# Patient Record
Sex: FEMALE | Race: BLACK OR AFRICAN AMERICAN | NOT HISPANIC OR LATINO | Employment: FULL TIME | RURAL
[De-identification: names, ages, dates, MRNs, and addresses within clinical notes are randomized per-mention and may not be internally consistent; named-entity substitution may affect disease eponyms.]

---

## 2019-09-30 ENCOUNTER — HISTORICAL (OUTPATIENT)
Dept: ADMINISTRATIVE | Facility: HOSPITAL | Age: 54
End: 2019-09-30

## 2019-10-02 LAB
LAB AP CLINICAL INFORMATION: NORMAL
LAB AP COMMENTS: NORMAL
LAB AP GENERAL CAT - HISTORICAL: NORMAL
LAB AP INTERPRETATION/RESULT - HISTORICAL: NEGATIVE
LAB AP SPECIMEN ADEQUACY - HISTORICAL: NORMAL
LAB AP SPECIMEN SUBMITTED - HISTORICAL: NORMAL

## 2021-09-13 ENCOUNTER — OFFICE VISIT (OUTPATIENT)
Dept: OBSTETRICS AND GYNECOLOGY | Facility: CLINIC | Age: 56
End: 2021-09-13
Payer: COMMERCIAL

## 2021-09-13 VITALS
RESPIRATION RATE: 18 BRPM | HEART RATE: 73 BPM | TEMPERATURE: 98 F | DIASTOLIC BLOOD PRESSURE: 82 MMHG | BODY MASS INDEX: 34.72 KG/M2 | OXYGEN SATURATION: 97 % | HEIGHT: 66 IN | SYSTOLIC BLOOD PRESSURE: 131 MMHG | WEIGHT: 216 LBS

## 2021-09-13 DIAGNOSIS — Z12.72 ENCOUNTER FOR SCREENING FOR MALIGNANT NEOPLASM OF VAGINA: ICD-10-CM

## 2021-09-13 DIAGNOSIS — R19.8 CHANGE IN BOWEL MOVEMENT: ICD-10-CM

## 2021-09-13 DIAGNOSIS — Z12.4 ENCOUNTER FOR SCREENING FOR MALIGNANT NEOPLASM OF CERVIX: ICD-10-CM

## 2021-09-13 DIAGNOSIS — N76.0 ACUTE VAGINITIS: Primary | ICD-10-CM

## 2021-09-13 DIAGNOSIS — Z01.419 ENCOUNTER FOR GYNECOLOGICAL EXAMINATION WITHOUT ABNORMAL FINDING: ICD-10-CM

## 2021-09-13 LAB
CANDIDA SPECIES: NEGATIVE
GARDNERELLA: POSITIVE
TRICHOMONAS: NEGATIVE

## 2021-09-13 PROCEDURE — 3075F SYST BP GE 130 - 139MM HG: CPT | Mod: ,,, | Performed by: ADVANCED PRACTICE MIDWIFE

## 2021-09-13 PROCEDURE — 1159F PR MEDICATION LIST DOCUMENTED IN MEDICAL RECORD: ICD-10-PCS | Mod: ,,, | Performed by: ADVANCED PRACTICE MIDWIFE

## 2021-09-13 PROCEDURE — 87624 HUMAN PAPILLOMAVIRUS (HPV): ICD-10-PCS | Mod: ,,, | Performed by: CLINICAL MEDICAL LABORATORY

## 2021-09-13 PROCEDURE — 87510 BACTERIAL VAGINOSIS: ICD-10-PCS | Mod: ,,, | Performed by: CLINICAL MEDICAL LABORATORY

## 2021-09-13 PROCEDURE — 88142 CYTOPATH C/V THIN LAYER: CPT | Mod: GCY | Performed by: ADVANCED PRACTICE MIDWIFE

## 2021-09-13 PROCEDURE — 3008F PR BODY MASS INDEX (BMI) DOCUMENTED: ICD-10-PCS | Mod: ,,, | Performed by: ADVANCED PRACTICE MIDWIFE

## 2021-09-13 PROCEDURE — 87624 HPV HI-RISK TYP POOLED RSLT: CPT | Mod: ,,, | Performed by: CLINICAL MEDICAL LABORATORY

## 2021-09-13 PROCEDURE — 3008F BODY MASS INDEX DOCD: CPT | Mod: ,,, | Performed by: ADVANCED PRACTICE MIDWIFE

## 2021-09-13 PROCEDURE — 3079F PR MOST RECENT DIASTOLIC BLOOD PRESSURE 80-89 MM HG: ICD-10-PCS | Mod: ,,, | Performed by: ADVANCED PRACTICE MIDWIFE

## 2021-09-13 PROCEDURE — 87660 TRICHOMONAS VAGIN DIR PROBE: CPT | Mod: ,,, | Performed by: CLINICAL MEDICAL LABORATORY

## 2021-09-13 PROCEDURE — 87660 BACTERIAL VAGINOSIS: ICD-10-PCS | Mod: ,,, | Performed by: CLINICAL MEDICAL LABORATORY

## 2021-09-13 PROCEDURE — 3079F DIAST BP 80-89 MM HG: CPT | Mod: ,,, | Performed by: ADVANCED PRACTICE MIDWIFE

## 2021-09-13 PROCEDURE — 87510 GARDNER VAG DNA DIR PROBE: CPT | Mod: ,,, | Performed by: CLINICAL MEDICAL LABORATORY

## 2021-09-13 PROCEDURE — 1159F MED LIST DOCD IN RCRD: CPT | Mod: ,,, | Performed by: ADVANCED PRACTICE MIDWIFE

## 2021-09-13 PROCEDURE — 99396 PR PREVENTIVE VISIT,EST,40-64: ICD-10-PCS | Mod: ,,, | Performed by: ADVANCED PRACTICE MIDWIFE

## 2021-09-13 PROCEDURE — 3075F PR MOST RECENT SYSTOLIC BLOOD PRESS GE 130-139MM HG: ICD-10-PCS | Mod: ,,, | Performed by: ADVANCED PRACTICE MIDWIFE

## 2021-09-13 PROCEDURE — 87480 BACTERIAL VAGINOSIS: ICD-10-PCS | Mod: ,,, | Performed by: CLINICAL MEDICAL LABORATORY

## 2021-09-13 PROCEDURE — 99396 PREV VISIT EST AGE 40-64: CPT | Mod: ,,, | Performed by: ADVANCED PRACTICE MIDWIFE

## 2021-09-13 PROCEDURE — 87480 CANDIDA DNA DIR PROBE: CPT | Mod: ,,, | Performed by: CLINICAL MEDICAL LABORATORY

## 2021-09-13 RX ORDER — CHOLECALCIFEROL (VITAMIN D3) 125 MCG
5000 CAPSULE ORAL DAILY
COMMUNITY

## 2021-09-15 LAB
GH SERPL-MCNC: NORMAL NG/ML
INSULIN SERPL-ACNC: NORMAL U[IU]/ML
LAB AP CLINICAL INFORMATION: NORMAL
LAB AP GYN INTERPRETATION: NEGATIVE
LAB AP PAP DISCLAIMER COMMENTS: NORMAL
RENIN PLAS-CCNC: NORMAL NG/ML/H

## 2021-09-19 RX ORDER — METRONIDAZOLE 500 MG/1
500 TABLET ORAL 2 TIMES DAILY
Qty: 14 TABLET | Refills: 0 | Status: SHIPPED | OUTPATIENT
Start: 2021-09-19 | End: 2021-09-26

## 2021-09-25 LAB
HPV 16: NEGATIVE
HPV 18: NEGATIVE
HPV OTHER: NEGATIVE

## 2021-10-21 ENCOUNTER — OFFICE VISIT (OUTPATIENT)
Dept: PRIMARY CARE CLINIC | Facility: CLINIC | Age: 56
End: 2021-10-21
Payer: COMMERCIAL

## 2021-10-21 VITALS
OXYGEN SATURATION: 100 % | HEIGHT: 66 IN | RESPIRATION RATE: 16 BRPM | SYSTOLIC BLOOD PRESSURE: 160 MMHG | DIASTOLIC BLOOD PRESSURE: 108 MMHG | TEMPERATURE: 99 F | BODY MASS INDEX: 34.39 KG/M2 | WEIGHT: 214 LBS | HEART RATE: 72 BPM

## 2021-10-21 DIAGNOSIS — R03.0 ELEVATED BLOOD PRESSURE READING WITHOUT DIAGNOSIS OF HYPERTENSION: ICD-10-CM

## 2021-10-21 DIAGNOSIS — R23.2 HOT FLASHES: ICD-10-CM

## 2021-10-21 DIAGNOSIS — K21.9 GASTROESOPHAGEAL REFLUX DISEASE WITHOUT ESOPHAGITIS: Primary | ICD-10-CM

## 2021-10-21 PROCEDURE — 1159F MED LIST DOCD IN RCRD: CPT | Mod: ,,, | Performed by: NURSE PRACTITIONER

## 2021-10-21 PROCEDURE — 3008F BODY MASS INDEX DOCD: CPT | Mod: ,,, | Performed by: NURSE PRACTITIONER

## 2021-10-21 PROCEDURE — 3008F PR BODY MASS INDEX (BMI) DOCUMENTED: ICD-10-PCS | Mod: ,,, | Performed by: NURSE PRACTITIONER

## 2021-10-21 PROCEDURE — 99213 PR OFFICE/OUTPT VISIT, EST, LEVL III, 20-29 MIN: ICD-10-PCS | Mod: ,,, | Performed by: NURSE PRACTITIONER

## 2021-10-21 PROCEDURE — 3080F DIAST BP >= 90 MM HG: CPT | Mod: ,,, | Performed by: NURSE PRACTITIONER

## 2021-10-21 PROCEDURE — 1159F PR MEDICATION LIST DOCUMENTED IN MEDICAL RECORD: ICD-10-PCS | Mod: ,,, | Performed by: NURSE PRACTITIONER

## 2021-10-21 PROCEDURE — 99213 OFFICE O/P EST LOW 20 MIN: CPT | Mod: ,,, | Performed by: NURSE PRACTITIONER

## 2021-10-21 PROCEDURE — 3077F PR MOST RECENT SYSTOLIC BLOOD PRESSURE >= 140 MM HG: ICD-10-PCS | Mod: ,,, | Performed by: NURSE PRACTITIONER

## 2021-10-21 PROCEDURE — 3077F SYST BP >= 140 MM HG: CPT | Mod: ,,, | Performed by: NURSE PRACTITIONER

## 2021-10-21 PROCEDURE — 3080F PR MOST RECENT DIASTOLIC BLOOD PRESSURE >= 90 MM HG: ICD-10-PCS | Mod: ,,, | Performed by: NURSE PRACTITIONER

## 2021-10-21 RX ORDER — OMEPRAZOLE 20 MG/1
20 CAPSULE, DELAYED RELEASE ORAL DAILY
Qty: 30 CAPSULE | Refills: 11 | Status: SHIPPED | OUTPATIENT
Start: 2021-10-21 | End: 2023-02-27

## 2021-10-21 RX ORDER — CALC/MAG/B COMPLEX/D3/HERB 61
15 TABLET ORAL DAILY
COMMUNITY
End: 2021-10-21

## 2021-11-01 PROBLEM — R03.0 ELEVATED BLOOD PRESSURE READING WITHOUT DIAGNOSIS OF HYPERTENSION: Status: ACTIVE | Noted: 2021-11-01

## 2021-11-01 PROBLEM — R23.2 HOT FLASHES: Status: ACTIVE | Noted: 2021-11-01

## 2021-11-01 PROBLEM — K21.9 GASTROESOPHAGEAL REFLUX DISEASE WITHOUT ESOPHAGITIS: Status: ACTIVE | Noted: 2021-11-01

## 2021-11-24 ENCOUNTER — CLINICAL SUPPORT (OUTPATIENT)
Dept: PRIMARY CARE CLINIC | Facility: CLINIC | Age: 56
End: 2021-11-24
Payer: COMMERCIAL

## 2021-11-24 VITALS — DIASTOLIC BLOOD PRESSURE: 110 MMHG | SYSTOLIC BLOOD PRESSURE: 146 MMHG

## 2021-11-24 RX ORDER — HYDROCHLOROTHIAZIDE 12.5 MG/1
12.5 TABLET ORAL DAILY
Qty: 30 TABLET | Refills: 11 | Status: SHIPPED | OUTPATIENT
Start: 2021-11-24 | End: 2021-12-23 | Stop reason: SDUPTHER

## 2021-12-23 ENCOUNTER — CLINICAL SUPPORT (OUTPATIENT)
Dept: PRIMARY CARE CLINIC | Facility: CLINIC | Age: 56
End: 2021-12-23
Payer: COMMERCIAL

## 2021-12-23 VITALS — DIASTOLIC BLOOD PRESSURE: 84 MMHG | SYSTOLIC BLOOD PRESSURE: 138 MMHG

## 2021-12-23 DIAGNOSIS — I10 ESSENTIAL HYPERTENSION: Primary | ICD-10-CM

## 2021-12-23 RX ORDER — HYDROCHLOROTHIAZIDE 12.5 MG/1
12.5 TABLET ORAL DAILY
Qty: 90 TABLET | Refills: 3 | Status: SHIPPED | OUTPATIENT
Start: 2021-12-23 | End: 2022-01-16 | Stop reason: SDUPTHER

## 2022-01-18 RX ORDER — HYDROCHLOROTHIAZIDE 12.5 MG/1
12.5 TABLET ORAL DAILY
Qty: 90 TABLET | Refills: 0 | Status: SHIPPED | OUTPATIENT
Start: 2022-01-18 | End: 2022-02-01 | Stop reason: SDUPTHER

## 2022-02-01 ENCOUNTER — PATIENT MESSAGE (OUTPATIENT)
Dept: PRIMARY CARE CLINIC | Facility: CLINIC | Age: 57
End: 2022-02-01
Payer: COMMERCIAL

## 2022-02-01 ENCOUNTER — OFFICE VISIT (OUTPATIENT)
Dept: PRIMARY CARE CLINIC | Facility: CLINIC | Age: 57
End: 2022-02-01
Payer: COMMERCIAL

## 2022-02-01 VITALS
OXYGEN SATURATION: 96 % | HEART RATE: 75 BPM | RESPIRATION RATE: 16 BRPM | WEIGHT: 214 LBS | HEIGHT: 66 IN | DIASTOLIC BLOOD PRESSURE: 70 MMHG | TEMPERATURE: 98 F | BODY MASS INDEX: 34.39 KG/M2 | SYSTOLIC BLOOD PRESSURE: 124 MMHG

## 2022-02-01 DIAGNOSIS — Z13.220 SCREENING FOR LIPOID DISORDERS: ICD-10-CM

## 2022-02-01 DIAGNOSIS — Z12.11 SCREENING FOR MALIGNANT NEOPLASM OF COLON: ICD-10-CM

## 2022-02-01 DIAGNOSIS — I10 ESSENTIAL HYPERTENSION: ICD-10-CM

## 2022-02-01 DIAGNOSIS — Z00.00 ENCOUNTER FOR GENERAL ADULT MEDICAL EXAMINATION WITHOUT ABNORMAL FINDINGS: Primary | ICD-10-CM

## 2022-02-01 DIAGNOSIS — Z13.1 SCREENING FOR DIABETES MELLITUS: ICD-10-CM

## 2022-02-01 PROCEDURE — 3074F SYST BP LT 130 MM HG: CPT | Mod: ,,, | Performed by: NURSE PRACTITIONER

## 2022-02-01 PROCEDURE — 1159F MED LIST DOCD IN RCRD: CPT | Mod: ,,, | Performed by: NURSE PRACTITIONER

## 2022-02-01 PROCEDURE — 3008F BODY MASS INDEX DOCD: CPT | Mod: ,,, | Performed by: NURSE PRACTITIONER

## 2022-02-01 PROCEDURE — 3078F PR MOST RECENT DIASTOLIC BLOOD PRESSURE < 80 MM HG: ICD-10-PCS | Mod: ,,, | Performed by: NURSE PRACTITIONER

## 2022-02-01 PROCEDURE — 1159F PR MEDICATION LIST DOCUMENTED IN MEDICAL RECORD: ICD-10-PCS | Mod: ,,, | Performed by: NURSE PRACTITIONER

## 2022-02-01 PROCEDURE — 3078F DIAST BP <80 MM HG: CPT | Mod: ,,, | Performed by: NURSE PRACTITIONER

## 2022-02-01 PROCEDURE — 3008F PR BODY MASS INDEX (BMI) DOCUMENTED: ICD-10-PCS | Mod: ,,, | Performed by: NURSE PRACTITIONER

## 2022-02-01 PROCEDURE — 99396 PREV VISIT EST AGE 40-64: CPT | Mod: ,,, | Performed by: NURSE PRACTITIONER

## 2022-02-01 PROCEDURE — 3074F PR MOST RECENT SYSTOLIC BLOOD PRESSURE < 130 MM HG: ICD-10-PCS | Mod: ,,, | Performed by: NURSE PRACTITIONER

## 2022-02-01 PROCEDURE — 99396 PR PREVENTIVE VISIT,EST,40-64: ICD-10-PCS | Mod: ,,, | Performed by: NURSE PRACTITIONER

## 2022-02-01 RX ORDER — HYDROCHLOROTHIAZIDE 12.5 MG/1
12.5 TABLET ORAL DAILY
Qty: 90 TABLET | Refills: 3 | Status: CANCELLED | OUTPATIENT
Start: 2022-02-01

## 2022-02-01 RX ORDER — HYDROCHLOROTHIAZIDE 12.5 MG/1
12.5 TABLET ORAL DAILY
Qty: 90 TABLET | Refills: 3 | Status: SHIPPED | OUTPATIENT
Start: 2022-02-01 | End: 2023-02-06 | Stop reason: SDUPTHER

## 2022-02-03 PROBLEM — Z00.00 ENCOUNTER FOR GENERAL ADULT MEDICAL EXAMINATION WITHOUT ABNORMAL FINDINGS: Status: ACTIVE | Noted: 2022-02-03

## 2022-02-03 NOTE — PROGRESS NOTES
Subjective:       Patient ID: Cary Carbone is a 56 y.o. female.    Chief Complaint: Follow-up (Healthy you)    HPI Cary Carbone presents today for Healthy You. No complaints or problems at present.  Mammogram, pap UTD  Colonoscopy not UTD. Patient agreeable to Cologuard    Review of Systems   Constitutional: Negative for fatigue, fever and unexpected weight change.   HENT: Negative for nasal congestion, postnasal drip and sore throat.    Eyes: Negative for pain and redness.   Respiratory: Negative for cough, shortness of breath and wheezing.    Cardiovascular: Negative for chest pain and leg swelling.   Gastrointestinal: Negative for abdominal pain, constipation, diarrhea and nausea.   Genitourinary: Negative for dysuria and hematuria.   Musculoskeletal: Negative for gait problem.   Integumentary:  Negative for color change and rash.   Neurological: Negative for vertigo, syncope and headaches.         Objective:      Physical Exam  Constitutional:       Appearance: Normal appearance.   HENT:      Head: Normocephalic.   Eyes:      Pupils: Pupils are equal, round, and reactive to light.   Cardiovascular:      Rate and Rhythm: Normal rate and regular rhythm.      Pulses: Normal pulses.      Heart sounds: Normal heart sounds.   Pulmonary:      Effort: Pulmonary effort is normal. No respiratory distress.      Breath sounds: Normal breath sounds. No wheezing, rhonchi or rales.   Abdominal:      General: Bowel sounds are normal.      Palpations: Abdomen is soft.      Tenderness: There is no abdominal tenderness.   Musculoskeletal:         General: Normal range of motion.      Cervical back: Normal range of motion and neck supple. No tenderness.   Skin:     General: Skin is warm and dry.   Neurological:      General: No focal deficit present.      Mental Status: She is alert.      Cranial Nerves: No cranial nerve deficit.      Gait: Gait normal.         Assessment:       Problem List Items Addressed This Visit         Cardiac/Vascular    Essential hypertension    Relevant Medications    hydroCHLOROthiazide (HYDRODIURIL) 12.5 MG Tab       Other    Encounter for general adult medical examination without abnormal findings - Primary     Healthy You labs  Cologuard  Continue all medications  Decrease fried/fatty foods in diet, increase protein and dietary fiber intake  Moderate exercise 3-5 x weekly           Other Visit Diagnoses     Screening for diabetes mellitus        Relevant Orders    Glucose, Fasting (Completed)    Screening for lipoid disorders        Relevant Orders    Lipid Panel (Completed)    Screening for malignant neoplasm of colon        Relevant Orders    Cologuard Screening (Multitarget Stool DNA)          Plan:       Encounter for general adult medical examination without abnormal findings  Healthy You labs  Cologuard  Continue all medications  Decrease fried/fatty foods in diet, increase protein and dietary fiber intake  Moderate exercise 3-5 x weekly

## 2022-02-03 NOTE — ASSESSMENT & PLAN NOTE
Healthy You labs  Cologuard  Continue all medications  Decrease fried/fatty foods in diet, increase protein and dietary fiber intake  Moderate exercise 3-5 x weekly

## 2022-02-16 LAB — NONINV COLON CA DNA+OCC BLD SCRN STL QL: NEGATIVE

## 2022-02-22 ENCOUNTER — PATIENT MESSAGE (OUTPATIENT)
Dept: PRIMARY CARE CLINIC | Facility: CLINIC | Age: 57
End: 2022-02-22
Payer: COMMERCIAL

## 2022-05-09 PROBLEM — Z00.00 ENCOUNTER FOR GENERAL ADULT MEDICAL EXAMINATION WITHOUT ABNORMAL FINDINGS: Status: RESOLVED | Noted: 2022-02-03 | Resolved: 2022-05-09

## 2022-06-23 ENCOUNTER — PATIENT MESSAGE (OUTPATIENT)
Dept: PRIMARY CARE CLINIC | Facility: CLINIC | Age: 57
End: 2022-06-23
Payer: COMMERCIAL

## 2022-06-23 ENCOUNTER — OFFICE VISIT (OUTPATIENT)
Dept: PRIMARY CARE CLINIC | Facility: CLINIC | Age: 57
End: 2022-06-23
Payer: COMMERCIAL

## 2022-06-23 ENCOUNTER — HOSPITAL ENCOUNTER (OUTPATIENT)
Dept: RADIOLOGY | Facility: HOSPITAL | Age: 57
Discharge: HOME | End: 2022-06-23
Attending: NURSE PRACTITIONER
Payer: COMMERCIAL

## 2022-06-23 VITALS
WEIGHT: 210 LBS | HEIGHT: 66 IN | OXYGEN SATURATION: 95 % | BODY MASS INDEX: 33.75 KG/M2 | SYSTOLIC BLOOD PRESSURE: 126 MMHG | DIASTOLIC BLOOD PRESSURE: 80 MMHG | HEART RATE: 78 BPM | RESPIRATION RATE: 18 BRPM | TEMPERATURE: 98 F

## 2022-06-23 DIAGNOSIS — I10 ESSENTIAL HYPERTENSION: Primary | ICD-10-CM

## 2022-06-23 DIAGNOSIS — M25.561 PAIN AND SWELLING OF RIGHT KNEE: ICD-10-CM

## 2022-06-23 DIAGNOSIS — M25.461 PAIN AND SWELLING OF RIGHT KNEE: ICD-10-CM

## 2022-06-23 DIAGNOSIS — E55.9 VITAMIN D DEFICIENCY: ICD-10-CM

## 2022-06-23 PROCEDURE — 73560 XR KNEE 1 OR 2 VIEW RIGHT: ICD-10-PCS | Mod: 26,RT,, | Performed by: RADIOLOGY

## 2022-06-23 PROCEDURE — 99213 PR OFFICE/OUTPT VISIT, EST, LEVL III, 20-29 MIN: ICD-10-PCS | Mod: ,,, | Performed by: NURSE PRACTITIONER

## 2022-06-23 PROCEDURE — 3008F BODY MASS INDEX DOCD: CPT | Mod: ,,, | Performed by: NURSE PRACTITIONER

## 2022-06-23 PROCEDURE — 3079F DIAST BP 80-89 MM HG: CPT | Mod: ,,, | Performed by: NURSE PRACTITIONER

## 2022-06-23 PROCEDURE — 73560 X-RAY EXAM OF KNEE 1 OR 2: CPT | Mod: TC,RT

## 2022-06-23 PROCEDURE — 1159F MED LIST DOCD IN RCRD: CPT | Mod: ,,, | Performed by: NURSE PRACTITIONER

## 2022-06-23 PROCEDURE — 3008F PR BODY MASS INDEX (BMI) DOCUMENTED: ICD-10-PCS | Mod: ,,, | Performed by: NURSE PRACTITIONER

## 2022-06-23 PROCEDURE — 3074F SYST BP LT 130 MM HG: CPT | Mod: ,,, | Performed by: NURSE PRACTITIONER

## 2022-06-23 PROCEDURE — 3079F PR MOST RECENT DIASTOLIC BLOOD PRESSURE 80-89 MM HG: ICD-10-PCS | Mod: ,,, | Performed by: NURSE PRACTITIONER

## 2022-06-23 PROCEDURE — 3074F PR MOST RECENT SYSTOLIC BLOOD PRESSURE < 130 MM HG: ICD-10-PCS | Mod: ,,, | Performed by: NURSE PRACTITIONER

## 2022-06-23 PROCEDURE — 1159F PR MEDICATION LIST DOCUMENTED IN MEDICAL RECORD: ICD-10-PCS | Mod: ,,, | Performed by: NURSE PRACTITIONER

## 2022-06-23 PROCEDURE — 73560 X-RAY EXAM OF KNEE 1 OR 2: CPT | Mod: 26,RT,, | Performed by: RADIOLOGY

## 2022-06-23 PROCEDURE — 99213 OFFICE O/P EST LOW 20 MIN: CPT | Mod: ,,, | Performed by: NURSE PRACTITIONER

## 2022-06-23 RX ORDER — LATANOPROST 50 UG/ML
SOLUTION/ DROPS OPHTHALMIC
COMMUNITY
Start: 2022-06-15

## 2022-06-23 NOTE — PROGRESS NOTES
Subjective:       Patient ID: Cary Carbone is a 56 y.o. female.    Chief Complaint: 6 month check up and Intermittent right knee pain (X2 weeks. Taking Tylenol and using ice pack/)    HPI Cary Carbone presents today for routine follow up.  Patient reports right knee pain and swelling x 2 weeks. Denies injury. Reports traveling to [place] for vacation where she did a lot of walking and had long car rides to and from destination. Denies instability of knee. Has applied ice and taken Tylenol with some relief of pain.    Review of Systems   Constitutional: Negative for activity change, fatigue, fever and unexpected weight change.   HENT: Negative for nasal congestion, hearing loss, postnasal drip, rhinorrhea, sore throat and trouble swallowing.    Eyes: Negative for pain, discharge, redness and visual disturbance.   Respiratory: Negative for cough, chest tightness, shortness of breath and wheezing.    Cardiovascular: Negative for chest pain, palpitations and leg swelling.   Gastrointestinal: Negative for abdominal pain, blood in stool, constipation, diarrhea, nausea and vomiting.   Endocrine: Negative for polydipsia and polyuria.   Genitourinary: Negative for difficulty urinating, dysuria, hematuria and menstrual problem.   Musculoskeletal: Positive for arthralgias. Negative for gait problem, joint swelling and neck pain.   Integumentary:  Negative for color change and rash.   Neurological: Negative for vertigo, syncope, weakness and headaches.   Psychiatric/Behavioral: Negative for confusion and dysphoric mood.         Objective:      Physical Exam  Constitutional:       Appearance: Normal appearance.   HENT:      Head: Normocephalic.   Eyes:      Pupils: Pupils are equal, round, and reactive to light.   Cardiovascular:      Rate and Rhythm: Normal rate and regular rhythm.      Pulses: Normal pulses.      Heart sounds: Normal heart sounds.   Pulmonary:      Effort: Pulmonary effort is normal. No respiratory distress.       Breath sounds: Normal breath sounds. No wheezing, rhonchi or rales.   Abdominal:      General: Bowel sounds are normal.      Palpations: Abdomen is soft.      Tenderness: There is no abdominal tenderness.   Musculoskeletal:      Cervical back: Normal range of motion and neck supple. No tenderness.      Right knee: Swelling present. Decreased range of motion. Tenderness present.   Skin:     General: Skin is warm and dry.   Neurological:      General: No focal deficit present.      Mental Status: She is alert.      Cranial Nerves: No cranial nerve deficit.      Gait: Gait normal.         Assessment:       Problem List Items Addressed This Visit        Cardiac/Vascular    Essential hypertension - Primary    Relevant Orders    CBC Auto Differential (Completed)    Comprehensive Metabolic Panel (Completed)      Other Visit Diagnoses     Pain and swelling of right knee        Relevant Orders    X-Ray Knee 1 or 2 View Right (Completed)    Vitamin D deficiency        Relevant Orders    Vitamin D          Plan:       Labs as ordered  X-ray right knee

## 2023-02-06 DIAGNOSIS — I10 ESSENTIAL HYPERTENSION: ICD-10-CM

## 2023-02-06 RX ORDER — HYDROCHLOROTHIAZIDE 12.5 MG/1
12.5 TABLET ORAL DAILY
Qty: 90 TABLET | Refills: 0 | Status: SHIPPED | OUTPATIENT
Start: 2023-02-06 | End: 2023-02-27 | Stop reason: SDUPTHER

## 2023-02-27 ENCOUNTER — OFFICE VISIT (OUTPATIENT)
Dept: PRIMARY CARE CLINIC | Facility: CLINIC | Age: 58
End: 2023-02-27
Payer: COMMERCIAL

## 2023-02-27 ENCOUNTER — HOSPITAL ENCOUNTER (OUTPATIENT)
Dept: RADIOLOGY | Facility: HOSPITAL | Age: 58
Discharge: HOME | End: 2023-02-27
Attending: NURSE PRACTITIONER
Payer: COMMERCIAL

## 2023-02-27 VITALS
HEIGHT: 66 IN | TEMPERATURE: 98 F | OXYGEN SATURATION: 98 % | DIASTOLIC BLOOD PRESSURE: 84 MMHG | SYSTOLIC BLOOD PRESSURE: 132 MMHG | WEIGHT: 212 LBS | HEART RATE: 70 BPM | BODY MASS INDEX: 34.07 KG/M2

## 2023-02-27 VITALS — WEIGHT: 212 LBS | BODY MASS INDEX: 34.22 KG/M2

## 2023-02-27 DIAGNOSIS — Z11.4 SCREENING FOR HIV (HUMAN IMMUNODEFICIENCY VIRUS): ICD-10-CM

## 2023-02-27 DIAGNOSIS — Z12.39 ENCOUNTER FOR SCREENING FOR MALIGNANT NEOPLASM OF BREAST, UNSPECIFIED SCREENING MODALITY: ICD-10-CM

## 2023-02-27 DIAGNOSIS — I10 ESSENTIAL HYPERTENSION: ICD-10-CM

## 2023-02-27 DIAGNOSIS — Z00.00 ROUTINE GENERAL MEDICAL EXAMINATION AT A HEALTH CARE FACILITY: Primary | ICD-10-CM

## 2023-02-27 DIAGNOSIS — Z11.59 NEED FOR HEPATITIS C SCREENING TEST: ICD-10-CM

## 2023-02-27 DIAGNOSIS — Z13.220 SCREENING FOR LIPOID DISORDERS: ICD-10-CM

## 2023-02-27 DIAGNOSIS — Z13.1 SCREENING FOR DIABETES MELLITUS: ICD-10-CM

## 2023-02-27 PROBLEM — G47.33 OBSTRUCTIVE SLEEP APNEA SYNDROME: Status: ACTIVE | Noted: 2023-02-27

## 2023-02-27 PROBLEM — G47.30 SLEEP APNEA: Status: ACTIVE | Noted: 2023-02-27

## 2023-02-27 PROCEDURE — 99396 PREV VISIT EST AGE 40-64: CPT | Mod: ,,, | Performed by: NURSE PRACTITIONER

## 2023-02-27 PROCEDURE — 3008F BODY MASS INDEX DOCD: CPT | Mod: ,,, | Performed by: NURSE PRACTITIONER

## 2023-02-27 PROCEDURE — 77067 SCR MAMMO BI INCL CAD: CPT | Mod: TC

## 2023-02-27 PROCEDURE — 3008F PR BODY MASS INDEX (BMI) DOCUMENTED: ICD-10-PCS | Mod: ,,, | Performed by: NURSE PRACTITIONER

## 2023-02-27 PROCEDURE — 1159F PR MEDICATION LIST DOCUMENTED IN MEDICAL RECORD: ICD-10-PCS | Mod: ,,, | Performed by: NURSE PRACTITIONER

## 2023-02-27 PROCEDURE — 3075F PR MOST RECENT SYSTOLIC BLOOD PRESS GE 130-139MM HG: ICD-10-PCS | Mod: ,,, | Performed by: NURSE PRACTITIONER

## 2023-02-27 PROCEDURE — 1160F PR REVIEW ALL MEDS BY PRESCRIBER/CLIN PHARMACIST DOCUMENTED: ICD-10-PCS | Mod: ,,, | Performed by: NURSE PRACTITIONER

## 2023-02-27 PROCEDURE — 3075F SYST BP GE 130 - 139MM HG: CPT | Mod: ,,, | Performed by: NURSE PRACTITIONER

## 2023-02-27 PROCEDURE — 1159F MED LIST DOCD IN RCRD: CPT | Mod: ,,, | Performed by: NURSE PRACTITIONER

## 2023-02-27 PROCEDURE — 99396 PR PREVENTIVE VISIT,EST,40-64: ICD-10-PCS | Mod: ,,, | Performed by: NURSE PRACTITIONER

## 2023-02-27 PROCEDURE — 1160F RVW MEDS BY RX/DR IN RCRD: CPT | Mod: ,,, | Performed by: NURSE PRACTITIONER

## 2023-02-27 PROCEDURE — 3079F DIAST BP 80-89 MM HG: CPT | Mod: ,,, | Performed by: NURSE PRACTITIONER

## 2023-02-27 PROCEDURE — 3079F PR MOST RECENT DIASTOLIC BLOOD PRESSURE 80-89 MM HG: ICD-10-PCS | Mod: ,,, | Performed by: NURSE PRACTITIONER

## 2023-02-27 RX ORDER — HYDROCHLOROTHIAZIDE 12.5 MG/1
12.5 TABLET ORAL DAILY
Qty: 90 TABLET | Refills: 3 | Status: SHIPPED | OUTPATIENT
Start: 2023-02-27 | End: 2024-02-09

## 2023-02-27 NOTE — PATIENT INSTRUCTIONS
"Patient Education       Diet and Health   The Basics   Written by the doctors and editors at [facility]   Why is it important to eat a healthy diet? -- It's important to eat a healthy diet because eating the right foods can keep you healthy now and later on in life.  Which foods are especially healthy? -- Foods that are especially healthy include:  Fruits and vegetables - Eating a diet with lots of fruits and vegetables can help prevent heart disease and strokes. It might also help prevent certain types of cancers. Try to eat fruits and vegetables at each meal and also for snacks. If you don't have fresh fruits and vegetables available, you can eat frozen or canned ones instead. Doctors recommend eating at least 2 1/2 servings of vegetables and 2 servings of fruits each day.  Foods with fiber - Eating foods with a lot of fiber can help prevent heart disease and strokes. If you have type 2 diabetes, it can also help control your blood sugar. Foods that have a lot of fiber include vegetables, fruits, beans, nuts, oatmeal, and whole grain breads and cereals. You can tell how much fiber is in a food by reading the nutrition label (figure 1). Doctors recommend eating 25 to 36 grams of fiber each day.  Foods with folate (also called folic acid) - Folate is a vitamin that is important for pregnant people, since it helps prevent certain birth defects. Anyone who could get pregnant should get at least 400 micrograms of folic acid daily, whether or not they are actively trying to get pregnant. Folate is found in many breakfast cereals, oranges, orange juice, and green leafy vegetables.  Foods with calcium and vitamin D - Babies, children, and adults need calcium and vitamin D to help keep their bones strong. Adults also need calcium and vitamin D to help prevent osteoporosis. Osteoporosis is a condition that causes bones to get "thin" and break more easily than usual. Different foods and drinks have calcium and vitamin D in " "them (figure 2). People who don't get enough calcium and vitamin D in their diet might need to take a supplement.  Foods with protein - Protein helps your muscles stay strong. Healthy foods with a lot of protein include chicken, fish, eggs, beans, nuts, and soy products. Red meat also has a lot of protein, but it also contains fats, which can be unhealthy.  Some experts recommend a "Mediterranean diet." This involves eating a lot of fruits, vegetables, nuts, whole grains, and olive oil. It also includes some fish, poultry, and dairy products, but not a lot of red meat. Eating this way can help your overall health, and might even lower your risk of having a stroke.  What foods should I avoid or limit? -- To eat a healthy diet, there are some things you should avoid or limit. They include:   Fats - There are different types of fats. Some types of fats are better for your body than others.  Trans fats are especially unhealthy. They are found in margarines, many fast foods, and some store-bought baked goods. Trans fats can raise your cholesterol level and your increase your chance of getting heart disease. You should avoid eating foods with these types of fats.  The type of "polyunsaturated" fats found in fish seems to be healthy and can reduce your chance of getting heart disease. Other polyunsaturated fats might also be good for your health. When you cook, it's best to use oils with healthier fats, such as olive oil and canola oil.  Sugar - To have a healthy diet, it's important to limit or avoid sugar, sweets, and refined grains. Refined grains are found in white bread, white rice, most forms of pasta, and most packaged "snack" foods. Whole grains, such as whole-wheat bread and brown rice, have more fiber and are better for your health.  Avoiding sugar-sweetened beverages, like soda and sports drinks, can also help improve your health.  Red meat - Studies have shown that eating a lot of red meat can increase your " "risk of certain health problems, including heart disease and cancer. You should limit the amount of red meat that you eat.  Can I drink alcohol as part of a healthy diet? -- People who drink a small amount of alcohol each day might have a lower chance of getting heart disease. But drinking alcohol can lead to problems. For example, it can raise a person's chances of getting liver disease and certain types of cancers. In women, even 1 drink a day can increase the risk of getting breast cancer.  Most doctors recommend that adult women not have more than 1 drink a day and that adult men not have more than 2 drinks a day. The limits are different because most women's bodies take longer to break down alcohol.  How many calories do I need each day? -- The number of calories you need each day depends on your weight, height, age, sex, and how active you are.  Your doctor or nurse can tell you how many calories you should eat each day. If you are trying to lose weight, you should eat fewer calories each day.  What if I have questions? -- If you have questions about which foods you should or should not eat, ask your doctor or nurse. The right diet for you will depend, in part, on your health and any medical conditions you have.  All topics are updated as new evidence becomes available and our peer review process is complete.  This topic retrieved from [facility] [employer] [facility] on: Sep 21, 2021.  Topic 84273 Version 20.0  Release: 29.4.2 - C29.263  © 2021 UpToDate, Inc. and/or its affiliates. All rights reserved.  figure 1: Nutrition label - fiber     This is an example of a nutrition label. To figure out how much fiber is in a food, look for the line that says "Dietary Fiber." It's also important to look at the serving size. This food has 7 grams of fiber in each serving, and each serving is 1 cup.  Graphic 53182 Version 7.0    figure 2: Foods and drinks with calcium and vitamin D     Foods rich in calcium include ice cream, soy milk, breads, " "kale, broccoli, milk, cheese, cottage cheese, almonds, yogurt, ready-to-eat cereals, beans, and tofu. Foods rich in vitamin D include milk, fortified plant-based "milks" (soy, almond), canned tuna fish, cod liver oil, yogurt, ready-to-eat-cereals, cooked salmon, canned sardines, mackerel, and eggs. Some of these foods are rich in both.  Graphic 96872 Version 4.0    Consumer Information Use and Disclaimer   This information is not specific medical advice and does not replace information you receive from your health care provider. This is only a brief summary of general information. It does NOT include all information about conditions, illnesses, injuries, tests, procedures, treatments, therapies, discharge instructions or life-style choices that may apply to you. You must talk with your health care provider for complete information about your health and treatment options. This information should not be used to decide whether or not to accept your health care provider's advice, instructions or recommendations. Only your health care provider has the knowledge and training to provide advice that is right for you. The use of this information is governed by the [employer] End User License Agreement, available at https://www.MODLOFT.Wis.dm/en/solutions/Wingz/about/amaury.The use of [employer] content is governed by the [employer] Terms of Use. ©2021 UpToDate, Inc. All rights reserved.  Copyright   © 2021 UpToDate, Inc. and/or its affiliates. All rights reserved.    "

## 2023-02-27 NOTE — PROGRESS NOTES
Subjective:       Patient ID: Cary Carbone is a 57 y.o. female.    Chief Complaint: Healthy You    Pt presents for a healthy you visit and to establish care.     Review of Systems   Constitutional:  Negative for activity change and unexpected weight change.   HENT:  Negative for hearing loss, rhinorrhea and trouble swallowing.    Eyes:  Negative for discharge and visual disturbance.   Respiratory:  Negative for chest tightness and wheezing.    Cardiovascular:  Negative for chest pain and palpitations.   Gastrointestinal:  Negative for blood in stool, constipation, diarrhea and vomiting.   Endocrine: Negative for polydipsia and polyuria.   Genitourinary:  Negative for difficulty urinating, dysuria, hematuria and menstrual problem.   Musculoskeletal:  Negative for arthralgias, joint swelling and neck pain.   Neurological:  Negative for weakness and headaches.   Psychiatric/Behavioral:  Negative for confusion and dysphoric mood.        Objective:      Physical Exam  Vitals and nursing note reviewed.   Constitutional:       Appearance: Normal appearance.   Cardiovascular:      Rate and Rhythm: Normal rate and regular rhythm.      Heart sounds: Normal heart sounds.   Pulmonary:      Effort: Pulmonary effort is normal.      Breath sounds: Normal breath sounds.   Musculoskeletal:         General: Normal range of motion.   Neurological:      Mental Status: She is alert and oriented to person, place, and time.   Psychiatric:         Mood and Affect: Mood normal.         Behavior: Behavior normal.       Assessment:       Problem List Items Addressed This Visit          Cardiac/Vascular    Essential hypertension    Relevant Medications    hydroCHLOROthiazide (HYDRODIURIL) 12.5 MG Tab    Other Relevant Orders    CBC Auto Differential    Comprehensive Metabolic Panel    TSH     Other Visit Diagnoses       Routine general medical examination at a health care facility    -  Primary    Screening for diabetes mellitus         Relevant Orders    Hemoglobin A1C    Screening for lipoid disorders        Relevant Orders    Lipid Panel    BMI 34.0-34.9,adult        Need for hepatitis C screening test        Relevant Orders    Hepatitis C Antibody    Screening for HIV (human immunodeficiency virus)        Relevant Orders    HIV 1/2 Ag/Ab (4th Gen)    Encounter for screening for malignant neoplasm of breast, unspecified screening modality        Relevant Orders    Mammo Digital Screening Bilat              Plan:       Will call pt with lab results. She will go for a walk in mammogram today.

## 2023-02-27 NOTE — LETTER
February 27, 2023      [facility]  [address] [facility] [address]  Phone: [phone]  Fax: [phone]       Patient: Cary Carbone   YOB: 1965  Date of Visit: 02/27/2023    To Whom It May Concern:    Dona Carbone  was at [facility] on 02/27/2023. The patient may return to work/school on 02/28/2023 with no restrictions. If you have any questions or concerns, or if I can be of further assistance, please do not hesitate to contact me.    Sincerely,    AMANDA Kim

## 2023-03-06 ENCOUNTER — OFFICE VISIT (OUTPATIENT)
Dept: OBSTETRICS AND GYNECOLOGY | Facility: CLINIC | Age: 58
End: 2023-03-06
Payer: COMMERCIAL

## 2023-03-06 VITALS
HEIGHT: 66 IN | WEIGHT: 208.19 LBS | HEART RATE: 87 BPM | OXYGEN SATURATION: 98 % | TEMPERATURE: 98 F | SYSTOLIC BLOOD PRESSURE: 148 MMHG | DIASTOLIC BLOOD PRESSURE: 82 MMHG | RESPIRATION RATE: 18 BRPM | BODY MASS INDEX: 33.46 KG/M2

## 2023-03-06 DIAGNOSIS — R23.2 HOT FLASHES: ICD-10-CM

## 2023-03-06 DIAGNOSIS — N76.0 BV (BACTERIAL VAGINOSIS): ICD-10-CM

## 2023-03-06 DIAGNOSIS — Z01.419 WELL WOMAN EXAM WITH ROUTINE GYNECOLOGICAL EXAM: Primary | ICD-10-CM

## 2023-03-06 DIAGNOSIS — L70.0 OPEN COMEDONE: ICD-10-CM

## 2023-03-06 DIAGNOSIS — Z12.4 ENCOUNTER FOR SCREENING FOR MALIGNANT NEOPLASM OF CERVIX: ICD-10-CM

## 2023-03-06 DIAGNOSIS — N95.1 MENOPAUSAL SYMPTOM: ICD-10-CM

## 2023-03-06 DIAGNOSIS — B96.89 BV (BACTERIAL VAGINOSIS): ICD-10-CM

## 2023-03-06 LAB
CANDIDA SPECIES: NEGATIVE
GARDNERELLA: POSITIVE
TRICHOMONAS: NEGATIVE

## 2023-03-06 PROCEDURE — 87660 TRICHOMONAS VAGIN DIR PROBE: CPT | Mod: ,,, | Performed by: CLINICAL MEDICAL LABORATORY

## 2023-03-06 PROCEDURE — 87660 BACTERIAL VAGINOSIS: ICD-10-PCS | Mod: ,,, | Performed by: CLINICAL MEDICAL LABORATORY

## 2023-03-06 PROCEDURE — 99396 PR PREVENTIVE VISIT,EST,40-64: ICD-10-PCS | Mod: ,,, | Performed by: ADVANCED PRACTICE MIDWIFE

## 2023-03-06 PROCEDURE — 87480 BACTERIAL VAGINOSIS: ICD-10-PCS | Mod: ,,, | Performed by: CLINICAL MEDICAL LABORATORY

## 2023-03-06 PROCEDURE — 87510 GARDNER VAG DNA DIR PROBE: CPT | Mod: ,,, | Performed by: CLINICAL MEDICAL LABORATORY

## 2023-03-06 PROCEDURE — 87510 BACTERIAL VAGINOSIS: ICD-10-PCS | Mod: ,,, | Performed by: CLINICAL MEDICAL LABORATORY

## 2023-03-06 PROCEDURE — 87624 HUMAN PAPILLOMAVIRUS (HPV): ICD-10-PCS | Mod: ,,, | Performed by: CLINICAL MEDICAL LABORATORY

## 2023-03-06 PROCEDURE — 88142 CYTOPATH C/V THIN LAYER: CPT | Mod: TC,GCY | Performed by: ADVANCED PRACTICE MIDWIFE

## 2023-03-06 PROCEDURE — 99396 PREV VISIT EST AGE 40-64: CPT | Mod: ,,, | Performed by: ADVANCED PRACTICE MIDWIFE

## 2023-03-06 PROCEDURE — 87624 HPV HI-RISK TYP POOLED RSLT: CPT | Mod: ,,, | Performed by: CLINICAL MEDICAL LABORATORY

## 2023-03-06 PROCEDURE — 87480 CANDIDA DNA DIR PROBE: CPT | Mod: ,,, | Performed by: CLINICAL MEDICAL LABORATORY

## 2023-03-06 RX ORDER — METRONIDAZOLE 500 MG/1
500 TABLET ORAL 2 TIMES DAILY
Qty: 14 TABLET | Refills: 3 | Status: SHIPPED | OUTPATIENT
Start: 2023-03-06 | End: 2023-08-28

## 2023-03-06 RX ORDER — PAROXETINE 10 MG/1
10 TABLET, FILM COATED ORAL EVERY MORNING
Qty: 30 TABLET | Refills: 11 | Status: SHIPPED | OUTPATIENT
Start: 2023-03-06 | End: 2024-03-05

## 2023-03-06 NOTE — PROGRESS NOTES
CC: Here for pap smear, annual exam    Cary Carbone is a 57 y.o. female  presents for well woman exam.  LMP: No LMP recorded (lmp unknown). Patient is postmenopausal.. Menses are: absent and pt is menopausal. States is exercising with  daily. She states she has been having some issues with her knee and continues to exercise- states is being managed by AMANDA Gaines.      C/O hot flashes daily. States takes is taking menopause pills/gummies in a purple bottle off TV- informed to not take medication and could possibly increase blood pressure. Denies any further issues, problems, or complaints.    Last mammogram: 2023  Colonoscopy: 2022 was a [facility] performed and recommended to be repeated in 3 years per CHUY Trammell    Past Medical History:   Diagnosis Date    Essential hypertension 2021     Past Surgical History:   Procedure Laterality Date     SECTION      COLONOSCOPY      over 10 years ago per patient @ [facility] 2021    [facility]  2022    Results were negative per AMANDA Duran- recommended repeat in 3 yrs    TUBAL LIGATION       Social History     Socioeconomic History    Marital status:    Tobacco Use    Smoking status: Never    Smokeless tobacco: Never   Substance and Sexual Activity    Alcohol use: Never    Drug use: Never    Sexual activity: Yes     Partners: Male     Social Determinants of Health     Physical Activity: Insufficiently Active    Days of Exercise per Week: 1 day    Minutes of Exercise per Session: 20 min   Stress: No Stress Concern Present    Feeling of Stress : Not at all     Family History   Problem Relation Age of Onset    Diabetes Mother     Breast cancer Maternal Aunt      OB History          2    Para   2    Term   2            AB        Living   1         SAB        IAB        Ectopic        Multiple        Live Births   1                 BP (!) 148/82   Pulse 87   Temp 98.4 °F (36.9 °C) (Oral)   Resp 18   " Ht 5' 6" (1.676 m)   Wt 94.4 kg (208 lb 3.2 oz)   LMP  (LMP Unknown)   SpO2 98%   BMI 33.60 kg/m²       ROS:  GENERAL: Denies weight gain or weight loss. Feeling well overall.   SKIN: Denies rash or lesions.   HEAD: Denies head injury or headache.   NODES: Denies enlarged lymph nodes.   CHEST: Denies chest pain or shortness of breath.   CARDIOVASCULAR: Denies palpitations or left sided chest pain.   ABDOMEN: No abdominal pain, constipation, diarrhea, nausea, vomiting or rectal bleeding.   URINARY: No frequency, dysuria, hematuria, or burning on urination.  REPRODUCTIVE: See HPI.   BREASTS: The patient performs breast self-examination and denies pain, lumps, or nipple discharge.   HEMATOLOGIC: No easy bruisability or excessive bleeding.   MUSCULOSKELETAL: Denies joint pain or swelling.   NEUROLOGIC: Denies syncope or weakness.   PSYCHIATRIC: Denies depression, anxiety or mood swings.    PHYSICAL EXAM:  APPEARANCE: Well nourished, well developed, in no acute distress.  AFFECT: WNL, alert and oriented x 3  SKIN: No acne or hirsutism  NECK: Neck symmetric without masses or thyromegaly  NODES: No inguinal, cervical, axillary, or femoral lymph node enlargement  CHEST: Good respiratory effect  ABDOMEN: Soft.  No tenderness or masses.  No hepatosplenomegaly.  No hernias.  BREASTS: Symmetrical, no skin changes or visible lesions.  No palpable masses, nipple discharge bilaterally. 1x1 cm firm comodone noted to right upper breast area without any tendernessredness, drainage, or edema  PELVIC: Normal external genitalia without lesions.  Normal hair distribution.  Adequate perineal body, normal urethral meatus.  Vagina moist and well rugated without lesions, with thick white discharge.  Cervix pink, without lesions, tenderness, stenotic with noted thick white discharge.  No significant cystocele or rectocele.  Bimanual exam shows uterus to be normal size, regular, mobile and nontender.  Adnexa without masses or tenderness. "    EXTREMITIES: No edema.    Well woman exam with routine gynecological exam  -     ThinPrep Pap Test; Future; Expected date: 03/06/2023    Encounter for screening for malignant neoplasm of cervix  -     ThinPrep Pap Test; Future; Expected date: 03/06/2023    Menopausal symptom  -     paroxetine (PAXIL) 10 MG tablet; Take 1 tablet (10 mg total) by mouth every morning.  Dispense: 30 tablet; Refill: 11    Hot flashes  -     paroxetine (PAXIL) 10 MG tablet; Take 1 tablet (10 mg total) by mouth every morning.  Dispense: 30 tablet; Refill: 11    BMI 33.0-33.9,adult    BV (bacterial vaginosis)  -     Bacterial Vaginosis; Future; Expected date: 03/06/2023    Open comedone  -     Ambulatory referral/consult to Dermatology; Future; Expected date: 03/13/2023        ICD-10-CM ICD-9-CM    1. Well woman exam with routine gynecological exam  Z01.419 V72.31 ThinPrep Pap Test      ThinPrep Pap Test      2. Encounter for screening for malignant neoplasm of cervix  Z12.4 V76.2 ThinPrep Pap Test      ThinPrep Pap Test      3. Menopausal symptom  N95.1 627.2 paroxetine (PAXIL) 10 MG tablet      4. Hot flashes  R23.2 782.62 paroxetine (PAXIL) 10 MG tablet      5. BMI 33.0-33.9,adult  Z68.33 V85.33       6. BV (bacterial vaginosis)  N76.0 616.10 Bacterial Vaginosis    B96.89 041.9 Bacterial Vaginosis      7. Open comedone  L70.0 706.1 Ambulatory referral/consult to Dermatology          Patient was counseled today on A.C.S. Pap guidelines and recommendations for yearly pelvic exams, mammograms and monthly self breast exams; to see her PCP for other health maintenance.   Exercise regimen encouraged  Healthy food choices encouraged  Multivitamins daily  Vitamin D daily  Weight loss encouraged  Low weight bearing exericses  Calcium and vitamin D to promote bone health  Avoid highly caffeinated sodas and energy drinks  Questions answered to desired level of satisfaction  Verbalized understanding to all information and instructions    Follow  up in about 1 year (around 3/6/2024), or if symptoms worsen or fail to improve, for Annual Exam in 1 year and 1 month to f/u hot flashes/menopausal syndrome.

## 2023-03-11 LAB
HPV 16: NEGATIVE
HPV 18: NEGATIVE
HPV OTHER: NEGATIVE

## 2023-03-21 ENCOUNTER — OFFICE VISIT (OUTPATIENT)
Dept: DERMATOLOGY | Facility: CLINIC | Age: 58
End: 2023-03-21
Payer: COMMERCIAL

## 2023-03-21 DIAGNOSIS — L72.0 EPIDERMAL CYST: ICD-10-CM

## 2023-03-21 PROCEDURE — 1159F MED LIST DOCD IN RCRD: CPT | Mod: ,,, | Performed by: STUDENT IN AN ORGANIZED HEALTH CARE EDUCATION/TRAINING PROGRAM

## 2023-03-21 PROCEDURE — 3044F PR MOST RECENT HEMOGLOBIN A1C LEVEL <7.0%: ICD-10-PCS | Mod: ,,, | Performed by: STUDENT IN AN ORGANIZED HEALTH CARE EDUCATION/TRAINING PROGRAM

## 2023-03-21 PROCEDURE — 3044F HG A1C LEVEL LT 7.0%: CPT | Mod: ,,, | Performed by: STUDENT IN AN ORGANIZED HEALTH CARE EDUCATION/TRAINING PROGRAM

## 2023-03-21 PROCEDURE — 99203 OFFICE O/P NEW LOW 30 MIN: CPT | Mod: ,,, | Performed by: STUDENT IN AN ORGANIZED HEALTH CARE EDUCATION/TRAINING PROGRAM

## 2023-03-21 PROCEDURE — 1159F PR MEDICATION LIST DOCUMENTED IN MEDICAL RECORD: ICD-10-PCS | Mod: ,,, | Performed by: STUDENT IN AN ORGANIZED HEALTH CARE EDUCATION/TRAINING PROGRAM

## 2023-03-21 PROCEDURE — 99203 PR OFFICE/OUTPT VISIT, NEW, LEVL III, 30-44 MIN: ICD-10-PCS | Mod: ,,, | Performed by: STUDENT IN AN ORGANIZED HEALTH CARE EDUCATION/TRAINING PROGRAM

## 2023-03-21 NOTE — PATIENT INSTRUCTIONS
Epidermal inclusion cyst on the right breast that is benign. Please call when you want to schedule an excision. Excision slots are scheduled Thursday mornings

## 2023-03-21 NOTE — PROGRESS NOTES
[facility]  Eliud Hilton MD    [address] [place] [address]  [phone]    Fax: [phone]    Patient Name: Cary Carbone  Medical Record Number: [id number]  PCP: AMANDA Kim  Age: 57 y.o. : 1965  Contact: [phone] (home) [phone] (work)    CC: skin lesion  History of Present Illness:     Cary Carbone is a 57 y.o.  female with no history of skin cancer  who presents to clinic today for skin lesion on the right breast.  This has been present for over three months. Symptoms include growth and drainage. Previous treatments include none.      The patient has no other concerns today.    Review of Systems:     Unremarkable other than mentioned above.     Physical Exam:     General: Relaxed, oriented, alert    Skin examination of the scalp, face, neck, chest, back, abdomen, upper extremities and lower extremities were normal except for as listed below        Assessment and Plan:     1. Epidermal Cyst  - subcutaneous cyst with prominent follicular pore located on the right breast     Plan:  - scheduled for excision. Discussed risks and benefits             Return to clinic as needed.     AVS printed with patient instructions     Eliud Hilton MD   Mohs Surgery/Dermatologic Oncology  Dermatology

## 2023-03-23 ENCOUNTER — PATIENT OUTREACH (OUTPATIENT)
Dept: ADMINISTRATIVE | Facility: HOSPITAL | Age: 58
End: 2023-03-23

## 2023-03-23 NOTE — PROGRESS NOTES
Post visit Population Health review of encounter with date of service 02/27/2023 with GIOVANNI Bauer.  HY biometrics present in BC website.  No HY scheduled.  [place] [facility] [place]

## 2023-04-06 ENCOUNTER — PROCEDURE VISIT (OUTPATIENT)
Dept: DERMATOLOGY | Facility: CLINIC | Age: 58
End: 2023-04-06
Payer: COMMERCIAL

## 2023-04-06 VITALS — SYSTOLIC BLOOD PRESSURE: 145 MMHG | DIASTOLIC BLOOD PRESSURE: 89 MMHG | HEART RATE: 68 BPM

## 2023-04-06 DIAGNOSIS — D48.9 NEOPLASM OF UNCERTAIN BEHAVIOR: Primary | ICD-10-CM

## 2023-04-06 PROCEDURE — 13101 PR RECMPL WND TRUNK 2.6-7.5 CM: ICD-10-PCS | Mod: 51,,, | Performed by: STUDENT IN AN ORGANIZED HEALTH CARE EDUCATION/TRAINING PROGRAM

## 2023-04-06 PROCEDURE — 88304 TISSUE EXAM BY PATHOLOGIST: CPT | Mod: 26,,, | Performed by: PATHOLOGY

## 2023-04-06 PROCEDURE — 99499 NO LOS: ICD-10-PCS | Mod: ,,, | Performed by: STUDENT IN AN ORGANIZED HEALTH CARE EDUCATION/TRAINING PROGRAM

## 2023-04-06 PROCEDURE — 11403 EXC TR-EXT B9+MARG 2.1-3CM: CPT | Mod: ,,, | Performed by: STUDENT IN AN ORGANIZED HEALTH CARE EDUCATION/TRAINING PROGRAM

## 2023-04-06 PROCEDURE — 13101 CMPLX RPR TRUNK 2.6-7.5 CM: CPT | Mod: 51,,, | Performed by: STUDENT IN AN ORGANIZED HEALTH CARE EDUCATION/TRAINING PROGRAM

## 2023-04-06 PROCEDURE — 88304 TISSUE EXAM BY PATHOLOGIST: CPT | Mod: TC,SUR | Performed by: STUDENT IN AN ORGANIZED HEALTH CARE EDUCATION/TRAINING PROGRAM

## 2023-04-06 PROCEDURE — 11403 PR EXC SKIN BENIG 2.1-3 CM TRUNK,ARM,LEG: ICD-10-PCS | Mod: ,,, | Performed by: STUDENT IN AN ORGANIZED HEALTH CARE EDUCATION/TRAINING PROGRAM

## 2023-04-06 PROCEDURE — 88304 PATHOLOGY, DERMATOLOGY: ICD-10-PCS | Mod: 26,,, | Performed by: PATHOLOGY

## 2023-04-06 PROCEDURE — 99499 UNLISTED E&M SERVICE: CPT | Mod: ,,, | Performed by: STUDENT IN AN ORGANIZED HEALTH CARE EDUCATION/TRAINING PROGRAM

## 2023-04-06 NOTE — PATIENT INSTRUCTIONS

## 2023-04-06 NOTE — PROGRESS NOTES
Excision Consult Note    Cary Carbone is a 57 y.o. female who is referred by Dr. Hilton for evaluation of a neoplasm of uncertain behavior on the right breast. It has grown and is tender.         Preoperative Risk Factors:  Current Anticoagulants: No  Endocarditis / Rheumatic Fever hx: No  Immunocompromised: No  Prosthetic joint: No  Congenital heart defect: No  Prosthetic heart valve: No  Diabetic: No  Transplant: No  Pacemaker: No  Defibrillator:  No  Prior problem with local anesthesia: No  Tobacco History: No]  Clindamycin Allergy: No  Pregnant: no     Transmissible Diseases:  HIV No  Hepatitis B or C  No      Exam:  Limited skin exam is normal except for a cyst  located on the R breast  .      Assessment and Plan:  Treatment Options : Given the indications and high cure rate, the patient has agreed to proceed with excision  Risks and Benefits : The rationale for excision was explained to the patient. The risks and benefits to therapy were discussed in detail. Specifically, the risks of infection, scarring, bleeding, dehiscence, hematoma, prolonged wound healing, incomplete removal, allergy to anesthesia, nerve injury, inability to clear the lesion and recurrence were addressed. The treatment site was clearly identified and confirmed by the patient.    Plan:  Excision    Eliud Hilton MD   Mohs Surgery/Dermatologic Oncology

## 2023-04-06 NOTE — PROGRESS NOTES
[facility]    Eliud Hilton MD    Elliptical Excision with Linear Closure    Tumor Type: NUB, suspect EIC   Location:  R breast  Derm-Path Accession #:  n/[id number]   Lesion Size:  2.6 x 2.1 cm   Surgical Margins: 0   Post op size: 2.6 x 2.1 cm   Level of Defect:  Fat  Repair Type: Complex Linear   Repair Length:  3.3 cm   Sutures: 4-0 PDS; 5-0 Prolene    Primary Surgeon: EDWARD Hilton MD      INDICATIONS:  The risks of bleeding, infection, discomfort, incomplete removal, and scar formation were explained to the patient.  All questions were answered.  After informed consent, confirmation of site and identity, and appropriate instructions, the patient underwent the procedure as follows:    PROCEDURE:  With the patient in a supine position, the lesion was outlined with the above margins. An ellipse was designed around the lesion to conform to relaxed skin tension lines in an effort to minimize scarring and deformity.  The patient was then placed in a supine position.  The lesion and surrounding skin were prepped with chlorhexidine, draped, and anesthetized with 1% lidocaine with epinephrine 1:100,100 buffered with 1:10 sodium bicarbonate.  Using a #15 blade, the skin was excised along premarked lines.  The resulting defect extended through deep subcutaneous tissue.  Wound margins were undermined to limit functional deformity/impairment of adjacent structures.  Bleeding vessels were controlled with  monopolar  electrodessication .  A deep placating retention suture was placed to offload tension to the deep margin. The dermis and subcutaneous tissue were closed with buried vertical mattress sutures.  Epidermal approximation was meticulously refined with simple running sutures, resulting in a linear closure with little to no wound tension.  Blood loss was estimated to be less than 5cc.  The area was coated with petrolatum and covered with a non-adherent dressing followed by gauze and tape.  Postoperative instructions  were reviewed per protocol.  The patient left alert and fully oriented.        Eliud Hilton MD

## 2023-04-11 ENCOUNTER — OFFICE VISIT (OUTPATIENT)
Dept: OBSTETRICS AND GYNECOLOGY | Facility: CLINIC | Age: 58
End: 2023-04-11
Payer: COMMERCIAL

## 2023-04-11 VITALS
BODY MASS INDEX: 33.78 KG/M2 | WEIGHT: 210.19 LBS | HEIGHT: 66 IN | OXYGEN SATURATION: 98 % | SYSTOLIC BLOOD PRESSURE: 140 MMHG | HEART RATE: 92 BPM | TEMPERATURE: 98 F | RESPIRATION RATE: 18 BRPM | DIASTOLIC BLOOD PRESSURE: 82 MMHG

## 2023-04-11 DIAGNOSIS — Z78.0 MENOPAUSE: Primary | ICD-10-CM

## 2023-04-11 DIAGNOSIS — Z79.899 FOLLOW-UP ENCOUNTER INVOLVING MEDICATION: ICD-10-CM

## 2023-04-11 LAB
ESTROGEN SERPL-MCNC: NORMAL PG/ML
INSULIN SERPL-ACNC: NORMAL U[IU]/ML
LAB AP GROSS DESCRIPTION: NORMAL
LAB AP LABORATORY NOTES: NORMAL
LAB AP SPEC A DDX: NORMAL
LAB AP SPEC A MORPHOLOGY: NORMAL
LAB AP SPEC A PROCEDURE: NORMAL
T3RU NFR SERPL: NORMAL %

## 2023-04-11 PROCEDURE — 99213 OFFICE O/P EST LOW 20 MIN: CPT | Mod: ,,, | Performed by: ADVANCED PRACTICE MIDWIFE

## 2023-04-11 PROCEDURE — 3077F SYST BP >= 140 MM HG: CPT | Mod: ,,, | Performed by: ADVANCED PRACTICE MIDWIFE

## 2023-04-11 PROCEDURE — 99213 PR OFFICE/OUTPT VISIT, EST, LEVL III, 20-29 MIN: ICD-10-PCS | Mod: ,,, | Performed by: ADVANCED PRACTICE MIDWIFE

## 2023-04-11 PROCEDURE — 3077F PR MOST RECENT SYSTOLIC BLOOD PRESSURE >= 140 MM HG: ICD-10-PCS | Mod: ,,, | Performed by: ADVANCED PRACTICE MIDWIFE

## 2023-04-11 PROCEDURE — 3044F HG A1C LEVEL LT 7.0%: CPT | Mod: ,,, | Performed by: ADVANCED PRACTICE MIDWIFE

## 2023-04-11 PROCEDURE — 1159F PR MEDICATION LIST DOCUMENTED IN MEDICAL RECORD: ICD-10-PCS | Mod: ,,, | Performed by: ADVANCED PRACTICE MIDWIFE

## 2023-04-11 PROCEDURE — 1159F MED LIST DOCD IN RCRD: CPT | Mod: ,,, | Performed by: ADVANCED PRACTICE MIDWIFE

## 2023-04-11 PROCEDURE — 3079F PR MOST RECENT DIASTOLIC BLOOD PRESSURE 80-89 MM HG: ICD-10-PCS | Mod: ,,, | Performed by: ADVANCED PRACTICE MIDWIFE

## 2023-04-11 PROCEDURE — 3008F PR BODY MASS INDEX (BMI) DOCUMENTED: ICD-10-PCS | Mod: ,,, | Performed by: ADVANCED PRACTICE MIDWIFE

## 2023-04-11 PROCEDURE — 3044F PR MOST RECENT HEMOGLOBIN A1C LEVEL <7.0%: ICD-10-PCS | Mod: ,,, | Performed by: ADVANCED PRACTICE MIDWIFE

## 2023-04-11 PROCEDURE — 3079F DIAST BP 80-89 MM HG: CPT | Mod: ,,, | Performed by: ADVANCED PRACTICE MIDWIFE

## 2023-04-11 PROCEDURE — 3008F BODY MASS INDEX DOCD: CPT | Mod: ,,, | Performed by: ADVANCED PRACTICE MIDWIFE

## 2023-04-11 NOTE — PROGRESS NOTES
Subjective:      Patient ID: Cary Carbone is a 57 y.o. female.    Chief Complaint:  Follow-up (medication)      History of Present Illness  Here for f/u hot flashes- states improved with Paxil desires to continue with present medication.    GYN & OB History  No LMP recorded (lmp unknown). Patient is postmenopausal.   Date of Last Pap: No result found    OB History    Para Term  AB Living   2 2 2     1   SAB IAB Ectopic Multiple Live Births           1      # Outcome Date GA Lbr Pepe/2nd Weight Sex Delivery Anes PTL Lv   2 Term 03/10/86    M CS-Unspec None  EMELIA   1 Term 83    M CS-Unspec None N        Review of Systems  Review of Systems   All other systems reviewed and are negative.       Objective:     Physical Exam:   Constitutional: She is oriented to person, place, and time. She appears well-developed and well-nourished.                           Neurological: She is alert and oriented to person, place, and time.    Skin: Skin is warm and dry.   Right upper left breast incision healing well with sutures noted- no redness, edema, or drainage noted.    Psychiatric: She has a normal mood and affect. Her behavior is normal. Judgment and thought content normal.       Assessment:     1. Menopause    2. Follow-up encounter involving medication               Plan:     F/u with dermatologist at scheduled   Continue paxil as prescribed  F/u as needed  Verbalized understanding to all instructions and information  Questions answered to desired level of satisfaction

## 2023-04-17 ENCOUNTER — CLINICAL SUPPORT (OUTPATIENT)
Dept: DERMATOLOGY | Facility: CLINIC | Age: 58
End: 2023-04-17
Payer: COMMERCIAL

## 2023-04-17 DIAGNOSIS — L08.9 SKIN INFECTION: ICD-10-CM

## 2023-04-17 DIAGNOSIS — Z48.02 ENCOUNTER FOR REMOVAL OF SUTURES: Primary | ICD-10-CM

## 2023-04-17 PROCEDURE — 87070 CULTURE, WOUND: ICD-10-PCS | Mod: ,,, | Performed by: CLINICAL MEDICAL LABORATORY

## 2023-04-17 PROCEDURE — 99024 PR POST-OP FOLLOW-UP VISIT: ICD-10-PCS | Mod: ,,, | Performed by: STUDENT IN AN ORGANIZED HEALTH CARE EDUCATION/TRAINING PROGRAM

## 2023-04-17 PROCEDURE — 87070 CULTURE OTHR SPECIMN AEROBIC: CPT | Mod: ,,, | Performed by: CLINICAL MEDICAL LABORATORY

## 2023-04-17 PROCEDURE — 99024 POSTOP FOLLOW-UP VISIT: CPT | Mod: ,,, | Performed by: STUDENT IN AN ORGANIZED HEALTH CARE EDUCATION/TRAINING PROGRAM

## 2023-04-17 NOTE — PROGRESS NOTES
Elliptical Excision with Linear Closure     Tumor Type: NUB, suspect EIC   Location:  R breast  Derm-Path Accession #:  n/[id number]   Lesion Size:  2.6 x 2.1 cm   Surgical Margins: 0   Post op size: 2.6 x 2.1 cm   Level of Defect:  Fat  Repair Type: Complex Linear   Repair Length:  3.3 cm   Sutures: 4-0 PDS; 5-0 Prolene    Sutures removed. Patient tolerated well. Patient c/o tenderness at incision site. Culture obtained. Patient to return to clinic in 6 weeks.     Danyelle Peña RN

## 2023-04-19 LAB — MICROORGANISM SPEC CULT: NORMAL

## 2023-06-01 ENCOUNTER — CLINICAL SUPPORT (OUTPATIENT)
Dept: DERMATOLOGY | Facility: CLINIC | Age: 58
End: 2023-06-01
Payer: COMMERCIAL

## 2023-06-01 DIAGNOSIS — L91.0 HYPERTROPHIC SCAR: ICD-10-CM

## 2023-06-01 DIAGNOSIS — Z48.89 ENCOUNTER FOR POST SURGICAL WOUND CHECK: Primary | ICD-10-CM

## 2023-06-01 PROCEDURE — 11900 INJECT SKIN LESIONS </W 7: CPT | Mod: ,,, | Performed by: STUDENT IN AN ORGANIZED HEALTH CARE EDUCATION/TRAINING PROGRAM

## 2023-06-01 PROCEDURE — 11900 PR INJECTION INTO SKIN LESIONS, UP TO 7: ICD-10-PCS | Mod: ,,, | Performed by: STUDENT IN AN ORGANIZED HEALTH CARE EDUCATION/TRAINING PROGRAM

## 2023-06-01 NOTE — PROGRESS NOTES
Tumor Type: NUB, suspect EIC   Location:  R breast  Derm-Path Accession #:  n/[id number]   Lesion Size:  2.6 x 2.1 cm   Surgical Margins: 0   Post op size: 2.6 x 2.1 cm   Level of Defect:  Fat  Repair Type: Complex Linear   Repair Length:  3.3 cm   Sutures: 4-0 PDS; 5-0 Prolene    Plan: Intralesional Kenalog    Treatment Number: 1  Lesions Injected: 1  Medication Injected: 10 mg/cc of Kenalog  Total Volume Injected: 0.5 cc  Lot Number: [id number]  Expiration Date: 08/2024  NDC #: 6293-4775-69  Administered by: Eliud Hilton MD     The risks of atrophy were reviewed with the patient.  RTC PRN  Estelle Beck CMA

## 2023-08-28 ENCOUNTER — OFFICE VISIT (OUTPATIENT)
Dept: PRIMARY CARE CLINIC | Facility: CLINIC | Age: 58
End: 2023-08-28
Payer: COMMERCIAL

## 2023-08-28 ENCOUNTER — HOSPITAL ENCOUNTER (OUTPATIENT)
Dept: RADIOLOGY | Facility: HOSPITAL | Age: 58
Discharge: HOME | End: 2023-08-28
Attending: NURSE PRACTITIONER
Payer: COMMERCIAL

## 2023-08-28 VITALS
HEART RATE: 67 BPM | RESPIRATION RATE: 17 BRPM | DIASTOLIC BLOOD PRESSURE: 86 MMHG | SYSTOLIC BLOOD PRESSURE: 134 MMHG | HEIGHT: 66 IN | TEMPERATURE: 98 F | BODY MASS INDEX: 33.91 KG/M2 | WEIGHT: 211 LBS | OXYGEN SATURATION: 98 %

## 2023-08-28 DIAGNOSIS — M25.561 CHRONIC PAIN OF RIGHT KNEE: ICD-10-CM

## 2023-08-28 DIAGNOSIS — D36.7 CYST, DERMOID, ARM, LEFT: ICD-10-CM

## 2023-08-28 DIAGNOSIS — I10 ESSENTIAL HYPERTENSION: Primary | ICD-10-CM

## 2023-08-28 DIAGNOSIS — G89.29 CHRONIC PAIN OF RIGHT KNEE: ICD-10-CM

## 2023-08-28 PROBLEM — R03.0 ELEVATED BLOOD PRESSURE READING WITHOUT DIAGNOSIS OF HYPERTENSION: Status: RESOLVED | Noted: 2021-11-01 | Resolved: 2023-08-28

## 2023-08-28 PROCEDURE — 99213 PR OFFICE/OUTPT VISIT, EST, LEVL III, 20-29 MIN: ICD-10-PCS | Mod: ,,, | Performed by: NURSE PRACTITIONER

## 2023-08-28 PROCEDURE — 3008F PR BODY MASS INDEX (BMI) DOCUMENTED: ICD-10-PCS | Mod: ,,, | Performed by: NURSE PRACTITIONER

## 2023-08-28 PROCEDURE — 73560 X-RAY EXAM OF KNEE 1 OR 2: CPT | Mod: 26,RT,, | Performed by: RADIOLOGY

## 2023-08-28 PROCEDURE — 1160F RVW MEDS BY RX/DR IN RCRD: CPT | Mod: ,,, | Performed by: NURSE PRACTITIONER

## 2023-08-28 PROCEDURE — 3079F PR MOST RECENT DIASTOLIC BLOOD PRESSURE 80-89 MM HG: ICD-10-PCS | Mod: ,,, | Performed by: NURSE PRACTITIONER

## 2023-08-28 PROCEDURE — 73560 X-RAY EXAM OF KNEE 1 OR 2: CPT | Mod: TC,RT

## 2023-08-28 PROCEDURE — 3008F BODY MASS INDEX DOCD: CPT | Mod: ,,, | Performed by: NURSE PRACTITIONER

## 2023-08-28 PROCEDURE — 73560 XR KNEE 1 OR 2 VIEW RIGHT: ICD-10-PCS | Mod: 26,RT,, | Performed by: RADIOLOGY

## 2023-08-28 PROCEDURE — 3075F SYST BP GE 130 - 139MM HG: CPT | Mod: ,,, | Performed by: NURSE PRACTITIONER

## 2023-08-28 PROCEDURE — 3044F PR MOST RECENT HEMOGLOBIN A1C LEVEL <7.0%: ICD-10-PCS | Mod: ,,, | Performed by: NURSE PRACTITIONER

## 2023-08-28 PROCEDURE — 3075F PR MOST RECENT SYSTOLIC BLOOD PRESS GE 130-139MM HG: ICD-10-PCS | Mod: ,,, | Performed by: NURSE PRACTITIONER

## 2023-08-28 PROCEDURE — 1159F MED LIST DOCD IN RCRD: CPT | Mod: ,,, | Performed by: NURSE PRACTITIONER

## 2023-08-28 PROCEDURE — 1160F PR REVIEW ALL MEDS BY PRESCRIBER/CLIN PHARMACIST DOCUMENTED: ICD-10-PCS | Mod: ,,, | Performed by: NURSE PRACTITIONER

## 2023-08-28 PROCEDURE — 3044F HG A1C LEVEL LT 7.0%: CPT | Mod: ,,, | Performed by: NURSE PRACTITIONER

## 2023-08-28 PROCEDURE — 99213 OFFICE O/P EST LOW 20 MIN: CPT | Mod: ,,, | Performed by: NURSE PRACTITIONER

## 2023-08-28 PROCEDURE — 1159F PR MEDICATION LIST DOCUMENTED IN MEDICAL RECORD: ICD-10-PCS | Mod: ,,, | Performed by: NURSE PRACTITIONER

## 2023-08-28 PROCEDURE — 3079F DIAST BP 80-89 MM HG: CPT | Mod: ,,, | Performed by: NURSE PRACTITIONER

## 2023-08-28 NOTE — PROGRESS NOTES
Subjective     Patient ID: Cary Carbone is a 57 y.o. female.    Chief Complaint: 6 Month fu for Essential Hypertension (Right knee pain and swelling and has a rash on left elbow.)    Pt presents for a 6 month follow-up for hypertension.       Review of Systems   Constitutional:  Negative for activity change, appetite change, chills, fatigue, fever and unexpected weight change.   HENT:  Negative for nasal congestion, ear discharge, hearing loss, nosebleeds, postnasal drip, rhinorrhea, sinus pressure/congestion, sneezing, sore throat, tinnitus and trouble swallowing.    Eyes:  Negative for pain, discharge, redness, itching and visual disturbance.   Respiratory:  Negative for cough, choking, chest tightness, shortness of breath and wheezing.    Cardiovascular:  Negative for chest pain and palpitations.   Gastrointestinal:  Negative for abdominal distention, abdominal pain, blood in stool, change in bowel habit, constipation, diarrhea, nausea, vomiting and change in bowel habit.   Endocrine: Negative for polydipsia and polyuria.   Genitourinary:  Negative for decreased urine volume, difficulty urinating, dysuria, flank pain, frequency and hematuria.   Musculoskeletal:  Positive for arthralgias and joint swelling. Negative for back pain, gait problem and neck pain.   Integumentary:  Negative for wound, breast mass and breast discharge.   Allergic/Immunologic: Negative for immunocompromised state.   Neurological:  Negative for dizziness, weakness, light-headedness and headaches.   Psychiatric/Behavioral:  Positive for dysphoric mood. Negative for agitation, behavioral problems, confusion and hallucinations.    Breast: Negative for mass         Objective     Physical Exam  Vitals and nursing note reviewed.   Constitutional:       Appearance: Normal appearance.   Cardiovascular:      Rate and Rhythm: Normal rate and regular rhythm.      Heart sounds: Normal heart sounds.   Pulmonary:      Effort: Pulmonary effort is  normal.      Breath sounds: Normal breath sounds.   Musculoskeletal:      Right knee: Swelling and erythema present.   Neurological:      Mental Status: She is alert and oriented to person, place, and time.   Psychiatric:         Mood and Affect: Mood normal.         Behavior: Behavior normal.            Assessment and Plan     1. Essential hypertension  -     CBC Auto Differential; Future; Expected date: 08/28/2023  -     Comprehensive Metabolic Panel; Future; Expected date: 08/28/2023  -     Lipid Panel; Future; Expected date: 08/28/2023  -     TSH; Future; Expected date: 08/28/2023    2. Chronic pain of right knee  -     X-Ray Knee 1 or 2 View Right; Future; Expected date: 08/28/2023  -     Ambulatory referral/consult to Orthopedics; Future; Expected date: 09/04/2023    3. Cyst, dermoid, arm, left    4. BMI 34.0-34.9,adult        Will call pt with lab results. Entered a referral to Dr. Yobani moreland for right knee pain. Xray today. She will make a follow-up with derm Dr. Hilton for cyst to left elbow and cyst to right breast.          Follow up in about 6 months (around 2/28/2024).

## 2023-09-05 ENCOUNTER — OFFICE VISIT (OUTPATIENT)
Dept: ORTHOPEDICS | Facility: CLINIC | Age: 58
End: 2023-09-05
Payer: COMMERCIAL

## 2023-09-05 DIAGNOSIS — M25.561 CHRONIC PAIN OF RIGHT KNEE: Primary | ICD-10-CM

## 2023-09-05 DIAGNOSIS — G89.29 CHRONIC PAIN OF RIGHT KNEE: Primary | ICD-10-CM

## 2023-09-05 PROCEDURE — 99999PBSHW PR PBB SHADOW TECHNICAL ONLY FILED TO HB: Mod: PBBFAC,,,

## 2023-09-05 PROCEDURE — 3044F PR MOST RECENT HEMOGLOBIN A1C LEVEL <7.0%: ICD-10-PCS | Mod: ,,, | Performed by: ORTHOPAEDIC SURGERY

## 2023-09-05 PROCEDURE — 99203 OFFICE O/P NEW LOW 30 MIN: CPT | Mod: S$PBB,25,, | Performed by: ORTHOPAEDIC SURGERY

## 2023-09-05 PROCEDURE — 20610 DRAIN/INJ JOINT/BURSA W/O US: CPT | Mod: PBBFAC | Performed by: ORTHOPAEDIC SURGERY

## 2023-09-05 PROCEDURE — 99203 PR OFFICE/OUTPT VISIT, NEW, LEVL III, 30-44 MIN: ICD-10-PCS | Mod: S$PBB,25,, | Performed by: ORTHOPAEDIC SURGERY

## 2023-09-05 PROCEDURE — 20610 LARGE JOINT ASPIRATION/INJECTION: R KNEE: ICD-10-PCS | Mod: S$PBB,RT,, | Performed by: ORTHOPAEDIC SURGERY

## 2023-09-05 PROCEDURE — 99999PBSHW PR PBB SHADOW TECHNICAL ONLY FILED TO HB: ICD-10-PCS | Mod: PBBFAC,,,

## 2023-09-05 PROCEDURE — 1159F MED LIST DOCD IN RCRD: CPT | Mod: ,,, | Performed by: ORTHOPAEDIC SURGERY

## 2023-09-05 PROCEDURE — 99213 OFFICE O/P EST LOW 20 MIN: CPT | Mod: PBBFAC | Performed by: ORTHOPAEDIC SURGERY

## 2023-09-05 PROCEDURE — 3044F HG A1C LEVEL LT 7.0%: CPT | Mod: ,,, | Performed by: ORTHOPAEDIC SURGERY

## 2023-09-05 PROCEDURE — 1159F PR MEDICATION LIST DOCUMENTED IN MEDICAL RECORD: ICD-10-PCS | Mod: ,,, | Performed by: ORTHOPAEDIC SURGERY

## 2023-09-05 RX ADMIN — BUPIVACAINE HYDROCHLORIDE 3 ML: 5 INJECTION, SOLUTION PERINEURAL at 01:09

## 2023-09-05 RX ADMIN — TRIAMCINOLONE ACETONIDE 40 MG: 400 INJECTION, SUSPENSION INTRA-ARTICULAR; INTRAMUSCULAR at 01:09

## 2023-09-05 NOTE — PROGRESS NOTES
ASSESSMENT:      ICD-10-CM ICD-9-CM   1. Chronic pain of right knee  M25.561 719.46    G89.29 338.29       PLAN:     Findings and treatment options were discussed with the patient regarding the diagnosis.   All questions were answered regarding Cary Carbone 's painful knee.      Natural history and expected course discussed. Questions answered. Educational materials distributed.  Injection given today.  Has some medial based knee OA. Should respond to injection and compression sleeve. If no better in 6 weeks, consider MRI  There are no Patient Instructions on file for this visit.    IMAGING:   X-Ray Knee 1 or 2 View Right    Result Date: 8/28/2023  EXAMINATION: XR KNEE 1 OR 2 VIEW RIGHT CLINICAL HISTORY: Pain in right knee COMPARISON: Right knee x-ray June 23, 2022 TECHNIQUE: Frontal and lateral views of the right knee. FINDINGS: Mild tricompartmental degenerative change of the knee.  Spurring of the superior and inferior poles of patella as well as the tibial tuberosity.  No convincing acute fracture or dislocation demonstrated. No concerning radiopaque foreign body visualized.     As above. Point of Service: [facility] Electronically signed by: Barry Valdez Date:    08/28/2023 Time:    10:07         CC: Knee pain    57 y.o. Female who presents as a new patient to me for evaluation of  right knee pain.    Occupation:   Pain has been present for a couple of months.  Injury description No specific injury. She states that she started having pain and swelling especially at night.    Mechanical symptoms, such as clicking, locking, and popping are present.    Swelling and effusions  are present.   The patient does  describe symptoms of knee instability, such as the knee buckling and the knee giving way.   Symptoms are worsened with activity.  Better with rest. Treatment thus far has included rest, activity modifications, and oral medications.    she  has not had formal physical  therapy.  she has not had prior injections injections into the knee.   she has not had previous advanced imaging such as MRI.     Here today to discuss diagnosis and treatment options.          REVIEW OF SYSTEMS:   Constitution: Negative. Negative for chills, fever and night sweats.    Hematologic/Lymphatic: Negative for bleeding problem. Does not bruise/bleed easily.   Skin: Negative for dry skin, itching and rash.   Musculoskeletal: Negative for falls. Positive for knee pain and muscle weakness.     All other review of symptoms were reviewed and found to be noncontributory.     PAST MEDICAL HISTORY:   Past Medical History:   Diagnosis Date    Elevated blood pressure reading without diagnosis of hypertension 2021    Essential hypertension 2021    Hypertension 2023       PAST SURGICAL HISTORY:   Past Surgical History:   Procedure Laterality Date     SECTION      COLONOSCOPY      over 10 years ago per patient @ [facility] 2021    [place]  2022    Results were negative per AMANDA Duran- recommended repeat in 3 yrs    TUBAL LIGATION         FAMILY HISTORY:   Family History   Problem Relation Age of Onset    Diabetes Mother     Breast cancer Maternal Aunt        SOCIAL HISTORY:   Social History     Socioeconomic History    Marital status:    Tobacco Use    Smoking status: Never    Smokeless tobacco: Never   Substance and Sexual Activity    Alcohol use: Never    Drug use: Never    Sexual activity: Yes     Partners: Male     Social Determinants of Health     Physical Activity: Insufficiently Active (2022)    Exercise Vital Sign     Days of Exercise per Week: 1 day     Minutes of Exercise per Session: 20 min   Stress: No Stress Concern Present (2022)    [employer] [employer] - Occupational Stress Questionnaire     Feeling of Stress : Not at all       MEDICATIONS:     Current Outpatient Medications:     alpha-D-galactosidase (BEANO ORAL), Take 1 tablet  by mouth once daily., Disp: , Rfl:     cholecalciferol, vitamin D3, 125 mcg (5,000 unit) capsule, Take 5,000 Units by mouth once daily., Disp: , Rfl:     hydroCHLOROthiazide (HYDRODIURIL) 12.5 MG Tab, Take 1 tablet (12.5 mg total) by mouth once daily., Disp: 90 tablet, Rfl: 3    latanoprost 0.005 % ophthalmic solution, Place into both eyes., Disp: , Rfl:     paroxetine (PAXIL) 10 MG tablet, Take 1 tablet (10 mg total) by mouth every morning., Disp: 30 tablet, Rfl: 11    ALLERGIES:   Review of patient's allergies indicates:  No Known Allergies     PHYSICAL EXAMINATION:    General    Constitutional: She is oriented to person, place, and time. She appears well-developed and well-nourished.   HENT:   Head: Normocephalic and atraumatic.   Nose: Nose normal.   Eyes: EOM are normal. Pupils are equal, round, and reactive to light.   Cardiovascular:  Normal rate and intact distal pulses.            Pulmonary/Chest: Effort normal. No respiratory distress. She exhibits no tenderness.   Abdominal: Soft. She exhibits no distension. There is no abdominal tenderness.   Neurological: She is alert and oriented to person, place, and time. She has normal reflexes.   Psychiatric: She has a normal mood and affect. Her behavior is normal. Judgment and thought content normal.           Right Knee Exam     Inspection   Swelling: present  Deformity: absent    Tenderness   The patient is tender to palpation of the medial retinaculum and medial joint line.    Crepitus   The patient has crepitus of the medial joint line and medial plica.    Range of Motion   Flexion:  abnormal     Tests   Meniscus   Reilly:  Medial - positive   Ligament Examination   Lachman: normal (-1 to 2mm)   MCL - Valgus: normal (0 to 2mm)  LCL - Varus: normal  Dial Test at 30 degrees: normal (< 5 degrees)  Posterolateral Corner: unstable (>15 degrees difference)  Patella   Patellar apprehension: negative  Patellar Grind: positive    Other   Sensation:  normal    Comments:  Pain with Thessaly Maneuver    Left Knee Exam   Left knee exam is normal.    Muscle Strength   Right Lower Extremity   Quadriceps:  5/5   Hamstrin/5     Reflexes     Right Side   Achilles:  2+  Quadriceps:  2+    Vascular Exam     Right Pulses  Dorsalis Pedis:      2+  Posterior Tibial:      2+            Orders Placed This Encounter   Procedures    Large Joint Aspiration/Injection: R knee     This order was created via procedure documentation       Large Joint Aspiration/Injection: R knee    Date/Time: 2023 1:00 PM    Performed by: Leonid Hilton MD  Authorized by: Leonid Hilton MD    Consent Done?:  Yes (Verbal)  Indications:  Pain  Local anesthesia used?: No      Details:  Needle Size:  22 G  Approach:  Superior  Location:  Knee  Site:  R knee  Medications:  3 mL BUPivacaine 0.5 % (5 mg/mL); 40 mg triamcinolone acetonide 40 mg/mL  Patient tolerance:  Patient tolerated the procedure well with no immediate complications

## 2023-09-06 RX ORDER — TRIAMCINOLONE ACETONIDE 40 MG/ML
40 INJECTION, SUSPENSION INTRA-ARTICULAR; INTRAMUSCULAR
Status: DISCONTINUED | OUTPATIENT
Start: 2023-09-05 | End: 2023-09-06 | Stop reason: HOSPADM

## 2023-09-06 RX ORDER — BUPIVACAINE HYDROCHLORIDE 5 MG/ML
3 INJECTION, SOLUTION PERINEURAL
Status: DISCONTINUED | OUTPATIENT
Start: 2023-09-05 | End: 2023-09-06 | Stop reason: HOSPADM

## 2023-09-26 ENCOUNTER — OFFICE VISIT (OUTPATIENT)
Dept: DERMATOLOGY | Facility: CLINIC | Age: 58
End: 2023-09-26
Payer: COMMERCIAL

## 2023-09-26 DIAGNOSIS — L30.9 DERMATITIS, UNSPECIFIED: ICD-10-CM

## 2023-09-26 DIAGNOSIS — L91.0 KELOID: Primary | ICD-10-CM

## 2023-09-26 DIAGNOSIS — B37.2 CANDIDAL INTERTRIGO: ICD-10-CM

## 2023-09-26 PROCEDURE — 3044F PR MOST RECENT HEMOGLOBIN A1C LEVEL <7.0%: ICD-10-PCS | Mod: ,,, | Performed by: STUDENT IN AN ORGANIZED HEALTH CARE EDUCATION/TRAINING PROGRAM

## 2023-09-26 PROCEDURE — 1159F PR MEDICATION LIST DOCUMENTED IN MEDICAL RECORD: ICD-10-PCS | Mod: ,,, | Performed by: STUDENT IN AN ORGANIZED HEALTH CARE EDUCATION/TRAINING PROGRAM

## 2023-09-26 PROCEDURE — 11900 PR INJECTION INTO SKIN LESIONS, UP TO 7: ICD-10-PCS | Mod: ,,, | Performed by: STUDENT IN AN ORGANIZED HEALTH CARE EDUCATION/TRAINING PROGRAM

## 2023-09-26 PROCEDURE — 99214 PR OFFICE/OUTPT VISIT, EST, LEVL IV, 30-39 MIN: ICD-10-PCS | Mod: 25,,, | Performed by: STUDENT IN AN ORGANIZED HEALTH CARE EDUCATION/TRAINING PROGRAM

## 2023-09-26 PROCEDURE — 3044F HG A1C LEVEL LT 7.0%: CPT | Mod: ,,, | Performed by: STUDENT IN AN ORGANIZED HEALTH CARE EDUCATION/TRAINING PROGRAM

## 2023-09-26 PROCEDURE — 1159F MED LIST DOCD IN RCRD: CPT | Mod: ,,, | Performed by: STUDENT IN AN ORGANIZED HEALTH CARE EDUCATION/TRAINING PROGRAM

## 2023-09-26 PROCEDURE — 99214 OFFICE O/P EST MOD 30 MIN: CPT | Mod: 25,,, | Performed by: STUDENT IN AN ORGANIZED HEALTH CARE EDUCATION/TRAINING PROGRAM

## 2023-09-26 PROCEDURE — 11900 INJECT SKIN LESIONS </W 7: CPT | Mod: ,,, | Performed by: STUDENT IN AN ORGANIZED HEALTH CARE EDUCATION/TRAINING PROGRAM

## 2023-09-26 RX ORDER — TRIAMCINOLONE ACETONIDE 1 MG/G
OINTMENT TOPICAL 2 TIMES DAILY
Qty: 80 G | Refills: 5 | Status: SHIPPED | OUTPATIENT
Start: 2023-09-26

## 2023-09-26 RX ORDER — NYSTATIN 100000 [USP'U]/G
POWDER TOPICAL 2 TIMES DAILY
Qty: 60 G | Refills: 5 | Status: SHIPPED | OUTPATIENT
Start: 2023-09-26

## 2023-09-26 NOTE — PROGRESS NOTES
[facility]  Eliud Hilton MD    [facility] [address]  [phone]    Fax: [phone]    Patient Name: Cary Carbone  Medical Record Number: [id number]  PCP: Delores Bauer FNP  Age: 58 y.o. : 1965  Contact: [phone] (home) [phone] (work)    CC: skin lesion  History of Present Illness:     Cary Carbone is a 58 y.o.  female with no history of skin cancer  who presents to clinic today for a tender skin lesion s/p excision of a cyst from her right breast. She is also concerned about a thicken skin lesion on the left elbow and a rash under her breast.     The patient has no other concerns today.    Review of Systems:     Unremarkable other than mentioned above.     Physical Exam:     General: Relaxed, oriented, alert    Skin examination of the scalp, face, neck, chest, back, abdomen, upper extremities and lower extremities were normal except for as listed below      Assessment and Plan:     1. Keloid (91.0)  - firm telangiectatic tumor located on the right breast    Plan: Intralesional Kenalog    Lesions Injected: 1  Medication Injected: 10 mg/cc of Kenalog  Total Volume Injected: 0.5 cc  NDC #: 6294-2524-46  Lot: [id number]  Expiration: 2024  Administered by: Eliud Hilton MD     The risks of atrophy were reviewed with the patient.      2.  Candidal intertrigo   - erythema and maceration with satellite papules   - nystatin powder bid PRN   - recommend keeping the area cool and dry   - fluconazole orally can be used. If the yeast is resistant to fluconazole, may need to try itraconazole      3. Dermatitis Unspecified  - eczematous patch on the left elbow  DDx: AD vs other    Plan:  - TAC 0.1 % Ointment    Counseling  I counseled the patient regarding the following:  Skin care: Patient instructed to use gentle skin care including dove unscented soap, CeraVe moisturizing cream, and fragrance free laundry detergent.  Expectations: The  patient understands that there is not a definitive diagnosis at this time. Further testing or empiric therapy may be necessary to diagnose and improve the condition.  Contact office if: The patient develops a fever, or rash dramatically worsens despite treatment.     Return to clinic in 8 weeks     AVS printed with patient instructions     Eliud Hilton MD   Mohs Surgery/Dermatologic Oncology  Dermatology

## 2023-10-17 ENCOUNTER — OFFICE VISIT (OUTPATIENT)
Dept: ORTHOPEDICS | Facility: CLINIC | Age: 58
End: 2023-10-17
Payer: COMMERCIAL

## 2023-10-17 DIAGNOSIS — G89.29 CHRONIC PAIN OF RIGHT KNEE: Primary | ICD-10-CM

## 2023-10-17 DIAGNOSIS — M25.561 CHRONIC PAIN OF RIGHT KNEE: Primary | ICD-10-CM

## 2023-10-17 DIAGNOSIS — M22.40 CHONDROMALACIA OF PATELLA, UNSPECIFIED LATERALITY: ICD-10-CM

## 2023-10-17 PROCEDURE — 3044F PR MOST RECENT HEMOGLOBIN A1C LEVEL <7.0%: ICD-10-PCS | Mod: ,,, | Performed by: ORTHOPAEDIC SURGERY

## 2023-10-17 PROCEDURE — 99212 OFFICE O/P EST SF 10 MIN: CPT | Mod: PBBFAC | Performed by: ORTHOPAEDIC SURGERY

## 2023-10-17 PROCEDURE — 99213 PR OFFICE/OUTPT VISIT, EST, LEVL III, 20-29 MIN: ICD-10-PCS | Mod: S$PBB,,, | Performed by: ORTHOPAEDIC SURGERY

## 2023-10-17 PROCEDURE — 3044F HG A1C LEVEL LT 7.0%: CPT | Mod: ,,, | Performed by: ORTHOPAEDIC SURGERY

## 2023-10-17 PROCEDURE — 99213 OFFICE O/P EST LOW 20 MIN: CPT | Mod: S$PBB,,, | Performed by: ORTHOPAEDIC SURGERY

## 2023-10-17 NOTE — LETTER
October 17, 2023      [facility]  [address] [place] [address]  Phone: [phone]  Fax: [phone]       Patient: Cary Carbone   YOB: 1965  Date of Visit: 10/17/2023    To Whom It May Concern:    Dona Carbone  was at [facility] on 10/17/2023. The patient may return to work/school on 10/18/2023 with no restrictions. If you have any questions or concerns, or if I can be of further assistance, please do not hesitate to contact me.    Sincerely,    Dr Leonid Hilton

## 2023-10-17 NOTE — PROGRESS NOTES
CC:  Knee pain    58 y.o. Female returns to clinic for a follow up visit regarding knee pain.       States she had good relief from last injection. Pain still wakes her up.       Past Medical History:   Diagnosis Date    Elevated blood pressure reading without diagnosis of hypertension 2021    Essential hypertension 2021    Hypertension 2023     Past Surgical History:   Procedure Laterality Date     SECTION      COLONOSCOPY      over 10 years ago per patient @ [facility] 2021    [place]  2022    Results were negative per AMANDA Duran- recommended repeat in 3 yrs    TUBAL LIGATION           PHYSICAL EXAMINATION:  LMP  (LMP Unknown)   General    Constitutional: She is oriented to person, place, and time. She appears well-developed and well-nourished.   HENT:   Head: Normocephalic and atraumatic.   Eyes: EOM are normal. Pupils are equal, round, and reactive to light.   Neck: Neck supple.   Cardiovascular:  Normal rate and intact distal pulses.            Pulmonary/Chest: Effort normal and breath sounds normal.   Neurological: She is alert and oriented to person, place, and time. She has normal reflexes. No cranial nerve deficit. She exhibits normal muscle tone. Coordination normal.   Psychiatric: She has a normal mood and affect. Her behavior is normal. Judgment normal.           Right Knee Exam     Tenderness   The patient is tender to palpation of the lateral retinaculum and condyle.    Range of Motion   Extension:  normal   Flexion:  normal     Tests   Meniscus   Reilly:  Medial - negative   Ligament Examination   Lachman: normal (-1 to 2mm)   Pivot Shift: normal (Equal)    Left Knee Exam     Inspection   Scars: absent    Muscle Strength   Right Lower Extremity   Hip Abduction: 5/5   Quadriceps:  5/5   Hamstrin/5         IMAGING:  No results found.     ASSESSMENT:      ICD-10-CM ICD-9-CM   1. Chronic pain of right knee  M25.561 719.46    G89.29 338.29   2. Chondromalacia  of patella, unspecified laterality  M22.40 717.7       PLAN:     -Findings and treatment options were discussed with the patient  -All questions answered  Natural history and expected course discussed. Questions answered.  Educational materials distributed.  MRI.  Given the above exam findings, I suspect the patient has a meniscus tear or possibly a chondral injury. I have ordered and reviewed her radiographs today and would like to obtain advanced imaging as this time as I am highly concerned for meniscal, chondral, and/or ligamentous injury in this painful knee.  Typical operative and nonoperative treatment measures were reviewed in great detail today. Risks, benefits, and alternatives as it relates to this potential injury were discussed today.  Plan is to proceed with an MRI to assess for internal derangement. Will follow-up after study to discuss results. Will reconsider treatment options at that time.         There are no Patient Instructions on file for this visit.      Orders Placed This Encounter   Procedures    MRI Knee Without Contrast Right         Procedures

## 2023-11-07 ENCOUNTER — CLINICAL SUPPORT (OUTPATIENT)
Dept: DERMATOLOGY | Facility: CLINIC | Age: 58
End: 2023-11-07
Payer: COMMERCIAL

## 2023-11-07 DIAGNOSIS — L91.0 KELOID: Primary | ICD-10-CM

## 2023-11-07 DIAGNOSIS — Z48.89 ENCOUNTER FOR POST SURGICAL WOUND CHECK: ICD-10-CM

## 2023-11-07 PROCEDURE — 99499 NO LOS: ICD-10-PCS | Mod: ,,, | Performed by: STUDENT IN AN ORGANIZED HEALTH CARE EDUCATION/TRAINING PROGRAM

## 2023-11-07 PROCEDURE — 99499 UNLISTED E&M SERVICE: CPT | Mod: ,,, | Performed by: STUDENT IN AN ORGANIZED HEALTH CARE EDUCATION/TRAINING PROGRAM

## 2023-11-07 NOTE — PROGRESS NOTES
Pt came in for ILK injection to keloid of R breast.  Scar has healed well, so we will hold off ILK for now   RTC PRN  Estelle Beck,CMA

## 2023-11-27 ENCOUNTER — OFFICE VISIT (OUTPATIENT)
Dept: ORTHOPEDICS | Facility: CLINIC | Age: 58
End: 2023-11-27
Payer: COMMERCIAL

## 2023-11-27 ENCOUNTER — TELEPHONE (OUTPATIENT)
Dept: ORTHOPEDICS | Facility: CLINIC | Age: 58
End: 2023-11-27
Payer: COMMERCIAL

## 2023-11-27 DIAGNOSIS — M17.11 PRIMARY OSTEOARTHRITIS OF RIGHT KNEE: Primary | ICD-10-CM

## 2023-11-27 PROCEDURE — 99999PBSHW PR PBB SHADOW TECHNICAL ONLY FILED TO HB: Mod: PBBFAC,,,

## 2023-11-27 PROCEDURE — 99999PBSHW PR PBB SHADOW TECHNICAL ONLY FILED TO HB: ICD-10-PCS | Mod: PBBFAC,,,

## 2023-11-27 PROCEDURE — 20610 DRAIN/INJ JOINT/BURSA W/O US: CPT | Mod: PBBFAC | Performed by: ORTHOPAEDIC SURGERY

## 2023-11-27 PROCEDURE — 99214 OFFICE O/P EST MOD 30 MIN: CPT | Mod: S$PBB,25,, | Performed by: ORTHOPAEDIC SURGERY

## 2023-11-27 PROCEDURE — 20610 LARGE JOINT ASPIRATION/INJECTION: R KNEE: ICD-10-PCS | Mod: S$PBB,RT,, | Performed by: ORTHOPAEDIC SURGERY

## 2023-11-27 PROCEDURE — 3044F HG A1C LEVEL LT 7.0%: CPT | Mod: ,,, | Performed by: ORTHOPAEDIC SURGERY

## 2023-11-27 PROCEDURE — 3044F PR MOST RECENT HEMOGLOBIN A1C LEVEL <7.0%: ICD-10-PCS | Mod: ,,, | Performed by: ORTHOPAEDIC SURGERY

## 2023-11-27 PROCEDURE — 99214 PR OFFICE/OUTPT VISIT, EST, LEVL IV, 30-39 MIN: ICD-10-PCS | Mod: S$PBB,25,, | Performed by: ORTHOPAEDIC SURGERY

## 2023-11-27 PROCEDURE — 99212 OFFICE O/P EST SF 10 MIN: CPT | Mod: PBBFAC | Performed by: ORTHOPAEDIC SURGERY

## 2023-11-27 RX ORDER — BUPIVACAINE HYDROCHLORIDE 5 MG/ML
3 INJECTION, SOLUTION PERINEURAL
Status: DISCONTINUED | OUTPATIENT
Start: 2023-11-27 | End: 2023-11-27 | Stop reason: HOSPADM

## 2023-11-27 RX ORDER — TRIAMCINOLONE ACETONIDE 40 MG/ML
40 INJECTION, SUSPENSION INTRA-ARTICULAR; INTRAMUSCULAR
Status: DISCONTINUED | OUTPATIENT
Start: 2023-11-27 | End: 2023-11-27 | Stop reason: HOSPADM

## 2023-11-27 RX ADMIN — BUPIVACAINE HYDROCHLORIDE 3 ML: 5 INJECTION, SOLUTION PERINEURAL at 04:11

## 2023-11-27 RX ADMIN — TRIAMCINOLONE ACETONIDE 40 MG: 400 INJECTION, SUSPENSION INTRA-ARTICULAR; INTRAMUSCULAR at 04:11

## 2023-11-27 NOTE — PROGRESS NOTES
CC:  Knee pain    58 y.o. Female returns to clinic for a follow up visit regarding knee pain.       Pt is here for MRI f/u and treatment moving forward.       Past Medical History:   Diagnosis Date    Elevated blood pressure reading without diagnosis of hypertension 2021    Essential hypertension 2021    Hypertension 2023     Past Surgical History:   Procedure Laterality Date     SECTION      COLONOSCOPY      over 10 years ago per patient @ [facility] 2021    [place]  2022    Results were negative per AMANDA Duran- recommended repeat in 3 yrs    TUBAL LIGATION           PHYSICAL EXAMINATION:  LMP  (LMP Unknown)   General    Nursing note and vitals reviewed.  Constitutional: She is oriented to person, place, and time. She appears well-developed and well-nourished.   HENT:   Head: Normocephalic and atraumatic.   Nose: Nose normal.   Eyes: Pupils are equal, round, and reactive to light.   Neck: Neck supple.   Cardiovascular:  Normal rate, regular rhythm and intact distal pulses.            Pulmonary/Chest: Effort normal. No respiratory distress. She exhibits no tenderness.   Abdominal: Soft. She exhibits no distension. There is no abdominal tenderness.   Neurological: She is alert and oriented to person, place, and time. She has normal reflexes.   Psychiatric: She has a normal mood and affect. Her behavior is normal. Judgment and thought content normal.     General Musculoskeletal Exam   Gait: antalgic       Right Knee Exam     Inspection   Swelling: present  Effusion: present  Deformity: present    Tenderness   The patient is tender to palpation of the medial joint line.    Crepitus   The patient has crepitus of the patella and medial joint line.    Range of Motion   Extension:  abnormal   Flexion:  abnormal     Tests   Meniscus   Reilly:  Medial - positive   Ligament Examination   Lachman: normal (-1 to 2mm)   PCL-Posterior Drawer: normal (0 to 2mm)     MCL - Valgus:  normal (0 to 2mm)  LCL - Varus: normal  Pivot Shift: normal (Equal)  Reverse Pivot Shift: normal (Equal)  Dial Test at 30 degrees: normal (< 5 degrees)  Dial Test at 90 degrees: normal (< 5 degrees)  Posterior Sag Test: negative  Posterolateral Corner: unstable (>15 degrees difference)  Patella   Patellar Tracking: normal  Q-Angle at 90 degrees: normal  Patellar Grind: positive    Other   Sensation: normal    Muscle Strength   Right Lower Extremity   Quadriceps:  5/5   Hamstrin/5     Reflexes     Right Side   Quadriceps:  2+    Vascular Exam     Right Pulses  Dorsalis Pedis:      2+  Posterior Tibial:      2+          IMAGING:  MRI Knee Without Contrast Right    Result Date: 2023  EXAMINATION: MRI KNEE WITHOUT CONTRAST RIGHT CLINICAL HISTORY: RIGHT KNNE PAIN; Pain in right knee TECHNIQUE: Axial sagittal and coronal imaging of the knee is performed using T1, proton density, proton density fat-sat, STIR and gradient sequences. COMPARISON: 2023 FINDINGS: The anterior and posterior cruciate ligaments are intact without evidence of tear. There is increased signal involving the anterior horn body and posterior horn of the medial meniscus.  The meniscus is partially extruded from the joint.  The signal does not definitely extend articular surface.  Small amount of artifact in the joint.  There is loss of articular cartilage with small osteophytes and subchondral changes more prominent on the tibial side of the knee. Lateral meniscus appears within normal limits. The medial and lateral collateral ligament complexes are intact without evidence of abnormality. The extensor mechanism is intact and appears within normal limits. No abnormal osseous of marrow signal or edema is seen. No focal cartilage defect is present. There is Baker's cyst present posteriorly estimated 5 cm in length with a width up to 1.1 cm.  There is edema  tracking along the medial gastrocnemius muscle which approximates the cyst.      Increased signal majority of the medial meniscus that does not appear to extend to meniscal surface could indicate degeneration versus intrasubstance tear.  Mild-to-moderate medial compartment osteoarthrosis.  Baker's cyst with cyst leak present. Electronically signed by: Derik Holland Date:    11/21/2023 Time:    12:21       ASSESSMENT:      ICD-10-CM ICD-9-CM   1. Primary osteoarthritis of right knee  M17.11 715.16       PLAN:     -Findings and treatment options were discussed with the patient  -All questions answered  Natural history and expected course discussed. Questions answered.  Educational materials distributed.  Rest, ice, compression, and elevation (RICE) therapy.  Reduction in offending activity.  Arthrocentesis. See procedure note.    The conservative options including NSAIDs, activity modification, physical therapy, corticosteroid injection, and viscosupplimentation were discussed. She is interested in surgical intervention at this time.    The surgical process of knee replacement was discussed in detail with the patient including a detailed discussion of the procedure itself (including visual model, x-ray review, and literature review). The typical perioperative and post-operative course was discussed and perioperative risks were discussed to the patient's satisfaction.  Risks and complications discussed included but were not limited to the risks of anesthetic complications, infection, bleeding, wound healing complications, aseptic loosening, instability, limb length inequality, neurologic dysfunction including numbness,  DVT, pulmonary embolism, perioperative medical risks (cardiac, pulmonary, renal, neurologic), and death and the patient elected to try a repeat injection at this point.      Understand knee arthroscopy may be an option too and we went over this in great detail.     There are no Patient Instructions on file for this visit.      No orders of the defined types were placed in this  encounter.        Large Joint Aspiration/Injection: R knee    Date/Time: 11/27/2023 4:00 PM    Performed by: Leonid Hilton MD  Authorized by: Leonid Hilton MD    Consent Done?:  Yes (Verbal)  Indications:  Pain  Local anesthesia used?: No      Details:  Needle Size:  22 G  Approach:  Superior  Location:  Knee  Site:  R knee  Medications:  3 mL BUPivacaine 0.5 % (5 mg/mL); 40 mg triamcinolone acetonide 40 mg/mL  Patient tolerance:  Patient tolerated the procedure well with no immediate complications

## 2023-11-27 NOTE — TELEPHONE ENCOUNTER
----- Message from Leonid Hilton MD sent at 11/27/2023  7:21 AM CST -----  Come in today around 4 to go over results

## 2024-01-23 ENCOUNTER — TELEPHONE (OUTPATIENT)
Dept: ORTHOPEDICS | Facility: CLINIC | Age: 59
End: 2024-01-23
Payer: COMMERCIAL

## 2024-01-23 NOTE — TELEPHONE ENCOUNTER
----- Message from Opal Sevilla sent at 1/23/2024  9:01 AM CST -----  r #patient is wanting to know is there anything else that she need to do before her surgery on the 25 Jan her call back number [phone]

## 2024-01-25 ENCOUNTER — OFFICE VISIT (OUTPATIENT)
Dept: ORTHOPEDICS | Facility: CLINIC | Age: 59
End: 2024-01-25
Payer: COMMERCIAL

## 2024-01-25 DIAGNOSIS — M17.11 PRIMARY OSTEOARTHRITIS OF RIGHT KNEE: Primary | ICD-10-CM

## 2024-01-25 PROCEDURE — 99214 OFFICE O/P EST MOD 30 MIN: CPT | Mod: S$PBB,,, | Performed by: ORTHOPAEDIC SURGERY

## 2024-01-25 PROCEDURE — 99212 OFFICE O/P EST SF 10 MIN: CPT | Mod: PBBFAC | Performed by: ORTHOPAEDIC SURGERY

## 2024-01-25 NOTE — H&P (VIEW-ONLY)
CC:  Knee pain    58 y.o. Female returns to clinic for a follow up visit regarding knee pain.       Patient is here for recheck right knee. States her last injection did not give her relief, only lasted a few days.       Past Medical History:   Diagnosis Date    Elevated blood pressure reading without diagnosis of hypertension 2021    Essential hypertension 2021    Hypertension 2023     Past Surgical History:   Procedure Laterality Date     SECTION      COLONOSCOPY      over 10 years ago per patient @ [facility] 2021    [place]  2022    Results were negative per AMANDA Duran- recommended repeat in 3 yrs    TUBAL LIGATION           PHYSICAL EXAMINATION:  LMP  (LMP Unknown)   General    Nursing note and vitals reviewed.  Constitutional: She is oriented to person, place, and time. She appears well-developed and well-nourished.   HENT:   Head: Normocephalic and atraumatic.   Nose: Nose normal.   Eyes: Pupils are equal, round, and reactive to light.   Neck: Neck supple.   Cardiovascular:  Normal rate, regular rhythm and intact distal pulses.            Pulmonary/Chest: Effort normal. No respiratory distress. She exhibits no tenderness.   Abdominal: Soft. She exhibits no distension. There is no abdominal tenderness.   Neurological: She is alert and oriented to person, place, and time. She has normal reflexes.   Psychiatric: She has a normal mood and affect. Her behavior is normal. Judgment and thought content normal.     General Musculoskeletal Exam   Gait: antalgic       Right Knee Exam     Inspection   Swelling: present  Effusion: present  Deformity: present    Tenderness   The patient is tender to palpation of the medial joint line.    Crepitus   The patient has crepitus of the patella and medial joint line.    Range of Motion   Extension:  abnormal   Flexion:  abnormal     Tests   Meniscus   Reilly:  Medial - positive   Ligament Examination   Lachman: normal (-1 to 2mm)    PCL-Posterior Drawer: normal (0 to 2mm)     MCL - Valgus: normal (0 to 2mm)  LCL - Varus: normal  Pivot Shift: normal (Equal)  Reverse Pivot Shift: normal (Equal)  Dial Test at 30 degrees: normal (< 5 degrees)  Dial Test at 90 degrees: normal (< 5 degrees)  Posterior Sag Test: negative  Posterolateral Corner: unstable (>15 degrees difference)  Patella   Patellar Tracking: normal  Q-Angle at 90 degrees: normal  Patellar Grind: positive    Other   Sensation: normal    Muscle Strength   Right Lower Extremity   Quadriceps:  5/5   Hamstrin/5     Reflexes     Right Side   Quadriceps:  2+    Vascular Exam     Right Pulses  Dorsalis Pedis:      2+  Posterior Tibial:      2+            IMAGING:      MRI KNEE WITHOUT CONTRAST RIGHT     CLINICAL HISTORY:  RIGHT KNNE PAIN; Pain in right knee     TECHNIQUE:  Axial sagittal and coronal imaging of the knee is performed using T1, proton density, proton density fat-sat, STIR and gradient sequences.     COMPARISON:  2023     FINDINGS:  The anterior and posterior cruciate ligaments are intact without evidence of tear.     There is increased signal involving the anterior horn body and posterior horn of the medial meniscus.  The meniscus is partially extruded from the joint.  The signal does not definitely extend articular surface.  Small amount of artifact in the joint.  There is loss of articular cartilage with small osteophytes and subchondral changes more prominent on the tibial side of the knee.     Lateral meniscus appears within normal limits.     The medial and lateral collateral ligament complexes are intact without evidence of abnormality.     The extensor mechanism is intact and appears within normal limits.     No abnormal osseous of marrow signal or edema is seen. No focal cartilage defect is present.     There is Baker's cyst present posteriorly estimated 5 cm in length with a width up to 1.1 cm.  There is edema  tracking along the medial gastrocnemius  muscle which approximates the cyst.     Impression:     Increased signal majority of the medial meniscus that does not appear to extend to meniscal surface could indicate degeneration versus intrasubstance tear.  Mild-to-moderate medial compartment osteoarthrosis.  Baker's cyst with cyst leak present.         ASSESSMENT:      ICD-10-CM ICD-9-CM   1. Primary osteoarthritis of right knee  M17.11 715.16       PLAN:     -Findings and treatment options were discussed with the patient  -All questions answered  Natural history and expected course discussed. Questions answered.  Educational materials distributed.  She has fairly pronounced degenerative changes in the medial compartment with bony edema demonstrated.  I do not think that meniscus surgery in cartilage restoration surgery would be helpful in this case.  I think her best surgical treatment option would be right knee arthroplasty.    The conservative options including NSAIDs, activity modification, physical therapy, corticosteroid injection, and viscosupplimentation were discussed. She is interested in surgical intervention at this time.    The surgical process of knee replacement was discussed in detail with the patient including a detailed discussion of the procedure itself (including visual model, x-ray review, and literature review). The typical perioperative and post-operative course was discussed and perioperative risks were discussed to the patient's satisfaction.  Risks and complications discussed included but were not limited to the risks of anesthetic complications, infection, bleeding, wound healing complications, aseptic loosening, instability, limb length inequality, neurologic dysfunction including numbness,  DVT, pulmonary embolism, perioperative medical risks (cardiac, pulmonary, renal, neurologic), and death and the patient elects to proceed. We will initiate pre-operative medical evaluation and clearance and set a provisional date for surgical  intervention according to the patient's schedule.   I have discussed anticoagulation with aspirin and coumadin and in low risk patients I have recommended aspirin twice a day.  25 minutes was spent in direct consultation with the patient counselling her on the items listed above.      There are no Patient Instructions on file for this visit.      No orders of the defined types were placed in this encounter.        Procedures

## 2024-01-25 NOTE — PROGRESS NOTES
CC:  Knee pain    58 y.o. Female returns to clinic for a follow up visit regarding knee pain.       Patient is here for recheck right knee. States her last injection did not give her relief, only lasted a few days.       Past Medical History:   Diagnosis Date    Elevated blood pressure reading without diagnosis of hypertension 2021    Essential hypertension 2021    Hypertension 2023     Past Surgical History:   Procedure Laterality Date     SECTION      COLONOSCOPY      over 10 years ago per patient @ [facility] 2021    [place]  2022    Results were negative per AMANDA Duran- recommended repeat in 3 yrs    TUBAL LIGATION           PHYSICAL EXAMINATION:  LMP  (LMP Unknown)   General    Nursing note and vitals reviewed.  Constitutional: She is oriented to person, place, and time. She appears well-developed and well-nourished.   HENT:   Head: Normocephalic and atraumatic.   Nose: Nose normal.   Eyes: Pupils are equal, round, and reactive to light.   Neck: Neck supple.   Cardiovascular:  Normal rate, regular rhythm and intact distal pulses.            Pulmonary/Chest: Effort normal. No respiratory distress. She exhibits no tenderness.   Abdominal: Soft. She exhibits no distension. There is no abdominal tenderness.   Neurological: She is alert and oriented to person, place, and time. She has normal reflexes.   Psychiatric: She has a normal mood and affect. Her behavior is normal. Judgment and thought content normal.     General Musculoskeletal Exam   Gait: antalgic       Right Knee Exam     Inspection   Swelling: present  Effusion: present  Deformity: present    Tenderness   The patient is tender to palpation of the medial joint line.    Crepitus   The patient has crepitus of the patella and medial joint line.    Range of Motion   Extension:  abnormal   Flexion:  abnormal     Tests   Meniscus   Reilly:  Medial - positive   Ligament Examination   Lachman: normal (-1 to 2mm)    PCL-Posterior Drawer: normal (0 to 2mm)     MCL - Valgus: normal (0 to 2mm)  LCL - Varus: normal  Pivot Shift: normal (Equal)  Reverse Pivot Shift: normal (Equal)  Dial Test at 30 degrees: normal (< 5 degrees)  Dial Test at 90 degrees: normal (< 5 degrees)  Posterior Sag Test: negative  Posterolateral Corner: unstable (>15 degrees difference)  Patella   Patellar Tracking: normal  Q-Angle at 90 degrees: normal  Patellar Grind: positive    Other   Sensation: normal    Muscle Strength   Right Lower Extremity   Quadriceps:  5/5   Hamstrin/5     Reflexes     Right Side   Quadriceps:  2+    Vascular Exam     Right Pulses  Dorsalis Pedis:      2+  Posterior Tibial:      2+            IMAGING:      MRI KNEE WITHOUT CONTRAST RIGHT     CLINICAL HISTORY:  RIGHT KNNE PAIN; Pain in right knee     TECHNIQUE:  Axial sagittal and coronal imaging of the knee is performed using T1, proton density, proton density fat-sat, STIR and gradient sequences.     COMPARISON:  2023     FINDINGS:  The anterior and posterior cruciate ligaments are intact without evidence of tear.     There is increased signal involving the anterior horn body and posterior horn of the medial meniscus.  The meniscus is partially extruded from the joint.  The signal does not definitely extend articular surface.  Small amount of artifact in the joint.  There is loss of articular cartilage with small osteophytes and subchondral changes more prominent on the tibial side of the knee.     Lateral meniscus appears within normal limits.     The medial and lateral collateral ligament complexes are intact without evidence of abnormality.     The extensor mechanism is intact and appears within normal limits.     No abnormal osseous of marrow signal or edema is seen. No focal cartilage defect is present.     There is Baker's cyst present posteriorly estimated 5 cm in length with a width up to 1.1 cm.  There is edema  tracking along the medial gastrocnemius  muscle which approximates the cyst.     Impression:     Increased signal majority of the medial meniscus that does not appear to extend to meniscal surface could indicate degeneration versus intrasubstance tear.  Mild-to-moderate medial compartment osteoarthrosis.  Baker's cyst with cyst leak present.         ASSESSMENT:      ICD-10-CM ICD-9-CM   1. Primary osteoarthritis of right knee  M17.11 715.16       PLAN:     -Findings and treatment options were discussed with the patient  -All questions answered  Natural history and expected course discussed. Questions answered.  Educational materials distributed.  She has fairly pronounced degenerative changes in the medial compartment with bony edema demonstrated.  I do not think that meniscus surgery in cartilage restoration surgery would be helpful in this case.  I think her best surgical treatment option would be right knee arthroplasty.    The conservative options including NSAIDs, activity modification, physical therapy, corticosteroid injection, and viscosupplimentation were discussed. She is interested in surgical intervention at this time.    The surgical process of knee replacement was discussed in detail with the patient including a detailed discussion of the procedure itself (including visual model, x-ray review, and literature review). The typical perioperative and post-operative course was discussed and perioperative risks were discussed to the patient's satisfaction.  Risks and complications discussed included but were not limited to the risks of anesthetic complications, infection, bleeding, wound healing complications, aseptic loosening, instability, limb length inequality, neurologic dysfunction including numbness,  DVT, pulmonary embolism, perioperative medical risks (cardiac, pulmonary, renal, neurologic), and death and the patient elects to proceed. We will initiate pre-operative medical evaluation and clearance and set a provisional date for surgical  intervention according to the patient's schedule.   I have discussed anticoagulation with aspirin and coumadin and in low risk patients I have recommended aspirin twice a day.  25 minutes was spent in direct consultation with the patient counselling her on the items listed above.      There are no Patient Instructions on file for this visit.      No orders of the defined types were placed in this encounter.        Procedures

## 2024-01-26 DIAGNOSIS — M17.11 PRIMARY OSTEOARTHRITIS OF RIGHT KNEE: Primary | ICD-10-CM

## 2024-01-26 DIAGNOSIS — Z01.818 PREPROCEDURAL EXAMINATION: Primary | ICD-10-CM

## 2024-01-26 RX ORDER — SODIUM CHLORIDE 9 MG/ML
INJECTION, SOLUTION INTRAVENOUS CONTINUOUS
Status: CANCELLED | OUTPATIENT
Start: 2024-01-26

## 2024-01-26 RX ORDER — TRANEXAMIC ACID 10 MG/ML
1000 INJECTION, SOLUTION INTRAVENOUS
Status: CANCELLED | OUTPATIENT
Start: 2024-01-26

## 2024-01-26 RX ORDER — MUPIROCIN 20 MG/G
OINTMENT TOPICAL
Status: CANCELLED | OUTPATIENT
Start: 2024-01-26

## 2024-01-27 ENCOUNTER — OFFICE VISIT (OUTPATIENT)
Dept: FAMILY MEDICINE | Facility: CLINIC | Age: 59
End: 2024-01-27
Payer: COMMERCIAL

## 2024-01-27 VITALS
RESPIRATION RATE: 20 BRPM | BODY MASS INDEX: 33.91 KG/M2 | HEART RATE: 90 BPM | TEMPERATURE: 99 F | WEIGHT: 211 LBS | OXYGEN SATURATION: 97 % | DIASTOLIC BLOOD PRESSURE: 90 MMHG | SYSTOLIC BLOOD PRESSURE: 148 MMHG | HEIGHT: 66 IN

## 2024-01-27 DIAGNOSIS — R05.9 COUGH, UNSPECIFIED TYPE: Primary | ICD-10-CM

## 2024-01-27 DIAGNOSIS — J01.90 ACUTE NON-RECURRENT SINUSITIS, UNSPECIFIED LOCATION: ICD-10-CM

## 2024-01-27 DIAGNOSIS — U07.1 COVID-19 VIRUS INFECTION: ICD-10-CM

## 2024-01-27 DIAGNOSIS — U07.1 COVID-19 VIRUS DETECTED: ICD-10-CM

## 2024-01-27 LAB
CTP QC/QA: YES
FLUAV AG NPH QL: NEGATIVE
FLUBV AG NPH QL: NEGATIVE
S PYO RRNA THROAT QL PROBE: NEGATIVE
SARS-COV-2 RDRP RESP QL NAA+PROBE: POSITIVE

## 2024-01-27 PROCEDURE — 3077F SYST BP >= 140 MM HG: CPT | Mod: ,,, | Performed by: FAMILY MEDICINE

## 2024-01-27 PROCEDURE — 99214 OFFICE O/P EST MOD 30 MIN: CPT | Mod: ,,, | Performed by: FAMILY MEDICINE

## 2024-01-27 PROCEDURE — 99051 MED SERV EVE/WKEND/HOLIDAY: CPT | Mod: ,,, | Performed by: FAMILY MEDICINE

## 2024-01-27 PROCEDURE — 3080F DIAST BP >= 90 MM HG: CPT | Mod: ,,, | Performed by: FAMILY MEDICINE

## 2024-01-27 PROCEDURE — 87804 INFLUENZA ASSAY W/OPTIC: CPT | Mod: 59,QW,, | Performed by: FAMILY MEDICINE

## 2024-01-27 PROCEDURE — 87635 SARS-COV-2 COVID-19 AMP PRB: CPT | Mod: QW,,, | Performed by: FAMILY MEDICINE

## 2024-01-27 PROCEDURE — 87880 STREP A ASSAY W/OPTIC: CPT | Mod: QW,,, | Performed by: FAMILY MEDICINE

## 2024-01-27 PROCEDURE — 3008F BODY MASS INDEX DOCD: CPT | Mod: ,,, | Performed by: FAMILY MEDICINE

## 2024-01-27 PROCEDURE — 1159F MED LIST DOCD IN RCRD: CPT | Mod: ,,, | Performed by: FAMILY MEDICINE

## 2024-01-27 RX ORDER — NIRMATRELVIR AND RITONAVIR 150-100 MG
KIT ORAL
Qty: 20 TABLET | Refills: 0 | Status: SHIPPED | OUTPATIENT
Start: 2024-01-27 | End: 2024-02-14

## 2024-01-27 RX ORDER — PREDNISONE 20 MG/1
TABLET ORAL
Qty: 10 TABLET | Refills: 0 | Status: SHIPPED | OUTPATIENT
Start: 2024-01-27 | End: 2024-02-14

## 2024-01-27 RX ORDER — AMOXICILLIN 875 MG/1
875 TABLET, FILM COATED ORAL EVERY 12 HOURS
Qty: 20 TABLET | Refills: 0 | Status: SHIPPED | OUTPATIENT
Start: 2024-01-27 | End: 2024-02-14

## 2024-01-27 NOTE — LETTER
January 27, 2024      [facility]  [address] [address]  Phone: [phone]  Fax: [phone]       Patient: Cary Carbone   YOB: 1965  Date of Visit: 01/27/2024    To Whom It May Concern:    Dona Carbone  was at [facility] on 01/27/2024. The patient may return to work/school on 02/01/2024 with no restrictions. If you have any questions or concerns, or if I can be of further assistance, please do not hesitate to contact me.    Sincerely,    Dr. Jasper Moreira

## 2024-01-27 NOTE — PROGRESS NOTES
Subjective     Patient ID: Cary Carbone is a 58 y.o. female.    Chief Complaint: Sinus Problem (Cough, runny nose, congestion)    Symptoms began almost 2 weeks ago.  No fever or body aches.  She has had some worsening sinus pressure over the last few days    Sinus Problem      Review of Systems       Objective     Physical Exam  Constitutional:       Appearance: She is ill-appearing (mildly.). She is not toxic-appearing.   HENT:      Right Ear: Tympanic membrane normal.      Left Ear: Tympanic membrane normal.      Nose: Congestion present.      Mouth/Throat:      Pharynx: No posterior oropharyngeal erythema.   Cardiovascular:      Rate and Rhythm: Normal rate and regular rhythm.   Pulmonary:      Effort: Pulmonary effort is normal.      Breath sounds: Normal breath sounds. No wheezing or rhonchi (Harsh barking cough.  Lungs are clear to auscultation though.).   Neurological:      Mental Status: She is alert.            Assessment and Plan     1. Cough, unspecified type  -     POCT Influenza A/B  -     POCT COVID-19 Rapid Screening  -     POCT rapid strep A    2. Acute non-recurrent sinusitis, unspecified location    3. COVID-19 virus infection    Other orders  -     predniSONE (DELTASONE) 20 MG tablet; Two every morning  Dispense: 10 tablet; Refill: 0  -     amoxicillin (AMOXIL) 875 MG tablet; Take 1 tablet (875 mg total) by mouth every 12 (twelve) hours.  Dispense: 20 tablet; Refill: 0  -     nirmatrelvir-ritonavir (PAXLOVID) 150-100 mg DsPk; Two tablets twice a day  Dispense: 20 tablet; Refill: 0        Go to emergency room for any severe shortness of breath         No follow-ups on file.

## 2024-02-09 DIAGNOSIS — I10 ESSENTIAL HYPERTENSION: ICD-10-CM

## 2024-02-09 RX ORDER — HYDROCHLOROTHIAZIDE 12.5 MG/1
12.5 TABLET ORAL
Qty: 90 TABLET | Refills: 3 | Status: SHIPPED | OUTPATIENT
Start: 2024-02-09

## 2024-02-14 ENCOUNTER — OFFICE VISIT (OUTPATIENT)
Dept: PRIMARY CARE CLINIC | Facility: CLINIC | Age: 59
End: 2024-02-14
Payer: COMMERCIAL

## 2024-02-14 ENCOUNTER — CLINICAL SUPPORT (OUTPATIENT)
Dept: CARDIOLOGY | Facility: CLINIC | Age: 59
End: 2024-02-14
Payer: COMMERCIAL

## 2024-02-14 ENCOUNTER — HOSPITAL ENCOUNTER (OUTPATIENT)
Dept: RADIOLOGY | Facility: HOSPITAL | Age: 59
Discharge: HOME | End: 2024-02-14
Attending: ORTHOPAEDIC SURGERY
Payer: COMMERCIAL

## 2024-02-14 VITALS
SYSTOLIC BLOOD PRESSURE: 134 MMHG | RESPIRATION RATE: 18 BRPM | DIASTOLIC BLOOD PRESSURE: 84 MMHG | OXYGEN SATURATION: 98 % | BODY MASS INDEX: 33.43 KG/M2 | HEART RATE: 80 BPM | WEIGHT: 208 LBS | TEMPERATURE: 98 F | HEIGHT: 66 IN

## 2024-02-14 DIAGNOSIS — I10 ESSENTIAL HYPERTENSION: ICD-10-CM

## 2024-02-14 DIAGNOSIS — Z13.220 SCREENING FOR LIPOID DISORDERS: ICD-10-CM

## 2024-02-14 DIAGNOSIS — Z01.818 PREPROCEDURAL EXAMINATION: ICD-10-CM

## 2024-02-14 DIAGNOSIS — Z01.818 PRE-PROCEDURAL EXAMINATION: ICD-10-CM

## 2024-02-14 DIAGNOSIS — Z00.00 ROUTINE GENERAL MEDICAL EXAMINATION AT A HEALTH CARE FACILITY: Primary | ICD-10-CM

## 2024-02-14 DIAGNOSIS — Z13.1 SCREENING FOR DIABETES MELLITUS: ICD-10-CM

## 2024-02-14 PROCEDURE — 99396 PREV VISIT EST AGE 40-64: CPT | Mod: ,,, | Performed by: NURSE PRACTITIONER

## 2024-02-14 PROCEDURE — 99212 OFFICE O/P EST SF 10 MIN: CPT | Mod: PBBFAC,25

## 2024-02-14 PROCEDURE — 1159F MED LIST DOCD IN RCRD: CPT | Mod: ,,, | Performed by: NURSE PRACTITIONER

## 2024-02-14 PROCEDURE — 1160F RVW MEDS BY RX/DR IN RCRD: CPT | Mod: ,,, | Performed by: NURSE PRACTITIONER

## 2024-02-14 PROCEDURE — 71046 X-RAY EXAM CHEST 2 VIEWS: CPT | Mod: TC

## 2024-02-14 PROCEDURE — 71046 X-RAY EXAM CHEST 2 VIEWS: CPT | Mod: 26,,, | Performed by: RADIOLOGY

## 2024-02-14 PROCEDURE — 93010 ELECTROCARDIOGRAM REPORT: CPT | Mod: S$PBB,,, | Performed by: STUDENT IN AN ORGANIZED HEALTH CARE EDUCATION/TRAINING PROGRAM

## 2024-02-14 PROCEDURE — 85730 THROMBOPLASTIN TIME PARTIAL: CPT | Performed by: ORTHOPAEDIC SURGERY

## 2024-02-14 PROCEDURE — 3075F SYST BP GE 130 - 139MM HG: CPT | Mod: ,,, | Performed by: NURSE PRACTITIONER

## 2024-02-14 PROCEDURE — 85610 PROTHROMBIN TIME: CPT | Performed by: ORTHOPAEDIC SURGERY

## 2024-02-14 PROCEDURE — 3079F DIAST BP 80-89 MM HG: CPT | Mod: ,,, | Performed by: NURSE PRACTITIONER

## 2024-02-14 PROCEDURE — 93005 ELECTROCARDIOGRAM TRACING: CPT | Mod: PBBFAC | Performed by: STUDENT IN AN ORGANIZED HEALTH CARE EDUCATION/TRAINING PROGRAM

## 2024-02-14 PROCEDURE — 3008F BODY MASS INDEX DOCD: CPT | Mod: ,,, | Performed by: NURSE PRACTITIONER

## 2024-02-14 NOTE — PROGRESS NOTES
Subjective     Patient ID: Cary Carbone is a 58 y.o. female.    Chief Complaint: Healthy You    Pt presents for a healthy you visit and surgery clearance for right knee replacement with Dr. Hilton on 2/21/2024. Pt denies problems with anesthesia in the past and labs are pending.       Review of Systems   Constitutional:  Negative for activity change, appetite change, chills, fatigue and fever.   HENT:  Negative for nasal congestion, ear discharge, nosebleeds, postnasal drip, rhinorrhea, sinus pressure/congestion, sneezing, sore throat and tinnitus.    Eyes:  Negative for pain, discharge, redness and itching.   Respiratory:  Negative for cough, choking, chest tightness, shortness of breath and wheezing.    Cardiovascular:  Negative for chest pain.   Gastrointestinal:  Negative for abdominal distention, abdominal pain, blood in stool, change in bowel habit, constipation, diarrhea, nausea and vomiting.   Genitourinary:  Negative for decreased urine volume, dysuria, flank pain and frequency.   Musculoskeletal:  Negative for back pain and gait problem.   Integumentary:  Negative for wound, breast mass and breast discharge.   Allergic/Immunologic: Negative for immunocompromised state.   Neurological:  Negative for dizziness, light-headedness and headaches.   Psychiatric/Behavioral:  Negative for agitation, behavioral problems and hallucinations.    Breast: Negative for mass         Objective     Physical Exam  Vitals and nursing note reviewed.   Constitutional:       Appearance: Normal appearance.   Cardiovascular:      Rate and Rhythm: Normal rate and regular rhythm.      Heart sounds: Normal heart sounds.   Pulmonary:      Effort: Pulmonary effort is normal.      Breath sounds: Normal breath sounds.   Musculoskeletal:         General: Normal range of motion.   Neurological:      Mental Status: She is alert and oriented to person, place, and time.   Psychiatric:         Behavior: Behavior normal.            Assessment  and Plan     1. Routine general medical examination at a health care facility    2. Screening for diabetes mellitus  -     Glucose, Random; Future; Expected date: 02/14/2024    3. Screening for lipoid disorders  -     Lipid Panel; Future; Expected date: 02/14/2024    4. BMI 33.0-33.9,adult    5. Essential hypertension  -     TSH; Future; Expected date: 02/14/2024    6. Pre-procedural examination        Will call pt with lab results. No obvious reasons pt can not have surgery.          Follow up in about 6 months (around 8/14/2024).

## 2024-02-14 NOTE — PATIENT INSTRUCTIONS
"Patient Education       Diet and Health   The Basics   Written by the doctors and editors at [facility]   Why is it important to eat a healthy diet? -- It's important to eat a healthy diet because eating the right foods can keep you healthy now and later on in life.  Which foods are especially healthy? -- Foods that are especially healthy include:  Fruits and vegetables - Eating a diet with lots of fruits and vegetables can help prevent heart disease and strokes. It might also help prevent certain types of cancers. Try to eat fruits and vegetables at each meal and also for snacks. If you don't have fresh fruits and vegetables available, you can eat frozen or canned ones instead. Doctors recommend eating at least 2 1/2 servings of vegetables and 2 servings of fruits each day.  Foods with fiber - Eating foods with a lot of fiber can help prevent heart disease and strokes. If you have type 2 diabetes, it can also help control your blood sugar. Foods that have a lot of fiber include vegetables, fruits, beans, nuts, oatmeal, and whole grain breads and cereals. You can tell how much fiber is in a food by reading the nutrition label (figure 1). Doctors recommend eating 25 to 36 grams of fiber each day.  Foods with folate (also called folic acid) - Folate is a vitamin that is important for pregnant people, since it helps prevent certain birth defects. Anyone who could get pregnant should get at least 400 micrograms of folic acid daily, whether or not they are actively trying to get pregnant. Folate is found in many breakfast cereals, oranges, orange juice, and green leafy vegetables.  Foods with calcium and vitamin D - Babies, children, and adults need calcium and vitamin D to help keep their bones strong. Adults also need calcium and vitamin D to help prevent osteoporosis. Osteoporosis is a condition that causes bones to get "thin" and break more easily than usual. Different foods and drinks have calcium and vitamin D in " "them (figure 2). People who don't get enough calcium and vitamin D in their diet might need to take a supplement.  Foods with protein - Protein helps your muscles stay strong. Healthy foods with a lot of protein include chicken, fish, eggs, beans, nuts, and soy products. Red meat also has a lot of protein, but it also contains fats, which can be unhealthy.  Some experts recommend a "Mediterranean diet." This involves eating a lot of fruits, vegetables, nuts, whole grains, and olive oil. It also includes some fish, poultry, and dairy products, but not a lot of red meat. Eating this way can help your overall health, and might even lower your risk of having a stroke.  What foods should I avoid or limit? -- To eat a healthy diet, there are some things you should avoid or limit. They include:   Fats - There are different types of fats. Some types of fats are better for your body than others.  Trans fats are especially unhealthy. They are found in margarines, many fast foods, and some store-bought baked goods. Trans fats can raise your cholesterol level and your increase your chance of getting heart disease. You should avoid eating foods with these types of fats.  The type of "polyunsaturated" fats found in fish seems to be healthy and can reduce your chance of getting heart disease. Other polyunsaturated fats might also be good for your health. When you cook, it's best to use oils with healthier fats, such as olive oil and canola oil.  Sugar - To have a healthy diet, it's important to limit or avoid sugar, sweets, and refined grains. Refined grains are found in white bread, white rice, most forms of pasta, and most packaged "snack" foods. Whole grains, such as whole-wheat bread and brown rice, have more fiber and are better for your health.  Avoiding sugar-sweetened beverages, like soda and sports drinks, can also help improve your health.  Red meat - Studies have shown that eating a lot of red meat can increase your " "risk of certain health problems, including heart disease and cancer. You should limit the amount of red meat that you eat.  Can I drink alcohol as part of a healthy diet? -- People who drink a small amount of alcohol each day might have a lower chance of getting heart disease. But drinking alcohol can lead to problems. For example, it can raise a person's chances of getting liver disease and certain types of cancers. In women, even 1 drink a day can increase the risk of getting breast cancer.  Most doctors recommend that adult women not have more than 1 drink a day and that adult men not have more than 2 drinks a day. The limits are different because most women's bodies take longer to break down alcohol.  How many calories do I need each day? -- The number of calories you need each day depends on your weight, height, age, sex, and how active you are.  Your doctor or nurse can tell you how many calories you should eat each day. If you are trying to lose weight, you should eat fewer calories each day.  What if I have questions? -- If you have questions about which foods you should or should not eat, ask your doctor or nurse. The right diet for you will depend, in part, on your health and any medical conditions you have.  All topics are updated as new evidence becomes available and our peer review process is complete.  This topic retrieved from [employer] on: Sep 21, 2021.  Topic 70368 Version 20.0  Release: 29.4.2 - C29.263  © 2021 UpToDate, Inc. and/or its affiliates. All rights reserved.  figure 1: Nutrition label - fiber     This is an example of a nutrition label. To figure out how much fiber is in a food, look for the line that says "Dietary Fiber." It's also important to look at the serving size. This food has 7 grams of fiber in each serving, and each serving is 1 cup.  Graphic 05355 Version 7.0    figure 2: Foods and drinks with calcium and vitamin D     Foods rich in calcium include ice cream, soy milk, breads, " "kale, broccoli, milk, cheese, cottage cheese, almonds, yogurt, ready-to-eat cereals, beans, and tofu. Foods rich in vitamin D include milk, fortified plant-based "milks" (soy, almond), canned tuna fish, cod liver oil, yogurt, ready-to-eat-cereals, cooked salmon, canned sardines, mackerel, and eggs. Some of these foods are rich in both.  Graphic 06501 Version 4.0    Consumer Information Use and Disclaimer   This information is not specific medical advice and does not replace information you receive from your health care provider. This is only a brief summary of general information. It does NOT include all information about conditions, illnesses, injuries, tests, procedures, treatments, therapies, discharge instructions or life-style choices that may apply to you. You must talk with your health care provider for complete information about your health and treatment options. This information should not be used to decide whether or not to accept your health care provider's advice, instructions or recommendations. Only your health care provider has the knowledge and training to provide advice that is right for you. The use of this information is governed by the [employer] End User License Agreement, available at https://www.[employer]/en/solutions/Rdio/about/amaury.The use of [employer] content is governed by the [employer] Terms of Use. ©2021 UpToDate, Inc. All rights reserved.  Copyright   © 2021 UpToDate, Inc. and/or its affiliates. All rights reserved.    "

## 2024-02-21 ENCOUNTER — ANESTHESIA EVENT (OUTPATIENT)
Dept: SURGERY | Facility: HOSPITAL | Age: 59
End: 2024-02-21
Payer: COMMERCIAL

## 2024-02-21 ENCOUNTER — ANESTHESIA (OUTPATIENT)
Dept: SURGERY | Facility: HOSPITAL | Age: 59
End: 2024-02-21
Payer: COMMERCIAL

## 2024-02-21 ENCOUNTER — HOSPITAL ENCOUNTER (OUTPATIENT)
Facility: HOSPITAL | Age: 59
Discharge: HOME | End: 2024-02-21
Attending: ORTHOPAEDIC SURGERY | Admitting: ORTHOPAEDIC SURGERY
Payer: COMMERCIAL

## 2024-02-21 VITALS
HEART RATE: 86 BPM | OXYGEN SATURATION: 96 % | RESPIRATION RATE: 20 BRPM | SYSTOLIC BLOOD PRESSURE: 99 MMHG | DIASTOLIC BLOOD PRESSURE: 43 MMHG

## 2024-02-21 VITALS
HEART RATE: 72 BPM | RESPIRATION RATE: 16 BRPM | TEMPERATURE: 98 F | DIASTOLIC BLOOD PRESSURE: 69 MMHG | SYSTOLIC BLOOD PRESSURE: 129 MMHG | OXYGEN SATURATION: 91 %

## 2024-02-21 DIAGNOSIS — M17.11 PRIMARY OSTEOARTHRITIS OF RIGHT KNEE: Primary | ICD-10-CM

## 2024-02-21 LAB
INDIRECT COOMBS: NORMAL
RH BLD: NORMAL
SPECIMEN OUTDATE: NORMAL

## 2024-02-21 PROCEDURE — 71000016 HC POSTOP RECOV ADDL HR: Performed by: ORTHOPAEDIC SURGERY

## 2024-02-21 PROCEDURE — 25000003 PHARM REV CODE 250: Performed by: ORTHOPAEDIC SURGERY

## 2024-02-21 PROCEDURE — 27000510 HC BLANKET BAIR HUGGER ANY SIZE: Performed by: NURSE ANESTHETIST, CERTIFIED REGISTERED

## 2024-02-21 PROCEDURE — C1769 GUIDE WIRE: HCPCS | Performed by: ORTHOPAEDIC SURGERY

## 2024-02-21 PROCEDURE — 27000177 HC AIRWAY, LARYNGEAL MASK: Performed by: NURSE ANESTHETIST, CERTIFIED REGISTERED

## 2024-02-21 PROCEDURE — 36000712 HC OR TIME LEV V 1ST 15 MIN: Performed by: ORTHOPAEDIC SURGERY

## 2024-02-21 PROCEDURE — C1713 ANCHOR/SCREW BN/BN,TIS/BN: HCPCS | Performed by: ORTHOPAEDIC SURGERY

## 2024-02-21 PROCEDURE — 63600175 PHARM REV CODE 636 W HCPCS: Performed by: ORTHOPAEDIC SURGERY

## 2024-02-21 PROCEDURE — 37000009 HC ANESTHESIA EA ADD 15 MINS: Performed by: ORTHOPAEDIC SURGERY

## 2024-02-21 PROCEDURE — 86901 BLOOD TYPING SEROLOGIC RH(D): CPT | Performed by: ORTHOPAEDIC SURGERY

## 2024-02-21 PROCEDURE — D9220A PRA ANESTHESIA: Mod: CRNA,,, | Performed by: NURSE ANESTHETIST, CERTIFIED REGISTERED

## 2024-02-21 PROCEDURE — 27201423 OPTIME MED/SURG SUP & DEVICES STERILE SUPPLY: Performed by: ORTHOPAEDIC SURGERY

## 2024-02-21 PROCEDURE — 63600175 PHARM REV CODE 636 W HCPCS: Performed by: NURSE ANESTHETIST, CERTIFIED REGISTERED

## 2024-02-21 PROCEDURE — C1776 JOINT DEVICE (IMPLANTABLE): HCPCS | Performed by: ORTHOPAEDIC SURGERY

## 2024-02-21 PROCEDURE — 27000716 HC OXISENSOR PROBE, ANY SIZE: Performed by: NURSE ANESTHETIST, CERTIFIED REGISTERED

## 2024-02-21 PROCEDURE — 0055T BONE SRGRY CMPTR CT/MRI IMAG: CPT | Mod: ,,, | Performed by: ORTHOPAEDIC SURGERY

## 2024-02-21 PROCEDURE — 97161 PT EVAL LOW COMPLEX 20 MIN: CPT

## 2024-02-21 PROCEDURE — 36000713 HC OR TIME LEV V EA ADD 15 MIN: Performed by: ORTHOPAEDIC SURGERY

## 2024-02-21 PROCEDURE — 63600175 PHARM REV CODE 636 W HCPCS: Mod: JZ,JG | Performed by: ANESTHESIOLOGY

## 2024-02-21 PROCEDURE — D9220A PRA ANESTHESIA: Mod: ANES,,, | Performed by: ANESTHESIOLOGY

## 2024-02-21 PROCEDURE — 71000015 HC POSTOP RECOV 1ST HR: Performed by: ORTHOPAEDIC SURGERY

## 2024-02-21 PROCEDURE — 63600175 PHARM REV CODE 636 W HCPCS: Performed by: ANESTHESIOLOGY

## 2024-02-21 PROCEDURE — 37000008 HC ANESTHESIA 1ST 15 MINUTES: Performed by: ORTHOPAEDIC SURGERY

## 2024-02-21 PROCEDURE — 27447 TOTAL KNEE ARTHROPLASTY: CPT | Mod: RT,,, | Performed by: ORTHOPAEDIC SURGERY

## 2024-02-21 PROCEDURE — 71000033 HC RECOVERY, INTIAL HOUR: Performed by: ORTHOPAEDIC SURGERY

## 2024-02-21 PROCEDURE — 97165 OT EVAL LOW COMPLEX 30 MIN: CPT

## 2024-02-21 PROCEDURE — 25000003 PHARM REV CODE 250: Performed by: NURSE ANESTHETIST, CERTIFIED REGISTERED

## 2024-02-21 PROCEDURE — 64447 NJX AA&/STRD FEMORAL NRV IMG: CPT | Mod: 59,RT,, | Performed by: ANESTHESIOLOGY

## 2024-02-21 DEVICE — CEMENT BONE SPEED SET: Type: IMPLANTABLE DEVICE | Site: KNEE | Status: FUNCTIONAL

## 2024-02-21 DEVICE — ATTUNE PATELLA MEDIALIZED DOME 35MM CEMENTED AOX
Type: IMPLANTABLE DEVICE | Site: KNEE | Status: FUNCTIONAL
Brand: ATTUNE

## 2024-02-21 RX ORDER — PREGABALIN 75 MG/1
75 CAPSULE ORAL 2 TIMES DAILY
Status: DISCONTINUED | OUTPATIENT
Start: 2024-02-21 | End: 2024-02-21 | Stop reason: HOSPADM

## 2024-02-21 RX ORDER — DIPHENHYDRAMINE HYDROCHLORIDE 50 MG/ML
25 INJECTION INTRAMUSCULAR; INTRAVENOUS EVERY 6 HOURS PRN
Status: DISCONTINUED | OUTPATIENT
Start: 2024-02-21 | End: 2024-02-21 | Stop reason: HOSPADM

## 2024-02-21 RX ORDER — MIDAZOLAM HYDROCHLORIDE 1 MG/ML
INJECTION INTRAMUSCULAR; INTRAVENOUS
Status: DISCONTINUED | OUTPATIENT
Start: 2024-02-21 | End: 2024-02-21

## 2024-02-21 RX ORDER — CELECOXIB 100 MG/1
200 CAPSULE ORAL 2 TIMES DAILY
Status: DISCONTINUED | OUTPATIENT
Start: 2024-02-21 | End: 2024-02-21 | Stop reason: HOSPADM

## 2024-02-21 RX ORDER — MUPIROCIN 20 MG/G
OINTMENT TOPICAL
Status: DISCONTINUED | OUTPATIENT
Start: 2024-02-21 | End: 2024-02-21 | Stop reason: HOSPADM

## 2024-02-21 RX ORDER — GLYCOPYRROLATE 0.2 MG/ML
INJECTION INTRAMUSCULAR; INTRAVENOUS
Status: DISCONTINUED | OUTPATIENT
Start: 2024-02-21 | End: 2024-02-21

## 2024-02-21 RX ORDER — ONDANSETRON HYDROCHLORIDE 2 MG/ML
4 INJECTION, SOLUTION INTRAVENOUS DAILY PRN
Status: DISCONTINUED | OUTPATIENT
Start: 2024-02-21 | End: 2024-02-21 | Stop reason: HOSPADM

## 2024-02-21 RX ORDER — MEPERIDINE HYDROCHLORIDE 25 MG/ML
25 INJECTION INTRAMUSCULAR; INTRAVENOUS; SUBCUTANEOUS EVERY 10 MIN PRN
Status: DISCONTINUED | OUTPATIENT
Start: 2024-02-21 | End: 2024-02-21 | Stop reason: HOSPADM

## 2024-02-21 RX ORDER — SODIUM CHLORIDE 0.9 % (FLUSH) 0.9 %
3 SYRINGE (ML) INJECTION EVERY 6 HOURS PRN
Status: DISCONTINUED | OUTPATIENT
Start: 2024-02-21 | End: 2024-02-21 | Stop reason: HOSPADM

## 2024-02-21 RX ORDER — POLYETHYLENE GLYCOL 3350 17 G/17G
17 POWDER, FOR SOLUTION ORAL DAILY
Status: DISCONTINUED | OUTPATIENT
Start: 2024-02-21 | End: 2024-02-21 | Stop reason: HOSPADM

## 2024-02-21 RX ORDER — MUPIROCIN 20 MG/G
1 OINTMENT TOPICAL 2 TIMES DAILY
Status: DISCONTINUED | OUTPATIENT
Start: 2024-02-21 | End: 2024-02-21 | Stop reason: HOSPADM

## 2024-02-21 RX ORDER — CELECOXIB 200 MG/1
200 CAPSULE ORAL 2 TIMES DAILY
Qty: 60 CAPSULE | Refills: 0 | Status: SHIPPED | OUTPATIENT
Start: 2024-02-21 | End: 2024-04-09

## 2024-02-21 RX ORDER — ROPIVACAINE HYDROCHLORIDE 7.5 MG/ML
INJECTION, SOLUTION EPIDURAL; PERINEURAL
Status: COMPLETED | OUTPATIENT
Start: 2024-02-21 | End: 2024-02-21

## 2024-02-21 RX ORDER — CEFAZOLIN SODIUM 1 G/3ML
INJECTION, POWDER, FOR SOLUTION INTRAMUSCULAR; INTRAVENOUS
Status: DISCONTINUED | OUTPATIENT
Start: 2024-02-21 | End: 2024-02-21

## 2024-02-21 RX ORDER — DOCUSATE SODIUM 100 MG/1
100 CAPSULE, LIQUID FILLED ORAL EVERY 12 HOURS
Status: DISCONTINUED | OUTPATIENT
Start: 2024-02-21 | End: 2024-02-21 | Stop reason: HOSPADM

## 2024-02-21 RX ORDER — ONDANSETRON 4 MG/1
8 TABLET, ORALLY DISINTEGRATING ORAL EVERY 8 HOURS PRN
Qty: 30 TABLET | Refills: 0 | Status: SHIPPED | OUTPATIENT
Start: 2024-02-21 | End: 2024-03-06 | Stop reason: SDUPTHER

## 2024-02-21 RX ORDER — ASPIRIN 81 MG/1
81 TABLET ORAL 2 TIMES DAILY
Qty: 60 TABLET | Refills: 0 | Status: SHIPPED | OUTPATIENT
Start: 2024-02-21 | End: 2024-04-09

## 2024-02-21 RX ORDER — ACETAMINOPHEN 500 MG
1000 TABLET ORAL EVERY 8 HOURS
Status: DISCONTINUED | OUTPATIENT
Start: 2024-02-21 | End: 2024-02-21 | Stop reason: HOSPADM

## 2024-02-21 RX ORDER — AMOXICILLIN 250 MG
1 CAPSULE ORAL 2 TIMES DAILY
Qty: 60 TABLET | Refills: 0 | Status: SHIPPED | OUTPATIENT
Start: 2024-02-21 | End: 2024-04-09

## 2024-02-21 RX ORDER — SODIUM CHLORIDE 9 MG/ML
INJECTION, SOLUTION INTRAVENOUS CONTINUOUS
Status: DISCONTINUED | OUTPATIENT
Start: 2024-02-21 | End: 2024-02-21 | Stop reason: HOSPADM

## 2024-02-21 RX ORDER — ONDANSETRON HYDROCHLORIDE 2 MG/ML
INJECTION, SOLUTION INTRAVENOUS
Status: DISCONTINUED | OUTPATIENT
Start: 2024-02-21 | End: 2024-02-21

## 2024-02-21 RX ORDER — ROPIVACAINE HYDROCHLORIDE 7.5 MG/ML
INJECTION, SOLUTION EPIDURAL; PERINEURAL
Status: DISCONTINUED | OUTPATIENT
Start: 2024-02-21 | End: 2024-02-21 | Stop reason: HOSPADM

## 2024-02-21 RX ORDER — OXYCODONE AND ACETAMINOPHEN 10; 325 MG/1; MG/1
1 TABLET ORAL EVERY 6 HOURS PRN
Qty: 35 TABLET | Refills: 0 | Status: SHIPPED | OUTPATIENT
Start: 2024-02-21 | End: 2024-03-06 | Stop reason: SDUPTHER

## 2024-02-21 RX ORDER — ONDANSETRON 4 MG/1
8 TABLET, ORALLY DISINTEGRATING ORAL EVERY 8 HOURS PRN
Status: DISCONTINUED | OUTPATIENT
Start: 2024-02-21 | End: 2024-02-21 | Stop reason: HOSPADM

## 2024-02-21 RX ORDER — OXYCODONE HYDROCHLORIDE 5 MG/1
10 TABLET ORAL EVERY 4 HOURS PRN
Status: DISCONTINUED | OUTPATIENT
Start: 2024-02-21 | End: 2024-02-21 | Stop reason: HOSPADM

## 2024-02-21 RX ORDER — FENTANYL CITRATE 50 UG/ML
INJECTION, SOLUTION INTRAMUSCULAR; INTRAVENOUS
Status: DISCONTINUED | OUTPATIENT
Start: 2024-02-21 | End: 2024-02-21

## 2024-02-21 RX ORDER — MORPHINE SULFATE 10 MG/ML
4 INJECTION INTRAMUSCULAR; INTRAVENOUS; SUBCUTANEOUS EVERY 5 MIN PRN
Status: DISCONTINUED | OUTPATIENT
Start: 2024-02-21 | End: 2024-02-21 | Stop reason: HOSPADM

## 2024-02-21 RX ORDER — BISACODYL 10 MG/1
10 SUPPOSITORY RECTAL DAILY PRN
Status: DISCONTINUED | OUTPATIENT
Start: 2024-02-21 | End: 2024-02-21 | Stop reason: HOSPADM

## 2024-02-21 RX ORDER — OXYCODONE HYDROCHLORIDE 5 MG/1
5 TABLET ORAL EVERY 4 HOURS PRN
Status: DISCONTINUED | OUTPATIENT
Start: 2024-02-21 | End: 2024-02-21 | Stop reason: HOSPADM

## 2024-02-21 RX ORDER — ASPIRIN 325 MG
325 TABLET ORAL DAILY
Status: DISCONTINUED | OUTPATIENT
Start: 2024-02-22 | End: 2024-02-21 | Stop reason: HOSPADM

## 2024-02-21 RX ORDER — LIDOCAINE HYDROCHLORIDE 20 MG/ML
INJECTION, SOLUTION EPIDURAL; INFILTRATION; INTRACAUDAL; PERINEURAL
Status: DISCONTINUED | OUTPATIENT
Start: 2024-02-21 | End: 2024-02-21

## 2024-02-21 RX ORDER — HYDROMORPHONE HYDROCHLORIDE 2 MG/ML
0.5 INJECTION, SOLUTION INTRAMUSCULAR; INTRAVENOUS; SUBCUTANEOUS EVERY 5 MIN PRN
Status: DISCONTINUED | OUTPATIENT
Start: 2024-02-21 | End: 2024-02-21 | Stop reason: HOSPADM

## 2024-02-21 RX ORDER — LOPERAMIDE HYDROCHLORIDE 2 MG/1
4 CAPSULE ORAL ONCE
Status: DISCONTINUED | OUTPATIENT
Start: 2024-02-21 | End: 2024-02-21 | Stop reason: HOSPADM

## 2024-02-21 RX ORDER — ZOLPIDEM TARTRATE 5 MG/1
5 TABLET ORAL NIGHTLY PRN
Status: DISCONTINUED | OUTPATIENT
Start: 2024-02-21 | End: 2024-02-21 | Stop reason: HOSPADM

## 2024-02-21 RX ORDER — FAMOTIDINE 20 MG/1
20 TABLET, FILM COATED ORAL 2 TIMES DAILY
Status: CANCELLED | OUTPATIENT
Start: 2024-02-21

## 2024-02-21 RX ORDER — PROPOFOL 10 MG/ML
VIAL (ML) INTRAVENOUS
Status: DISCONTINUED | OUTPATIENT
Start: 2024-02-21 | End: 2024-02-21

## 2024-02-21 RX ORDER — PREDNISONE 5 MG/1
5 TABLET ORAL DAILY
Qty: 21 TABLET | Refills: 0 | Status: SHIPPED | OUTPATIENT
Start: 2024-02-21 | End: 2024-03-13

## 2024-02-21 RX ORDER — PHENYLEPHRINE HYDROCHLORIDE 10 MG/ML
INJECTION INTRAVENOUS
Status: DISCONTINUED | OUTPATIENT
Start: 2024-02-21 | End: 2024-02-21

## 2024-02-21 RX ORDER — ACETAMINOPHEN 10 MG/ML
1000 INJECTION, SOLUTION INTRAVENOUS ONCE
Status: COMPLETED | OUTPATIENT
Start: 2024-02-21 | End: 2024-02-21

## 2024-02-21 RX ADMIN — PREGABALIN 75 MG: 75 CAPSULE ORAL at 12:02

## 2024-02-21 RX ADMIN — GLYCOPYRROLATE 0.2 MG: 0.2 INJECTION INTRAMUSCULAR; INTRAVENOUS at 08:02

## 2024-02-21 RX ADMIN — ONDANSETRON 8 MG: 4 TABLET, ORALLY DISINTEGRATING ORAL at 01:02

## 2024-02-21 RX ADMIN — VANCOMYCIN HYDROCHLORIDE 1500 MG: 1 INJECTION, POWDER, LYOPHILIZED, FOR SOLUTION INTRAVENOUS at 08:02

## 2024-02-21 RX ADMIN — FENTANYL CITRATE 50 MCG: 50 INJECTION INTRAMUSCULAR; INTRAVENOUS at 08:02

## 2024-02-21 RX ADMIN — ACETAMINOPHEN 1000 MG: 10 INJECTION, SOLUTION INTRAVENOUS at 11:02

## 2024-02-21 RX ADMIN — MORPHINE SULFATE 4 MG: 10 INJECTION, SOLUTION INTRAMUSCULAR; INTRAVENOUS at 11:02

## 2024-02-21 RX ADMIN — SODIUM CHLORIDE: 9 INJECTION, SOLUTION INTRAVENOUS at 10:02

## 2024-02-21 RX ADMIN — SODIUM CHLORIDE: 9 INJECTION, SOLUTION INTRAVENOUS at 07:02

## 2024-02-21 RX ADMIN — MIDAZOLAM 2 MG: 1 INJECTION INTRAMUSCULAR; INTRAVENOUS at 07:02

## 2024-02-21 RX ADMIN — TRANEXAMIC ACID 500 MG: 100 INJECTION INTRAVENOUS at 10:02

## 2024-02-21 RX ADMIN — FENTANYL CITRATE 50 MCG: 50 INJECTION INTRAMUSCULAR; INTRAVENOUS at 09:02

## 2024-02-21 RX ADMIN — ONDANSETRON 4 MG: 2 INJECTION INTRAMUSCULAR; INTRAVENOUS at 08:02

## 2024-02-21 RX ADMIN — SODIUM CHLORIDE: 9 INJECTION, SOLUTION INTRAVENOUS at 06:02

## 2024-02-21 RX ADMIN — CELECOXIB 200 MG: 100 CAPSULE ORAL at 12:02

## 2024-02-21 RX ADMIN — PHENYLEPHRINE HYDROCHLORIDE 100 MCG: 10 INJECTION INTRAVENOUS at 09:02

## 2024-02-21 RX ADMIN — PHENYLEPHRINE HYDROCHLORIDE 100 MCG: 10 INJECTION INTRAVENOUS at 10:02

## 2024-02-21 RX ADMIN — TRANEXAMIC ACID 1000 MG: 100 INJECTION INTRAVENOUS at 08:02

## 2024-02-21 RX ADMIN — LIDOCAINE HYDROCHLORIDE 60 MG: 20 INJECTION, SOLUTION INTRAVENOUS at 08:02

## 2024-02-21 RX ADMIN — CEFAZOLIN 2 G: 1 INJECTION, POWDER, FOR SOLUTION INTRAMUSCULAR; INTRAVENOUS; PARENTERAL at 08:02

## 2024-02-21 RX ADMIN — ROPIVACAINE HYDROCHLORIDE 40 ML: 7.5 INJECTION, SOLUTION EPIDURAL; PERINEURAL at 08:02

## 2024-02-21 RX ADMIN — PROPOFOL 180 MG: 10 INJECTION, EMULSION INTRAVENOUS at 08:02

## 2024-02-21 RX ADMIN — OXYCODONE 10 MG: 5 TABLET ORAL at 12:02

## 2024-02-21 NOTE — PT/OT/SLP EVAL
"Physical Therapy Evaluation and Discharge Note    Patient Name:  Cary Carbone   MRN:  [id number]    Recommendations:     Discharge Recommendations: Low Intensity Therapy  Discharge Equipment Recommendations: walker, rolling   Barriers to discharge: None    Assessment:     Cary Carbone is a 58 y.o. female admitted with a medical diagnosis of Primary osteoarthritis of right knee.      Patient and family demonstrate competence with post-op care for planned d/c home today. Anticipate follow up by home health progressing to OPPT when patient ready.    Recent Surgery: Procedure(s) (LRB):  ROBOTIC ARTHROPLASTY, KNEE, TOTAL (Right) Day of Surgery    Plan:     During this hospitalization, patient does not require further acute PT services.  Please re-consult if situation changes.      Subjective     Chief Complaint: Primary osteoarthritis of right knee   Patient/Family Comments/goals: "I hurt pretty bad." Patient with less than effective adductor canal block  Pain/Comfort:  Pain Rating 1: 7/10  Location - Side 1: Right  Location 1: knee  Pain Addressed 1: Pre-medicate for activity, Reposition, Distraction, Cessation of Activity  Pain Rating Post-Intervention 1: 7/10    Patients cultural, spiritual, Yarsani conflicts given the current situation: no    Living Environment:  Pt lives with spouse in a single level home with one shallow step to enter. Multiple family members will be assisting patient  Prior to admission, patients level of function was independent. Patient is a manager of a convenience store.  Equipment used at home: none.  DME owned (not currently used): none.  Upon discharge, patient will have assistance from family and home health PT.    Objective:     Communicated with Samantha Honeycutt RN prior to session.  Patient found supine with blood pressure cuff, pulse ox (continuous), knee immobilizer, peripheral IV upon PT entry to room.    General Precautions: Standard, fall    Orthopedic Precautions:RLE weight " "bearing as tolerated   Braces: Knee immobilizer  Respiratory Status: Room air    Exams:  Cognitive Exam:  Patient is oriented to Person, Place, Time, and Situation  Sensation:    -       Intact  RLE ROM: Deficits: hip WFL; knee in KI; ankle WFL  RLE Strength: Deficits: hip WFL; knee in KI; ankle WFL  LLE ROM: WFL  LLE Strength: 5/5    Functional Mobility:  Bed Mobility:     Supine to Sit: minimum assistance  Sit to Supine: minimum assistance  Transfers:     Sit to Stand:  contact guard assistance with rolling walker and high surface- surgery stretcher  Gait: 80' using RW/KI; CGA; step to pattern, slow canelo, several standing rest breaks due to c/o nausea but no acute emesis; no LOB. Patient reports confidence with planned d/c home later today    AM-PAC 6 CLICK MOBILITY  Total Score:18       Treatment and Education:  Review of illustrated HEP to be found on AVS  Review of knee immobilizer rationale and schedule  Home health to follow up    AM-PAC 6 CLICK MOBILITY  Total Score:18     Patient left HOB elevated with all lines intact, call button in reach,  Samantha Honeycutt RN notified, and family present.    GOALS:   Multidisciplinary Problems       Physical Therapy Goals       Not on file              Multidisciplinary Problems (Resolved)          Problem: Physical Therapy    Goal Priority Disciplines Outcome Goal Variances Interventions   Physical Therapy Goal   (Resolved)     PT, PT/OT Met     Description: To facilitate d/c home today patient will demonstrate:  1. Min A supine to sit t/f  2. CGA sit to stand t/f using RW  3. CGA gait x 80' using RW and KI  4. Verbalize understanding of illustrated HEP  5. Verbalize understanding of rationale for knee immobilizer every night x 2 weeks per Dr Hilton protocol    Long term goal:  Patient will regain full independent functional mobility without AD to return to prior ADLs and work tasks."                         History:     Past Medical History:   Diagnosis Date    Elevated " blood pressure reading without diagnosis of hypertension 2021    Essential hypertension 2021    Hypertension 2023       Past Surgical History:   Procedure Laterality Date     SECTION      COLONOSCOPY      over 10 years ago per patient @ [facility] 2021    [place]  2022    Results were negative per AMANDA Duran- recommended repeat in 3 yrs    TUBAL LIGATION         Time Tracking:     PT Received On: 24  PT Start Time: 1313     PT Stop Time: 1333  PT Total Time (min): 20 min     Billable Minutes: Evaluation Low complexity      2024

## 2024-02-21 NOTE — PLAN OF CARE
[facility]  Initial Discharge Assessment       Primary Care Provider: Delores Bauer FNP    Admission Diagnosis: Primary osteoarthritis of right knee [M17.11]    Admission Date: 2/21/2024  Expected Discharge Date: 2/21/2024    Transition of Care Barriers: None    Payor: BLUE CROSS BLUE SHIELD / Plan: BCBS ALL OUT OF STATE / Product Type: PPO /     Extended Emergency Contact Information  Primary Emergency Contact: GARFIELD SHIN  Address: [address] [place] [address]  Home Phone: [phone]  Work Phone: [phone]  Mobile Phone: [phone]  Relation: Spouse  Preferred language: English   needed? No    Discharge Plan A: Home with family, Home Health  Discharge Plan B: Home with family, Home Health      [facility] [place] - [address]  [address] [facility] [address]  Phone: [phone] Fax: [phone]    [facility] [place] [place] - [facility] AT Portal to Registered [facility] Sites  [facility]  [place] [address]  Phone: [phone] Fax: [phone]    [facility] [place] - [address] [facility]  [address] [facility] [address]  Phone: [phone] Fax: [phone]    [facility]/pharmacy #[id number] - [place], [place] - [address]  [address] [place] [facility]  [address]   [facility] [address]  Phone: [phone] Fax: [phone]    [facility], [place] - [address]  [address]  [place] [address]  Phone: [phone] Fax: [phone]      Initial Assessment (most recent)       Adult Discharge Assessment - 02/21/24 1400          Discharge Assessment    Assessment Type Discharge Planning Assessment     Source of Information patient;family     People in Home spouse     Do you expect to return to your current living situation? Yes     Do you have help at home or someone to help you manage your  care at home? Yes     Who are your caregiver(s) and their phone number(s)? Shubham Carbone- Spouse [phone]     Prior to hospitilization cognitive status: Risk of Harm to Self/Others     Current cognitive status: Alert/Oriented     Walking or Climbing Stairs Difficulty no     Dressing/Bathing Difficulty no     Home Accessibility stairs to enter home     Number of Stairs, Main Entrance one     Equipment Currently Used at Home none     Readmission within 30 days? No     Patient currently being followed by outpatient case management? No     Do you currently have service(s) that help you manage your care at home? No     Do you take prescription medications? Yes     Do you have prescription coverage? Yes     Coverage BCBS OOS     Do you have any problems affording any of your prescribed medications? No     Is the patient taking medications as prescribed? yes     Who is going to help you get home at discharge? Spouse     How do you get to doctors appointments? car, drives self     Are you on dialysis? No     Do you take coumadin? No     Discharge Plan A Home with family;Home Health     Discharge Plan B Home with family;Home Health     DME Needed Upon Discharge  walker, rolling     Discharge Plan discussed with: Patient;Spouse/sig other     Name(s) and Number(s) Raf Jalloh- Spouse     Transition of Care Barriers None                   [facility]  Discharge Final Note    Primary Care Provider: Delores Bauer FNP    Expected Discharge Date: 2/21/2024    Final Discharge Note (most recent)       Final Note - 02/21/24 1402          Final Note    Assessment Type Final Discharge Note     Anticipated Discharge Disposition Home-Health Care [place]     What phone number can be called within the next 1-3 days to see how you are doing after discharge? [phone]        Post-Acute Status    Post-Acute Authorization HME;Home Health     HME Status Set-up Complete/Auth obtained     Home Health Status  Set-up Complete/Auth obtained     Patient choice form signed by patient/caregiver List with quality metrics by geographic area provided;List from CMS Compare     Discharge Delays None known at this time                       After-discharge care                Durable Medical Equipment       [facility]   Service: Durable Medical Equipment    [address] [place] [address] [place] [address]   Phone: [phone]                 Home Medical Care       [facility]   Service: Home Rehabilitation    [address] [facility] [place] [address]   Phone: [phone]                        MARLENE spoke with pt and spouse, Shubham at bedside. Pt lives at home with spouse, not current with hh and uses no dme. Pt will need a rw from [facility] and plans to dc home today with [facility] hh, choice obtained. MARLENE faxed referral to [facility] for rw and informed Nida of dc today. RW to be delivered to pts room. MARLENE faxed hh referral to [facility] and informed Olivia of dc today. No other needs.

## 2024-02-21 NOTE — PLAN OF CARE
"  Problem: Physical Therapy  Goal: Physical Therapy Goal  Description: To facilitate d/c home today patient will demonstrate:  1. Min A supine to sit t/f  2. CGA sit to stand t/f using RW  3. CGA gait x 80' using RW and KI  4. Verbalize understanding of illustrated HEP  5. Verbalize understanding of rationale for knee immobilizer every night x 2 weeks per Dr Hilton protocol    Long term goal:  Patient will regain full independent functional mobility without AD to return to prior ADLs and work tasks."    Outcome: Met     Patient and family demonstrate competence with post-op care for planned d/c home today. Anticipate follow up by home health progressing to OPPT when patient ready.  "

## 2024-02-21 NOTE — PLAN OF CARE
Problem: Occupational Therapy  Goal: Occupational Therapy Goal  Description: ST.Pt will perform bathing with Juliana with setup at EOB  2.Pt will perform UE dressing with Karla  3.Pt will perform LE dressing with Juliana  4.Pt will transfer bed/chair/bsc with CGA with RW  5.Pt will perform standing task x 2 min with CGA with RW  6.Tolerate 15 min of tx without fatigue.      LTG:   Restore to max I with selfcare and mobility.      Outcome: Ongoing, Progressing

## 2024-02-21 NOTE — ANESTHESIA PREPROCEDURE EVALUATION
02/21/2024  Cary Carbone is a 58 y.o., female.      Pre-op Assessment    I have reviewed the Patient Summary Reports.    I have reviewed the NPO Status.   I have reviewed the Medications.     Review of Systems         Anesthesia Plan  Type of Anesthesia, risks & benefits discussed:    Anesthesia Type: Gen Supraglottic Airway, Regional  Intra-op Monitoring Plan: Standard ASA Monitors  Post Op Pain Control Plan: multimodal analgesia  Informed Consent: Informed consent signed with the Patient and all parties understand the risks and agree with anesthesia plan.  All questions answered.   ASA Score: 3    Ready For Surgery From Anesthesia Perspective.     .  NPO greater than 8 hours  No anesthetic complications  NKDA    Hct 41    HTN  Sleep apnea  GERD    Airway exam deferred (COVID precautions); adequate ROM at neck.    Plan is general plus adductor canal block

## 2024-02-21 NOTE — TRANSFER OF CARE
Anesthesia Transfer of Care Note    Patient: Cary Carbone    Procedure(s) Performed: Procedure(s) (LRB):  ROBOTIC ARTHROPLASTY, KNEE, TOTAL (Right)    Patient location: PACU    Anesthesia Type: general    Transport from OR: Transported from OR on 6-10 L/min O2 by face mask with adequate spontaneous ventilation    Post pain: adequate analgesia    Post assessment: no apparent anesthetic complications    Post vital signs: stable    Level of consciousness: awake, alert and oriented    Nausea/Vomiting: no nausea/vomiting    Complications: none    Transfer of care protocol was followedComments: Good SV continue, NAD noted, VSS, RTRN      Last vitals: Visit Vitals  BP (!) 161/96 (BP Location: Right arm, Patient Position: Lying)   Pulse 108   Temp 36.8 °C (98.2 °F) (Oral)   Resp (!) 21   LMP  (LMP Unknown)   SpO2 100%   Breastfeeding No

## 2024-02-21 NOTE — ANESTHESIA PROCEDURE NOTES
LMA    Date/Time: 2/21/2024 8:03 AM    Performed by: Patricio Carter CRNA  Authorized by: Checo Wilson MD    Intubation:     Induction:  Intravenous    Intubated:  Postinduction    Mask Ventilation:  Easy mask    Attempts:  1    Attempted By:  CRNA    Difficult Airway Encountered?: No      Complications:  None    Airway Device:  Supraglottic airway/LMA    Airway Device Size:  4.0    Style/Cuff Inflation:  Cuffed    Endotracheal tube placement: TO SEAL.    Placement Verified By:  Capnometry    Complicating Factors:  Obesity and short neck    Findings Post-Intubation:  BS equal bilateral and atraumatic/condition of teeth unchanged  Notes:      SMOOTH, TOLERATED WELL

## 2024-02-21 NOTE — DISCHARGE INSTRUCTIONS
ANKLE: Pumps        Point toes down, then up. Repeat 30 times, 2 sessions per day        Quad Set        With other leg bent, foot flat, slowly tighten muscles on right thigh of straight leg while counting out loud to 5.  Repeat 30 times, 2 sessions per day.          Hip Abduction / Adduction: with Extended Knee (Supine)        Bring right leg out to side and return. Keep knee straight.  Repeat 30 times, 2 sessions per day.         HIP / KNEE: Flexion, Heel Slides - Supine        Slide right heel up toward buttocks, keeping leg in straight line. Repeat 30 times, 2 sessions per day.  Use towel or pillowcase under heel as needed.       Straight Leg Raise        Bend left leg. Raise right leg 8-12 inches with knee locked. Exhale and tighten thigh muscles while raising leg.   Repeat 30 times, 2 sessions per day.           KNEE: Extension, Long Arc Quads - Sitting        Raise right leg until knee is straight.  Repeat 30 times, 2 sessions per day        KNEE: Flexion / Extension - Sitting        Sit at edge of surface, foot on towel or pillowcase. Bend and straighten right knee.  Repeat 30 times, 2 sessions per day.   Use opposite leg to increase knee flexion.    Copyright © [employer]. All rights reserved.   *Keep dressing dry and intact, do not remove dressing, if dressing becomes wet or bloody notify home health staff.  Swingbed/Home Health will change your dressing post of day #3 and day #10, give them that special dressing we sent home with you. If patient has a JOSELYN system leave on for 7 days then change dressing.  *Continue incentive spirometry at least every 2 hours while awake.  *Continue white stockings remove 2 times a day for 1 hour and replace. Once dressing is changed they will apply the other stocking to surgery leg.  *Elevate surgery leg at ankle on pillow, no pillow under knees.  *Take laxative of choice to have a bowel movement at least by tomorrow and then every other day.  *Increase fluids by mouth.  *  Leonid Hilton total knees should wear knee immobilizer at night and remove during the day.  *Staples will be removed at follow up appointment  *[facility] if any concerns.

## 2024-02-21 NOTE — OP NOTE
Department of Orthopedic Surgery    Operative Note  NAME: Cary Carbone    : 1965      DATE: 2024      PREOPERATIVE DIAGNOSIS: Primary osteoarthritis of right knee [M17.11]    POSTOPERATIVE DIAGNOSIS:  Primary osteoarthritis of right knee [M17.11]    PROCEDURE: right Total Knee Arthroplasty    SURGEON: Leonid Hilton    ASSISTANT: Miles Sim    ANESTHESIA: General + adductor canal block    BLOOD LOSS: 15 cc    TOURNIQUET TIME:  74 mins    DRAINS: None    IMPLANTS:   Implant Name Type Inv. Item Serial No.  Lot No. LRB No. Used Action   VELYS ROBOTIC-ASSISTED SOLUTION PURESIGHT ARRAY SET-KNEE      Right 1 Implanted and Explanted   VELYS ARRAY DRILL PIN      Right 1 Implanted and Explanted   VELYS ARRAY DRILL PIN      Right 1 Implanted and Explanted   VELYS ARRAY DRILL PIN      Right 1 Implanted and Explanted   VELYS ARRAY DRILL PIN      Right 1 Implanted and Explanted   SIMPLEX P SPEEDSET      Right 1 Implanted   SIMPLEX P SPEEDSET      Right 1 Implanted   PATELLA ATTUNE MEDLZD DOME 35 - [id number]  PATELLA ATTUNE MEDLZD DOME 35  [employer] [id number] Right 1 Implanted   FEMORAL POROCOAT CRUCIATE RETAINING SIZE 4 RIGHT CEMENTLESS      Right 1 Implanted   TIBIAL BASE POROCOAT ROTATING PLATFORM SIZE 3 CEMENTLESS      Right 1 Implanted   SYS KNEE EPAK PIN ATTUNE - [id number]  SYS KNEE EPAK PIN ATTUNE  [employer]  Right 1 Implanted and Explanted       Attune UnCemented Tibial Baseplate, Cruciate Retaining, Size 3  Attune UnCemented Femoral Component, Size 4  Attune Rotating Platform, Cruciate retaining 5 polyethylene spacer   Attune Size 35 Medialized dome patella button     INDICATIONS FOR PROCEDURE:   [unfilled] is a 58 y.o.-year-old with debilitating right-sided knee pain despite nonoperative measures and interested in knee arthroplasty.    PROCEDURE IN DETAIL:  After obtaining informed consent and starting the patient on preoperative IV antibiotics, the patient was taken back to the Operating    Room. Anesthesia was performed by Anesthesia Team. The right lower  extremity was prepped and draped in normal sterile fashion.  An Esmarch bandage was used to exsanguinate the affected lower extremity and the pneumatic tourniquet  was inflated to 300 mmHg.  A standard longitudinal midline incision was made beginning 3 fingerbreadths above the superior pole of the patella extending down to the tibial tubercle.  Full-thickness skin flaps were raised.  A medial parapatellar arthrotomy was made. The patella was then everted.    A release of the proximal medial tibia and MCL was then undertaken.  The infrapatella fat pad was resected.        At this point guide pins were placed along the proximal tibia and distal femur.  A utilized [employer] robotic assistance in this case and took the knee and hip through an arc of motion to find the center of the hip.  Way points were then marked along the tibia and femur.  After the appropriate registration was performed robotic assistant was utilized to create distal femoral and proximal tibial cuts.  An extension block was used to verify appropriate alignment of the joint at this point.  I then utilized a tensioner in order to verify the appropriate tension within the flexion gap and selected the appropriate parameters for rotation of the femoral component.  The femoral cuts were then performed at this point making anterior posterior and chamfer cuts appropriately.  Trials were then positioned on the femur and a floating trial was placed in the tibia and the knee was taken through a range of motion and satisfactory stability and alignment was able to be achieved.      Flexion and extension gaps were symmetric and balanced at this point.    The trial components demonstrated proper range of motion and stability.    The patella was addressed next.  A guide was utilized to resect 9.5 mm of bone.  A drill guide was applied to flat patella surface and appropriate drill holes were made.  A  trial patella button was placed.  The patella tracked nicely.  No lateral release was required.  We then Pulsavac irrigated cancellous bone and vacuum mixed the cement.      The final components were then implanted.  The tibial component was placed first, followed by the femoral component.   a trial polyethylene was placed.  The cemented patella was then placed and excess cement was removed.   Once the cement hardened,the tourniquet was released.  Bleeders were coagulated.  1 gram of TXA was given at this point. The appropriate dose of pericapsular injection was then administered, focusing on the posterior capsule.  Final polyethylene component was placed.  Satisfactory range of motion and stability was demonstrated.    The extensor retinaculum was then clsoed with #1 Vicryl figure of eight sutures with the knee in slight flexion, the subcutaneous tissue with 2-0 Vicryl, skin reapproximated with staples.  A sterile dressing was applied, as was a knee immobilizer.  Patient was then taken to the recovery room in stable condition.

## 2024-02-21 NOTE — OR NURSING
1057 Rec'd pt to PACU asleep with no distress noted, respirations even and unlabored. VSS. Right knee dressing C/D/I, pedal pulses 2+, cap refill less than 3 seconds. Immobilizer in place. No needs. Will continue to monitor.     1117 Pt c/o pain 6/10. IV morphine given, see MAR for admin.     1142 Out of PACU. VSS. No signs of bleeding/distress noted.     1145 Pt to ASC 20 drowsy but arousable to verbal stimuli. No signs of distress noted, respirations even and unlabored. Family at bedside. Bedside report given to GRIFFIN Honeycutt RN. Right knee dressing C/D/I, immobilizer in place. Right pedal pulses 2+, cap refill less than 3 seconds, able to wiggle toes on command. C/o pain, denies other needs. /84, P 96, R 16, O2 100% RA.

## 2024-02-21 NOTE — PT/OT/SLP EVAL
Occupational Therapy   Evaluation    Name: Cary Carbone  MRN: [id number]  Admitting Diagnosis: Primary osteoarthritis of right knee  Recent Surgery: Procedure(s) (LRB):  ROBOTIC ARTHROPLASTY, KNEE, TOTAL (Right) Day of Surgery    Recommendations:     Discharge Recommendations: Low Intensity Therapy  Discharge Equipment Recommendations:  walker, rolling  Barriers to discharge:  None    Assessment:     Cary Carbone is a 58 y.o. female with a medical diagnosis of Primary osteoarthritis of right knee.  She presents with s/p right TKR on 2/21/24. Performance deficits affecting function: impaired self care skills, gait instability, impaired functional mobility, impaired balance, pain.      Rehab Prognosis: Good; patient would benefit from acute skilled OT services to address these deficits and reach maximum level of function.       Plan:     Patient to be seen 5 x/week to address the above listed problems via self-care/home management, therapeutic activities, therapeutic exercises  Plan of Care Expires: 02/28/24  Plan of Care Reviewed with: patient    Subjective     Chief Complaint: s/p right TKR  Patient/Family Comments/goals: pt agreeable to OT manuela    Occupational Profile:  Living Environment: pt lives with  in one story home with one step to enter  Previous level of function: independent with all ADL tasks, IADL tasks, and functional mobility   Roles and Routines: perform self care; manager of convenient store  Equipment Used at Home: none  Assistance upon Discharge: home with home health    Pain/Comfort:  Pain Rating 1: 7/10  Location - Side 1: Right  Location 1: knee  Pain Addressed 1: Pre-medicate for activity    Patients cultural, spiritual, Adventism conflicts given the current situation: no    Objective:     Communicated with: MEJIA Singh prior to session.  Patient found HOB elevated with blood pressure cuff, knee immobilizer, peripheral IV, pulse ox (continuous) upon OT entry to room.    General  Precautions: Standard, fall  Orthopedic Precautions: RLE weight bearing as tolerated  Braces: Knee immobilizer  Respiratory Status: Room air    Occupational Performance:    Bed Mobility:    Patient completed Supine to Sit with minimum assistance  Patient completed Sit to Supine with minimum assistance    Functional Mobility/Transfers:  Patient completed Sit <> Stand Transfer with contact guard assistance  with  rolling walker   Functional Mobility: pt performed functional mobility into hallway and back to bed with CGA with RW    Activities of Daily Living:  Upper Body Dressing: modified independence to césar shirt  Lower Body Dressing: moderate assistance to césar underwear and pants    Cognitive/Visual Perceptual:  Cognitive/Psychosocial Skills:     -       Oriented to: Person, Place, Time, and Situation   -       Follows Commands/attention:Follows multistep  commands  -       Mood/Affect/Coping skills/emotional control: Cooperative    Physical Exam:  Balance:    -       sitting balance WFL; standing balance fair   Upper Extremity Range of Motion:     -       Right Upper Extremity: WFL  -       Left Upper Extremity: WFL  Upper Extremity Strength:    -       Right Upper Extremity: WFL  -       Left Upper Extremity: WFL  Gross motor coordination:   WFL    AMPAC 6 Click ADL:  AMPAC Total Score: 21    Treatment & Education:  Pt educated on OT role/POC.   Importance of OOB activity with staff assistance.  Importance of sitting up in the chair throughout the day as tolerated, especially for meals   Safety during functional t/f and mobility with use of RW  Importance of assisting with self-care activities   All questions/concerns answered within OT scope of practice      Patient left HOB elevated with all lines intact, call button in reach, MEJIA Singh notified, and family present    GOALS:   Multidisciplinary Problems       Occupational Therapy Goals          Problem: Occupational Therapy    Goal Priority Disciplines  Outcome Interventions   Occupational Therapy Goal     OT, PT/OT Ongoing, Progressing    Description: ST.Pt will perform bathing with Juliana with setup at EOB  2.Pt will perform UE dressing with Karla  3.Pt will perform LE dressing with Juliana  4.Pt will transfer bed/chair/bsc with CGA with RW  5.Pt will perform standing task x 2 min with CGA with RW  6.Tolerate 15 min of tx without fatigue.      LTG:   Restore to max I with selfcare and mobility.                           History:     Past Medical History:   Diagnosis Date    Elevated blood pressure reading without diagnosis of hypertension 2021    Essential hypertension 2021    Hypertension 2023         Past Surgical History:   Procedure Laterality Date     SECTION      COLONOSCOPY      over 10 years ago per patient @ [facility] 2021    [place]  2022    Results were negative per AMANDA Duran- recommended repeat in 3 yrs    TUBAL LIGATION         Time Tracking:     OT Date of Treatment: 24  OT Start Time: 1314  OT Stop Time: 1334  OT Total Time (min): 20 min    Billable Minutes:Evaluation OT min complexity eval    2024

## 2024-02-21 NOTE — ANESTHESIA PROCEDURE NOTES
Peripheral Block    Patient location during procedure: OR   Block not for primary anesthetic.  Reason for block: at surgeon's request and post-op pain management   Post-op Pain Location: Right knee   Start time: 2/21/2024 8:23 AM  Timeout: 2/21/2024 8:21 AM   End time: 2/21/2024 8:27 AM    Staffing  Authorizing Provider: Checo Wilson MD  Performing Provider: Checo Wilson MD    Staffing  Performed by: Checo Wilson MD  Authorized by: Checo Wilson MD    Preanesthetic Checklist  Completed: patient identified, risks and benefits discussed, pre-op evaluation and timeout performed  Peripheral Block  Patient position: supine  Prep: ChloraPrep  Block type: adductor canal  Laterality: right  Injection technique: single shot  Needle  Needle localization: ultrasound guidance     Assessment  Injection assessment: negative aspiration    Medications:    Medications: ROPIvacaine (NAROPIN) injection 0.75% - Perineural   40 mL - 2/21/2024 8:27:00 AM    Additional Notes  No complications.

## 2024-02-22 PROCEDURE — G0180 MD CERTIFICATION HHA PATIENT: HCPCS | Mod: ,,, | Performed by: ORTHOPAEDIC SURGERY

## 2024-02-22 NOTE — ANESTHESIA POSTPROCEDURE EVALUATION
Anesthesia Post Evaluation    Patient: Cary Carbone    Procedure(s) Performed: Procedure(s) (LRB):  ROBOTIC ARTHROPLASTY, KNEE, TOTAL (Right)    Final Anesthesia Type: general      Patient location during evaluation: PACU  Post-procedure vital signs: reviewed and stable  Pain management: adequate  Airway patency: patent    PONV status at discharge: No PONV  Anesthetic complications: no      Cardiovascular status: hemodynamically stable  Respiratory status: unassisted  Hydration status: euvolemic  Follow-up not needed.              Vitals Value Taken Time   /69 02/21/24 1316   Temp 36.8 °C (98.2 °F) 02/21/24 1059   Pulse 90 02/21/24 1318   Resp 16 02/21/24 1315   SpO2 97 % 02/21/24 1318   Vitals shown include unvalidated device data.      Event Time   Out of Recovery 11:42:00         Pain/Braeden Score: Pain Rating Prior to Med Admin: 10 (2/21/2024 12:09 PM)  Braeden Score: 10 (2/21/2024  1:15 PM)

## 2024-02-23 LAB
OHS QRS DURATION: 66 MS
OHS QTC CALCULATION: 426 MS

## 2024-02-28 NOTE — DISCHARGE SUMMARY
[facility] - Orthopedic Periop Services  Discharge Note  Short Stay    Procedure(s) (LRB):  ROBOTIC ARTHROPLASTY, KNEE, TOTAL (Right)      OUTCOME: Patient tolerated treatment/procedure well without complication and is now ready for discharge.    DISPOSITION: Home or Self Care    FINAL DIAGNOSIS:  Primary osteoarthritis of right knee    FOLLOWUP: In clinic    DISCHARGE INSTRUCTIONS:    Discharge Procedure Orders   Ambulatory referral/consult to Home Health   Standing Status: Future   Referral Priority: Routine Referral Type: Home Health Care   Referral Reason: Specialty Services Required   Requested Specialty: Home Health Services   Number of Visits Requested: 1     Diet general     Keep surgical extremity elevated     No driving, operating heavy equipment or signing legal documents while taking pain medication     Remove dressing in 48 hours   Order Comments: If patient has a JOSELYN negative pressure wound dressing, this can be kept in place for 7 days and then discontinued by Home health.  Standard dressings changed at 48 hours     Call MD for:  temperature >100.4     Call MD for:  persistent nausea and vomiting     Call MD for:  severe uncontrolled pain     Call MD for:  difficulty breathing, headache or visual disturbances     Call MD for:  redness, tenderness, or signs of infection (pain, swelling, redness, odor or green/yellow discharge around incision site)     Call MD for:  hives     Call MD for:  persistent dizziness or light-headedness     Call MD for:  extreme fatigue     Weight bearing as tolerated         Clinical Reference Documents Added to Patient Instructions         Document    ACID REFLUX AND GASTROESOPHAGEAL REFLUX DISEASE IN ADULTS (ENGLISH)    HIGH BLOOD PRESSURE IN ADULTS (ENGLISH)    HOW TO PUT ON AND TAKE OFF COMPRESSION STOCKINGS (ENGLISH)    HOW TO USE A WALKER (ENGLISH)    HOW TO USE AN INCENTIVE SPIROMETER (ENGLISH)    KNEE IMMOBILIZER DISCHARGE INSTRUCTIONS (ENGLISH)     OSTEOARTHRITIS (ENGLISH)    SLEEP APNEA IN ADULTS (ENGLISH)    TOTAL KNEE REPLACEMENT DISCHARGE INSTRUCTIONS  (ENGLISH)            TIME SPENT ON DISCHARGE: 9 minutes

## 2024-03-06 ENCOUNTER — OFFICE VISIT (OUTPATIENT)
Dept: ORTHOPEDICS | Facility: CLINIC | Age: 59
End: 2024-03-06
Payer: COMMERCIAL

## 2024-03-06 DIAGNOSIS — Z96.651 S/P TOTAL KNEE ARTHROPLASTY, RIGHT: Primary | ICD-10-CM

## 2024-03-06 PROCEDURE — 99024 POSTOP FOLLOW-UP VISIT: CPT | Mod: ,,, | Performed by: NURSE PRACTITIONER

## 2024-03-06 PROCEDURE — 1159F MED LIST DOCD IN RCRD: CPT | Mod: ,,, | Performed by: NURSE PRACTITIONER

## 2024-03-06 PROCEDURE — 99213 OFFICE O/P EST LOW 20 MIN: CPT | Mod: PBBFAC | Performed by: NURSE PRACTITIONER

## 2024-03-06 RX ORDER — ONDANSETRON 4 MG/1
8 TABLET, ORALLY DISINTEGRATING ORAL EVERY 8 HOURS PRN
Qty: 30 TABLET | Refills: 0 | Status: SHIPPED | OUTPATIENT
Start: 2024-03-06 | End: 2024-04-09

## 2024-03-06 RX ORDER — OXYCODONE AND ACETAMINOPHEN 10; 325 MG/1; MG/1
1 TABLET ORAL EVERY 6 HOURS PRN
Qty: 35 TABLET | Refills: 0 | Status: SHIPPED | OUTPATIENT
Start: 2024-03-06 | End: 2024-04-09

## 2024-03-06 NOTE — PROGRESS NOTES
Post-Operative Total Knee        No surgery found No surgery found      Pt is here today for First post-operative followup of her No surgery found.      she is doing well.      We have reviewed her findings and discussed plan of care and future treatment options, including the physical therapy plan.      Patient is 2 weeks postop right total knee arthroplasty, doing well.  Incision looks good today.  Staples removed and Steri-Strips applied.  No signs of infection.  She is requesting a refill on pain medication and Zofran.  We will set up outpatient physical therapy today.  Physical Exam:     The affected knee was inspected today.   The wound is healing well with no signs of erythema or warmth.  There is no drainage.  No clinical signs or symptoms of infection are present.   ROM: 2-90  -Monik's sign.  2+ pulses are present.         Assessment and Plan  1. S/P total knee arthroplasty, right            We will continue post-operative physical therapy until the patient feels that they are independent with a home rehab program.  Return to work status/ return to activities status was discussed today in detail. All questions were answered.    The use of stockings and DVT prophylaxis was discussed.  Will follow up in additional 4 weeks with radiographs at that time.     There are no Patient Instructions on file for this visit.

## 2024-03-12 ENCOUNTER — CLINICAL SUPPORT (OUTPATIENT)
Dept: REHABILITATION | Facility: HOSPITAL | Age: 59
End: 2024-03-12
Payer: COMMERCIAL

## 2024-03-12 DIAGNOSIS — Z96.651 S/P TOTAL KNEE ARTHROPLASTY, RIGHT: ICD-10-CM

## 2024-03-12 PROCEDURE — 97161 PT EVAL LOW COMPLEX 20 MIN: CPT

## 2024-03-12 PROCEDURE — 97110 THERAPEUTIC EXERCISES: CPT

## 2024-03-12 NOTE — PLAN OF CARE
[facility]   Physical Therapy Initial Evaluation      Name: Cary Carbone  Clinic Number: [id number]    Therapy Diagnosis:   Encounter Diagnosis   Name Primary?    S/P total knee arthroplasty, right         Physician: Trish Goodwin FNP    Physician Orders: PT Eval and Treat   Medical Diagnosis from Referral: s/p TKR R   Evaluation Date: 3/12/2024  Authorization Period Expiration: 3/6/2025   Plan of Care Expiration: 2024  Progress Note Due: 2024  Date of Surgery: 2024  Visit # / Visits authorized:    FOTO: to be completed on visit 6     Precautions: Standard     Time In: 8:00   Time Out: 8:50   Total Billable Time: 50 minutes (42 minutes billed and 8 minutes not billed for ice)     Subjective     Date of onset: 2024    History of current condition - Cary reports: to PT s/p R TKA on 2024. She completed home health PT services following surgery for two     Falls: None     Imaging: X-rays: pre-operative     Prior Therapy: Patient completed home health PT following surgery   Social History:  lives with their spouse  Occupation: Patient works at a [employer]   Prior Level of Function: Independent   Current Level of Function: Modified independent     Pain:  Current 5/10, worst 8/10, best 1/10   Location: right knee and shin   Description: Aching and Throbbing  Aggravating Factors: Standing, Bending, and Walking  Easing Factors: pain medication and ice    Patients goals:      Medical History:   Past Medical History:   Diagnosis Date    Elevated blood pressure reading without diagnosis of hypertension 2021    Essential hypertension 2021    Hypertension 2023       Surgical History:   Cary Carbone  has a past surgical history that includes  section; Tubal ligation; Colonoscopy; [place] (2022); and robotic arthroplasty, knee (Right, 2024).    Medications:   Cary has a current medication list which includes the following  prescription(s): alpha-d-galactosidase, aspirin, celecoxib, cholecalciferol (vitamin d3), hydrochlorothiazide, latanoprost, nystatin, ondansetron, oxycodone-acetaminophen, paroxetine, prednisone, senna-docusate 8.6-50 mg, and triamcinolone acetonide 0.1%.    Allergies:   Review of patient's allergies indicates:  No Known Allergies     Objective    Incisions: Patient's incision is healing well with no signs of drainage or infection   Girth Measurements: Right 46 cm at midpatella  Left 43 cm at midpatella     Range of motion:  Motion Right Left    Hip flexion  WITHIN FUNCTIONAL LIMITS  WITHIN FUNCTIONAL LIMITS   Hip extension  WITHIN FUNCTIONAL LIMITS  WITHIN FUNCTIONAL LIMITS   Hip abduction  WITHIN FUNCTIONAL LIMITS  WITHIN FUNCTIONAL LIMITS   Hip adduction  WITHIN FUNCTIONAL LIMITS  WITHIN FUNCTIONAL LIMITS   Knee extension  5 degrees from 0   WITHIN FUNCTIONAL LIMITS   Knee flexion  92 degrees   WITHIN FUNCTIONAL LIMITS   Ankle DF  WITHIN FUNCTIONAL LIMITS  WITHIN FUNCTIONAL LIMITS   Ankle PF  WITHIN FUNCTIONAL LIMITS  WITHIN FUNCTIONAL LIMITS   Ankle Inversion  WITHIN FUNCTIONAL LIMITS  WITHIN FUNCTIONAL LIMITS   Ankle Eversion  WITHIN FUNCTIONAL LIMITS  WITHIN FUNCTIONAL LIMITS       Manual muscle test   Muscle Right  Left    Hip flexion  MMT strength: 3+/5  MMT strength: 4+/5   Hip extension  MMT strength: 3+/5  MMT strength: 4+/5   Hip abduction  MMT strength: 4-/5  MMT strength: 4+/5   Hip adduction  MMT strength: 4-/5  MMT strength: 4+/5   Knee extension  MMT strength: 2-/5  MMT strength: 5/5   Knee flexion  MMT strength: 2-/5  MMT strength: 5/5   Ankle DF  MMT strength: 4+/5  MMT strength: 5/5   Ankle PF  MMT strength: 4+/5  MMT strength: 5/5   Ankle inversion  MMT strength: 4+/5  MMT strength: 5/5   Ankle eversion  MMT strength: 4+/5  MMT strength: 5/5     Gait:  Weight bearing precautions: WBAT  Assistive device: straight cane  Ambulation  deviations:  Stairs: Patient negotiates up and down stairs with  "use of one handrail with non-reciprocal gait pattern  Gait Speed: 2.24 feet per second       Clinical Special Tests:  5 x sit<>stand: unable to complete without UE use at this time.     Intake Outcome Measure for FOTO Survey    Therapist reviewed FOTO scores for Cary Carbone on 3/12/2024.   FOTO report - see Media section or FOTO account episode details.    Intake Score: 54%         Treatment     Total Treatment time (time-based codes) separate from Evaluation: 39 minutes (31 minutes billed and 8 minutes not billed for ice)     Cary received the treatments listed below:      therapeutic exercises to develop strength, endurance, ROM, and flexibility for 31 minutes including:    Ther- Ex Reps   Nustep 6 minutes    Wedge 1 minute    Hamstring Stretch 2 x 20"    Heel slides (seated)  2 x 10 (92 degrees)    LAQ's 20 x 3"    Hamstring Curls 2 x 10 red TB    Quad sets  3 x 10 3"    SAQ's 2 x 10 3"    Straight Leg Raises 3 x 5 reps    Sidelying Hip ABD  2 x 10                Ther-Act    Heel Raises    Mini Squats    3 way hip    Sit <> stands     Forward Step ups    Lateral Step ups                          cold pack for 8 minutes to R knee with elevation above heart level .    Patient Education and Home Exercises     Education provided:   - eval findings, plan of care and Home exercise program     Written Home Exercises Provided: yes. Exercises were reviewed and Cary was able to demonstrate them prior to the end of the session.  Cary demonstrated good  understanding of the education provided. See EMR under Patient Instructions for exercises provided during therapy sessions.    Assessment     Cary is a 58 y.o. female referred to outpatient Physical Therapy with a medical diagnosis of R TKR. Patient presents with R knee pain, decreased R knee ROM, decreased R LE muscle strength and motor control. Patient's impairments are currently limiting her overall mobility and activity tolerance. Patient can benefit from " skilled PT services to address listed impairments and promote return to PLOF and return to work.     PT positioned patient properly on NuStep to decrease joint stiffness and increase R knee flexion active range of motion prior to exercises. PT provided patient with visual, verbal and tactile cueing throughout session for proper R LE alignment, speed, form, and decreased compensations with exercises. Patient demonstrates approximately five degree of R quad lag during straight leg raises exercises therefore patient completed sets of five reps today. No adverse effects noted to treatment tasks.     Patient prognosis is Good.   Patient will benefit from skilled outpatient Physical Therapy to address the deficits stated above and in the chart below, provide patient /family education, and to maximize patientt's level of independence.     Plan of care discussed with patient: Yes  Patient's spiritual, cultural and educational needs considered and patient is agreeable to the plan of care and goals as stated below:     Anticipated Barriers for therapy: N/A    Medical Necessity is demonstrated by the following  History  Co-morbidities and personal factors that may impact the plan of care [x] LOW: no personal factors / co-morbidities  [] MODERATE: 1-2 personal factors / co-morbidities  [] HIGH: 3+ personal factors / co-morbidities    Moderate / High Support Documentation:   Co-morbidities affecting plan of care: N/A    Personal Factors:   no deficits     Examination  Body Structures and Functions, activity limitations and participation restrictions that may impact the plan of care [x] LOW: addressing 1-2 elements  [] MODERATE: 3+ elements  [] HIGH: 4+ elements (please support below)    Moderate / High Support Documentation: N/A     Clinical Presentation [x] LOW: stable  [] MODERATE: Evolving  [] HIGH: Unstable     Decision Making/ Complexity Score: low       Goals:  Short Term Goals: 2 weeks   Patient will independently  complete Home exercise program with correct form.   Patient will report decreased R knee pain to less than or equal to 3/10 for improved quality of life.   Patient will complete sit to stand transfer with no UE use for increased independence with function transfers.     Long Term Goals: 4 weeks   Pt will increase R knee flexion to 120 degrees, knee extension to 0 degrees, and quad lag to 0 degrees to allow patient normal gait pattern.  Pt will increase R knee strength to 4/5 to allow patient to safely return to prior level of function   Pt will report decrease in R knee pain to 2/10 with standing for greater than 30 minutes for increased activity tolerance.   Pt will ambulate community distance with increased gait speed to greater than or equal to 3.25 feet per second for increased safety with community ambulation.   Pt will complete 5 x sit<> less than or equal to 15 seconds indicating increased power for functional transfers  Pt will negotiate up and down stairs with reciprocal pattern for increased independence with community ambulation.     Plan   Plan of care Certification: 3/12/2024 to 4/5/2024.    Outpatient Physical Therapy 3 times weekly for 4 weeks to include the following interventions: Electrical Stimulation unattended, Gait Training, Manual Therapy, Moist Heat/ Ice, Neuromuscular Re-ed, Patient Education, Therapeutic Activities, and Therapeutic Exercise.     Caleb German, PT, DPT         Physician's Signature: _________________________________________ Date: ________________

## 2024-03-13 ENCOUNTER — CLINICAL SUPPORT (OUTPATIENT)
Dept: REHABILITATION | Facility: HOSPITAL | Age: 59
End: 2024-03-13
Payer: COMMERCIAL

## 2024-03-13 DIAGNOSIS — Z96.651 S/P TOTAL KNEE ARTHROPLASTY, RIGHT: Primary | ICD-10-CM

## 2024-03-13 PROCEDURE — 97530 THERAPEUTIC ACTIVITIES: CPT | Mod: CQ

## 2024-03-13 PROCEDURE — 97110 THERAPEUTIC EXERCISES: CPT | Mod: CQ

## 2024-03-13 PROCEDURE — 97112 NEUROMUSCULAR REEDUCATION: CPT | Mod: CQ

## 2024-03-13 NOTE — PROGRESS NOTES
Physical Therapy Treatment Note     Name: Cary Carbone  Clinic Number: [id number]    Therapy Diagnosis:   Encounter Diagnosis   Name Primary?    S/P total knee arthroplasty, right Yes     Physician: Trish Goodwin FNP    Visit Date: 3/13/2024    Physician Orders: PT Eval and Treat   Medical Diagnosis from Referral: s/p TKR R   Evaluation Date: 3/12/2024  Authorization Period Expiration: 3/6/2025   Plan of Care Expiration: 4/5/2024  Progress Note Due: 4/5/2024  Date of Surgery: 2/21/2024  Visit # / Visits authorized: 2/12   FOTO: to be completed on visit 6   PTA Visit #: 1    Time In: 1325  Time Out: 1415  Total Billable Time: 50 minutes (+10 minutes ice pack)    Precautions: Standard      Received Plan of Care per Dora German DPT    Subjective     Pt reports: pain as noted; has taken pain meds today. Using single point cane with ambulation  She was compliant with home exercise program.  Response to previous treatment: no c/o after eval  Functional change: first visit after eval    Pain: 4/10  Location: right knee      Objective     Range of motion measures right knee:   Flexion   100/102 degrees   Extension            -5 degrees      Quad lag            -8 degrees     Cary received therapeutic exercises to develop strength, endurance, ROM, and flexibility for 15 minutes including:  Nustep x 5 minutes   Slant board bilateral calf stretch x 2 minutes   Hamstring stretch, 3x20 second hold on steps   Supine knee flexion stretching with ball x 20 repetitions     Cary participated in neuromuscular re-education activities to improve: Balance, Coordination, Sense, and Proprioception for 15 minutes. The following activities were included:  Quad sets with electrical muscle stimulation x 5 minutes (10/10 cycle)  Short arc quad with electrical muscle stimulation x 5 minutes (10/10 cycle)  Straight leg raises with electrical muscle stimulation x 2 minutes (10/10 cycle)    Cary participated in dynamic functional  "therapeutic activities to improve functional performance for 18 minutes, including:  BLE exercises x 20 repetitions each at railing: marching, mini squats, heel/toe raises, hip abduction, hip extension   6" toe taps alternating BLE's x 15 repetitions each  Sit to stand transfers, 2x10 repetitions, no BUE support     Cary participated in gait training to improve functional mobility and safety for  2 minutes, including:  Terminal knee extension with heel strike, knee flexion with toe off    Cary received cold pack for 10 minutes to right knee at end of treatment       Home Exercises Provided and Patient Education Provided     Education provided: continue current home exercise program with exercises added as done today, pain free    Written Home Exercises Provided: yes.  Exercises were reviewed and Cary was able to demonstrate them prior to the end of the session.  Cary demonstrated good  understanding of the education provided.     See EMR under Patient Instructions for exercises provided  3/12/2024 .    Assessment     Improving knee flexion as noted above; gradually improving quad strength  Cary Is progressing well towards her goals.   Pt prognosis is Excellent.     Pt will continue to benefit from skilled outpatient physical therapy to address the deficits listed in the problem list box on initial evaluation, provide pt/family education and to maximize pt's level of independence in the home and community environment.      Anticipated barriers to physical therapy: home exercise program compliance, natural course of healing    Goals:  Short Term Goals: 2 weeks   Patient will independently complete Home exercise program with correct form.   Patient will report decreased R knee pain to less than or equal to 3/10 for improved quality of life.   Patient will complete sit to stand transfer with no UE use for increased independence with function transfers.      Long Term Goals: 4 weeks   Pt will increase R knee " flexion to 120 degrees, knee extension to 0 degrees, and quad lag to 0 degrees to allow patient normal gait pattern.  Pt will increase R knee strength to 4/5 to allow patient to safely return to prior level of function   Pt will report decrease in R knee pain to 2/10 with standing for greater than 30 minutes for increased activity tolerance.   Pt will ambulate community distance with increased gait speed to greater than or equal to 3.25 feet per second for increased safety with community ambulation.   Pt will complete 5 x sit<> less than or equal to 15 seconds indicating increased power for functional transfers  Pt will negotiate up and down stairs with reciprocal pattern for increased independence with community ambulation.     Plan     Plan of care Certification: 3/12/2024 to 4/5/2024.     Outpatient Physical Therapy 3 times weekly for 4 weeks to include the following interventions: Electrical Stimulation unattended, Gait Training, Manual Therapy, Moist Heat/ Ice, Neuromuscular Re-ed, Patient Education, Therapeutic Activities, and Therapeutic Exercise.     Continue per Plan of Care and progress as pt able.  Maranda Gregorio, PTA  3/13/2024

## 2024-03-15 ENCOUNTER — EXTERNAL HOME HEALTH (OUTPATIENT)
Dept: HOME HEALTH SERVICES | Facility: HOSPITAL | Age: 59
End: 2024-03-15
Payer: COMMERCIAL

## 2024-03-15 ENCOUNTER — CLINICAL SUPPORT (OUTPATIENT)
Dept: REHABILITATION | Facility: HOSPITAL | Age: 59
End: 2024-03-15
Payer: COMMERCIAL

## 2024-03-15 DIAGNOSIS — Z96.651 S/P TOTAL KNEE ARTHROPLASTY, RIGHT: Primary | ICD-10-CM

## 2024-03-15 PROCEDURE — 97010 HOT OR COLD PACKS THERAPY: CPT | Mod: CQ

## 2024-03-15 PROCEDURE — 97110 THERAPEUTIC EXERCISES: CPT | Mod: CQ

## 2024-03-15 NOTE — PROGRESS NOTES
"[facility]   Physical Therapy Treatment Note     Name: Cary Carbone  Clinic Number: [id number]    Therapy Diagnosis: No diagnosis found.  Physician: Trish Goodwin, KOFIP    Visit Date: 3/15/2024    Physician Orders: PT Eval and Treat   Medical Diagnosis from Referral: s/p TKR R   Evaluation Date: 3/12/2024  Authorization Period Expiration: 3/6/2025   Plan of Care Expiration: 4/5/2024  Progress Note Due: 4/5/2024  Date of Surgery: 2/21/2024  Visit # / Visits authorized: 2/12   FOTO: to be completed on visit 6      Precautions: Standard      Time In: 13:21   Time Out: 14:02   Total Billable Time: 39 minutes (33 minutes billed and 6 minutes not billed for ice)    PTA Visit #: 1/5       SUBJECTIVE     Pt reports: "I'm just a little sore but I've been working on it at home."  She was compliant with home exercise program.  Response to previous treatment: N/A  Functional change: Too early in Plan of Care     Pain: 0/10  Location: right knee      OBJECTIVE     Objective Measures updated at progress report unless specified.     Treatment     Cary received the treatments listed below:      therapeutic exercises to develop strength, endurance, ROM, and flexibility for 31 minutes including:     Ther- Ex Reps   Nustep 6 minutes    Wedge 2 minute    Hamstring Stretch 2 x 20"    Heel slides (seated)  2 x 10 (106 degrees)    LAQ's 20 x 3"    Hamstring Curls 2 x 10 red TB    Quad sets  3 x 10 3"    SAQ's 2 x 10 3"    4 way hip 2 x 10 each                         Ther-Act     Heel Raises     Mini Squats     3 way hip     Sit <> stands      Forward Step ups     Lateral Step ups                                      cold pack for 6 minutes to R knee with elevation above heart level .        Patient Education and Home Exercises     Home Exercises Provided and Patient Education Provided     Education provided:   - Plan of Care, Home exercise program, Delayed onset muscle soreness     Written Home Exercises " Provided: Patient instructed to cont prior HEP. Exercises were reviewed and Cary was able to demonstrate them prior to the end of the session.  Cary demonstrated good  understanding of the education provided. See EMR under Patient Instructions for exercises provided during therapy sessions    ASSESSMENT   Cary is a 58 y.o. female referred to outpatient Physical Therapy with a medical diagnosis of R TKR. Patient presents with R knee pain, decreased R knee ROM, decreased R LE muscle strength and motor control. Patient's impairments are currently limiting her overall mobility and activity tolerance. Patient can benefit from skilled PT services to address listed impairments and promote return to PLOF and return to work. LPTA provided pt with proper setup on rec bike to decrease stiffness and improve range of motion prior to tx session. LPTA prompted pt with visual, verbal and tactile cueing throughout session for proper R LE alignment, speed, form, and decreased compensations with exercises. Pt displays improvement in right knee flexion and extension. No adverse effects noted.     Patient prognosis is Good.   Patient will benefit from skilled outpatient Physical Therapy to address the deficits stated above and in the chart below, provide patient /family education, and to maximize patientt's level of independence.      Plan of care discussed with patient: Yes  Patient's spiritual, cultural and educational needs considered and patient is agreeable to the plan of care and goals as stated below:      Anticipated Barriers for therapy: N/A    Goals:  Short Term Goals: 2 weeks   Patient will independently complete Home exercise program with correct form.   Patient will report decreased R knee pain to less than or equal to 3/10 for improved quality of life.   Patient will complete sit to stand transfer with no UE use for increased independence with function transfers.      Long Term Goals: 4 weeks   Pt will increase R  knee flexion to 120 degrees, knee extension to 0 degrees, and quad lag to 0 degrees to allow patient normal gait pattern.  Pt will increase R knee strength to 4/5 to allow patient to safely return to prior level of function   Pt will report decrease in R knee pain to 2/10 with standing for greater than 30 minutes for increased activity tolerance.   Pt will ambulate community distance with increased gait speed to greater than or equal to 3.25 feet per second for increased safety with community ambulation.   Pt will complete 5 x sit<> less than or equal to 15 seconds indicating increased power for functional transfers  Pt will negotiate up and down stairs with reciprocal pattern for increased independence with community ambulation.        PLAN   Plan of care Certification: 3/12/2024 to 4/5/2024.     Outpatient Physical Therapy 3 times weekly for 4 weeks to include the following interventions: Electrical Stimulation unattended, Gait Training, Manual Therapy, Moist Heat/ Ice, Neuromuscular Re-ed, Patient Education, Therapeutic Activities, and Therapeutic Exercise.     Continue POC per PT orders to progress patient toward rehab goals.    VANNA Lipscomb  3/15/2024

## 2024-03-16 ENCOUNTER — DOCUMENT SCAN (OUTPATIENT)
Dept: HOME HEALTH SERVICES | Facility: HOSPITAL | Age: 59
End: 2024-03-16
Payer: COMMERCIAL

## 2024-03-18 ENCOUNTER — CLINICAL SUPPORT (OUTPATIENT)
Dept: REHABILITATION | Facility: HOSPITAL | Age: 59
End: 2024-03-18
Payer: COMMERCIAL

## 2024-03-18 DIAGNOSIS — Z96.651 S/P TOTAL KNEE ARTHROPLASTY, RIGHT: Primary | ICD-10-CM

## 2024-03-18 PROCEDURE — 97530 THERAPEUTIC ACTIVITIES: CPT | Mod: CQ

## 2024-03-18 PROCEDURE — 97110 THERAPEUTIC EXERCISES: CPT | Mod: CQ

## 2024-03-18 NOTE — PROGRESS NOTES
"[facility]   Physical Therapy Treatment Note     Name: Cary Carbone  Clinic Number: [id number]    Therapy Diagnosis: No diagnosis found.  Physician: Trish Goodwin, AMANDA    Visit Date: 3/18/2024    Physician Orders: PT Eval and Treat   Medical Diagnosis from Referral: s/p TKR R   Evaluation Date: 3/12/2024  Authorization Period Expiration: 3/6/2025   Plan of Care Expiration: 4/5/2024  Progress Note Due: 4/5/2024  Date of Surgery: 2/21/2024  Visit # / Visits authorized: 5/12   FOTO: to be completed on visit 6      Precautions: Standard      Time In: 13:15  Time Out: 14:00  Total Billable Time: 45 minutes     PTA Visit #: 2/5      SUBJECTIVE     Pt reports:  Soreness in Right knee, and indicates some edema at distal point of incision.      She was compliant with home exercise program.  Response to previous treatment: N/A  Functional change: Too early in Plan of Care     Pain: 0/10  Location: right knee      OBJECTIVE     Objective Measures updated at progress report unless specified.     Treatment     Cary received the treatments listed below:      therapeutic exercises to develop strength, endurance, ROM, and flexibility.  See flow-sheet below.         Therapeutic activities to improve static and dynamic functional performance.  See flow-sheet below.     Ther- Ex Reps   Nustep 8 minutes  *   Wedge 2 minute    Hamstring Stretch 2 x 20"    Heel slides (seated)  2 x 10 (106 degrees)    LAQ's 2 x 10, 2# *    Hamstring Curls 3 x 10 red TB *   Quad sets  3 x 10 3"    SAQ's 2 x 10, 2# *    4 way hip 2 x 10 each                         Ther-Act     Heel Raises 2 x 10 *    Mini Squats 10 x *    3 way hip     Sit <> stands  10 x *    Forward Step ups     Lateral Step ups     Standing marching  2 x 10 *                               cold pack for 6 minutes to R knee with elevation above heart level .        Patient Education and Home Exercises     Home Exercises Provided and Patient Education " Provided     Education provided:   - Plan of Care, Home exercise program, Delayed onset muscle soreness     Written Home Exercises Provided: Patient instructed to cont prior HEP. Exercises were reviewed and Cary was able to demonstrate them prior to the end of the session.  Cary demonstrated good  understanding of the education provided. See EMR under Patient Instructions for exercises provided during therapy sessions    ASSESSMENT   Cary is a 58 y.o. female referred to outpatient Physical Therapy with a medical diagnosis of R TKR. Patient presents with R knee pain, decreased R knee ROM, decreased R LE muscle strength and motor control. Patient's impairments are currently limiting her overall mobility and activity tolerance. Patient can benefit from skilled PT services to address listed impairments and promote return to PLOF and return to work. LPTA provided pt with proper setup on rec bike to decrease stiffness and improve range of motion prior to tx session. LPTA prompted pt with visual, verbal and tactile cueing throughout session for proper R LE alignment, speed, form, and decreased compensations with exercises. LPTA added Therapeutic activities to treatment, and provided cues to patient to limit off-loading Right knee/LE during completion of Therapeutic activities.  No adverse effects noted or reported.      Patient prognosis is Good.   Patient will benefit from skilled outpatient Physical Therapy to address the deficits stated above and in the chart below, provide patient /family education, and to maximize patientt's level of independence.      Plan of care discussed with patient: Yes  Patient's spiritual, cultural and educational needs considered and patient is agreeable to the plan of care and goals as stated below:      Anticipated Barriers for therapy: N/A    Goals:  Short Term Goals: 2 weeks   Patient will independently complete Home exercise program with correct form.   Patient will report  decreased R knee pain to less than or equal to 3/10 for improved quality of life.   Patient will complete sit to stand transfer with no UE use for increased independence with function transfers.      Long Term Goals: 4 weeks   Pt will increase R knee flexion to 120 degrees, knee extension to 0 degrees, and quad lag to 0 degrees to allow patient normal gait pattern.  Pt will increase R knee strength to 4/5 to allow patient to safely return to prior level of function   Pt will report decrease in R knee pain to 2/10 with standing for greater than 30 minutes for increased activity tolerance.   Pt will ambulate community distance with increased gait speed to greater than or equal to 3.25 feet per second for increased safety with community ambulation.   Pt will complete 5 x sit<> less than or equal to 15 seconds indicating increased power for functional transfers  Pt will negotiate up and down stairs with reciprocal pattern for increased independence with community ambulation.        PLAN   Plan of care Certification: 3/12/2024 to 4/5/2024.     Outpatient Physical Therapy 3 times weekly for 4 weeks to include the following interventions: Electrical Stimulation unattended, Gait Training, Manual Therapy, Moist Heat/ Ice, Neuromuscular Re-ed, Patient Education, Therapeutic Activities, and Therapeutic Exercise.     Continue POC per PT orders to progress patient toward rehab goals.    VANNA Ceja   3/18/2024

## 2024-03-20 ENCOUNTER — CLINICAL SUPPORT (OUTPATIENT)
Dept: REHABILITATION | Facility: HOSPITAL | Age: 59
End: 2024-03-20
Payer: COMMERCIAL

## 2024-03-20 DIAGNOSIS — Z96.651 S/P TOTAL KNEE ARTHROPLASTY, RIGHT: Primary | ICD-10-CM

## 2024-03-20 PROCEDURE — 97530 THERAPEUTIC ACTIVITIES: CPT | Mod: CQ

## 2024-03-20 PROCEDURE — 97110 THERAPEUTIC EXERCISES: CPT | Mod: CQ

## 2024-03-20 NOTE — PROGRESS NOTES
"[facility]   Physical Therapy Treatment Note     Name: Cary Carbone  Clinic Number: [id number]    Therapy Diagnosis: No diagnosis found.  Physician: Trish Godowin, KOFIP    Visit Date: 3/20/2024    Physician Orders: PT Eval and Treat   Medical Diagnosis from Referral: s/p TKR R   Evaluation Date: 3/12/2024  Authorization Period Expiration: 3/6/2025   Plan of Care Expiration: 4/5/2024  Progress Note Due: 4/5/2024  Date of Surgery: 2/21/2024  Visit # / Visits authorized: 5/12   FOTO: to be completed on visit 6      Precautions: Standard      Time In: 13:20   Time Out: 14:00  Total Billable Time: 40 minutes     PTA Visit #: 2/5      SUBJECTIVE     Pt reports: "My knee just feels a little tight."    She was compliant with home exercise program.  Response to previous treatment: N/A  Functional change: Too early in Plan of Care     Pain: 0/10  Location: right knee      OBJECTIVE     Objective Measures updated at progress report unless specified.     Treatment     Cary received the treatments listed below:      therapeutic exercises to develop strength, endurance, ROM, and flexibility.  See flow-sheet below.       Ther- Ex Reps   Nustep 8 minutes  *   Wedge 2 minute    Hamstring Stretch 2 x 20"    Heel slides (seated)  2 x 10 (106 degrees)    LAQ's 2 x 10, 2#    Hamstring Curls 3 x 10 red TB    Quad sets  3 x 10 3"    SAQ's 2 x 10, 2# *    4 way hip 2 x 10 each                         Ther-Act     Heel Raises 2 x 10    Mini Squats 10 x    3 way hip     Stair knee flexion stretch 5 x 10"   Sit <> stands  2 x 10    Forward Step ups     Lateral Step ups     Standing marching  2 x 10                               cold pack for 6 minutes to R knee with elevation above heart level .        Patient Education and Home Exercises     Home Exercises Provided and Patient Education Provided     Education provided:   - Plan of Care, Home exercise program, Delayed onset muscle soreness     Written Home " Exercises Provided: Patient instructed to cont prior HEP. Exercises were reviewed and Cary was able to demonstrate them prior to the end of the session.  Cary demonstrated good  understanding of the education provided. See EMR under Patient Instructions for exercises provided during therapy sessions    ASSESSMENT   Cary is a 58 y.o. female referred to outpatient Physical Therapy with a medical diagnosis of R TKR. Patient presents with R knee pain, decreased R knee ROM, decreased R LE muscle strength and motor control. Patient's impairments are currently limiting her overall mobility and activity tolerance. Patient can benefit from skilled PT services to address listed impairments and promote return to PLOF and return to work. LPTA provided pt with proper setup on rec bike to decrease stiffness and improve range of motion prior to tx session. LPTA prompted pt with visual, verbal and tactile cueing throughout session for proper R LE alignment, speed, form, and decreased compensations with exercises. Pt progressed through tx with improved mechanics and strength. Pt required occasional rest breaks secondary to muscle fatigue. No adverse effects noted.     Patient prognosis is Good.   Patient will benefit from skilled outpatient Physical Therapy to address the deficits stated above and in the chart below, provide patient /family education, and to maximize patientt's level of independence.      Plan of care discussed with patient: Yes  Patient's spiritual, cultural and educational needs considered and patient is agreeable to the plan of care and goals as stated below:      Anticipated Barriers for therapy: N/A    Goals:  Short Term Goals: 2 weeks   Patient will independently complete Home exercise program with correct form.   Patient will report decreased R knee pain to less than or equal to 3/10 for improved quality of life.   Patient will complete sit to stand transfer with no UE use for increased independence  with function transfers.      Long Term Goals: 4 weeks   Pt will increase R knee flexion to 120 degrees, knee extension to 0 degrees, and quad lag to 0 degrees to allow patient normal gait pattern.  Pt will increase R knee strength to 4/5 to allow patient to safely return to prior level of function   Pt will report decrease in R knee pain to 2/10 with standing for greater than 30 minutes for increased activity tolerance.   Pt will ambulate community distance with increased gait speed to greater than or equal to 3.25 feet per second for increased safety with community ambulation.   Pt will complete 5 x sit<> less than or equal to 15 seconds indicating increased power for functional transfers  Pt will negotiate up and down stairs with reciprocal pattern for increased independence with community ambulation.        PLAN   Plan of care Certification: 3/12/2024 to 4/5/2024.     Outpatient Physical Therapy 3 times weekly for 4 weeks to include the following interventions: Electrical Stimulation unattended, Gait Training, Manual Therapy, Moist Heat/ Ice, Neuromuscular Re-ed, Patient Education, Therapeutic Activities, and Therapeutic Exercise.     Continue POC per PT orders to progress patient toward rehab goals.    VANNA Lipscomb   3/20/2024

## 2024-03-22 ENCOUNTER — CLINICAL SUPPORT (OUTPATIENT)
Dept: REHABILITATION | Facility: HOSPITAL | Age: 59
End: 2024-03-22
Payer: COMMERCIAL

## 2024-03-22 DIAGNOSIS — Z96.651 S/P TOTAL KNEE ARTHROPLASTY, RIGHT: Primary | ICD-10-CM

## 2024-03-22 PROCEDURE — 97530 THERAPEUTIC ACTIVITIES: CPT

## 2024-03-22 PROCEDURE — 97110 THERAPEUTIC EXERCISES: CPT

## 2024-03-22 NOTE — PROGRESS NOTES
"[facility]   Physical Therapy Treatment Note     Name: Cary Carbone  Clinic Number: [id number]    Therapy Diagnosis: R knee pain   Physician: Trish Goodwin, KOFIP    Visit Date: 3/22/2024    Physician Orders: PT Eval and Treat   Medical Diagnosis from Referral: s/p TKR R   Evaluation Date: 3/12/2024  Authorization Period Expiration: 3/6/2025   Plan of Care Expiration: 4/5/2024  Progress Note Due: 4/5/2024  Date of Surgery: 2/21/2024  Visit # / Visits authorized: 6/12   FOTO: to be completed on visit 12     Precautions: Standard      Time In: 8:02  Time Out: 9:00  Total Billable Time: 58 minutes (50 minutes billed and 8 minutes not billed for ice)     PTA Visit #: 0/5      SUBJECTIVE     Pt reports: "I'm going on my first long ride today. WE are headed to [place] when I leave here."     She was compliant with home exercise program.  Response to previous treatment: N/A  Functional change: improved mobility with less pain.     Pain: 0/10  Location: right knee      OBJECTIVE     Objective Measures updated at progress report unless specified.     Treatment     Cary received the treatments listed below:      therapeutic exercises to develop strength, endurance, ROM, and flexibility.  See flow-sheet below.       Ther- Ex Reps   Nustep 8 minutes     Wedge 2 minute    Hamstring Stretch 2 x 20"    Heel slides (seated)  2 x 10 (108 degrees)    LAQ's 3x 10, 2#    Hamstring Curls 3 x 10 red TB    Quad sets  3 x 10 3" (2 degrees)    SAQ's 3 x 10, 2#    Supine heel slides  2 x 10 (110 degrees)    Straight leg raises  3 x 10                      Ther-Act     Heel Raises 2 x 10    Mini Squats 2 x 10    3 way hip     Stair knee flexion stretch 5 x 10"   Sit <> stands  2 x 10    Forward Step ups  2 x 10    Lateral Step ups     Standing marching  2 x 10                               cold pack for 8 minutes to R knee with elevation above heart level .    Patient Education and Home Exercises     Home " Exercises Provided and Patient Education Provided     Education provided:   - Plan of Care, Home exercise program, Delayed onset muscle soreness     Written Home Exercises Provided: Patient instructed to cont prior HEP. Exercises were reviewed and Cary was able to demonstrate them prior to the end of the session.  Cary demonstrated good  understanding of the education provided. See EMR under Patient Instructions for exercises provided during therapy sessions    ASSESSMENT   Cary is a 58 y.o. female referred to outpatient Physical Therapy with a medical diagnosis of R TKR. Patient presents with R knee pain, decreased R knee ROM, decreased R LE muscle strength and motor control. Patient's impairments are currently limiting her overall mobility and activity tolerance. Patient can benefit from skilled PT services to address listed impairments and promote return to PLOF and return to work. PT  provided pt with proper setup on NuStep to decrease stiffness and improve range of motion prior to tx session. Patient demonstrates good carryover of exercise instruction requiring only minimal cueing for form and speed with exercises. Patient demonstrated increased R knee active range of motion in flexion and extension. Patient with no adverse effects to treatment tasks.     Patient prognosis is Good.   Patient will benefit from skilled outpatient Physical Therapy to address the deficits stated above and in the chart below, provide patient /family education, and to maximize patientt's level of independence.      Plan of care discussed with patient: Yes  Patient's spiritual, cultural and educational needs considered and patient is agreeable to the plan of care and goals as stated below:      Anticipated Barriers for therapy: N/A    Goals:  Short Term Goals: 2 weeks   Patient will independently complete Home exercise program with correct form.   Patient will report decreased R knee pain to less than or equal to 3/10 for  improved quality of life.   Patient will complete sit to stand transfer with no UE use for increased independence with function transfers.      Long Term Goals: 4 weeks   Pt will increase R knee flexion to 120 degrees, knee extension to 0 degrees, and quad lag to 0 degrees to allow patient normal gait pattern.  Pt will increase R knee strength to 4/5 to allow patient to safely return to prior level of function   Pt will report decrease in R knee pain to 2/10 with standing for greater than 30 minutes for increased activity tolerance.   Pt will ambulate community distance with increased gait speed to greater than or equal to 3.25 feet per second for increased safety with community ambulation.   Pt will complete 5 x sit<> less than or equal to 15 seconds indicating increased power for functional transfers  Pt will negotiate up and down stairs with reciprocal pattern for increased independence with community ambulation.        PLAN   Plan of care Certification: 3/12/2024 to 4/5/2024.     Outpatient Physical Therapy 3 times weekly for 4 weeks to include the following interventions: Electrical Stimulation unattended, Gait Training, Manual Therapy, Moist Heat/ Ice, Neuromuscular Re-ed, Patient Education, Therapeutic Activities, and Therapeutic Exercise.     Continue POC per PT orders to progress patient toward rehab goals.    Caleb German, PT, DPT     3/22/2024

## 2024-03-25 ENCOUNTER — CLINICAL SUPPORT (OUTPATIENT)
Dept: REHABILITATION | Facility: HOSPITAL | Age: 59
End: 2024-03-25
Payer: COMMERCIAL

## 2024-03-25 DIAGNOSIS — Z96.651 S/P TOTAL KNEE ARTHROPLASTY, RIGHT: Primary | ICD-10-CM

## 2024-03-25 PROCEDURE — 97110 THERAPEUTIC EXERCISES: CPT | Mod: CQ

## 2024-03-25 PROCEDURE — 97530 THERAPEUTIC ACTIVITIES: CPT | Mod: CQ

## 2024-03-25 NOTE — PROGRESS NOTES
"[facility]   Physical Therapy Treatment Note     Name: Cary Carbone  Clinic Number: [id number]    Therapy Diagnosis: R knee pain   Physician: Trish Goodwin, AMANDA    Visit Date: 3/25/2024    Physician Orders: PT Eval and Treat   Medical Diagnosis from Referral: s/p TKR R   Evaluation Date: 3/12/2024  Authorization Period Expiration: 3/6/2025   Plan of Care Expiration: 4/5/2024  Progress Note Due: 4/5/2024  Date of Surgery: 2/21/2024  Visit # / Visits authorized: 7/12   FOTO: to be completed on visit 12     Precautions: Standard      Time In: 13:15  Time Out: 13:05  Total Billable Time: 50 minutes      PTA Visit #: 1/5      SUBJECTIVE     Pt reports: "My knee is feeling good even after that car right to [place]".  Patient denies reports of pain in Right knee     She was compliant with home exercise program.  Response to previous treatment: N/A  Functional change: improved mobility with less pain.     Pain: 0/10  Location: right knee      OBJECTIVE     Objective Measures updated at progress report unless specified.     Treatment     Cary received the treatments listed below:      therapeutic exercises to develop strength, endurance, ROM, and flexibility.  See flow-sheet below.       Ther- Ex Reps   Nustep 8 minutes     Wedge 2 minute    Hamstring Stretch 2 x 20"    Heel slides (seated)  3 x 10 (115 degrees) *   LAQ's 3x 10, 2#    Hamstring Curls 2 x 10, Green *    Quad sets  3 x 10 3" (0 degrees)    SAQ's 3 x 10, 2#    Supine heel slides  2 x 10 (114 degrees)    Straight leg raises  3 x 10    Side-lying hip abduction  2 x 10 *                Ther-Act     Heel Raises 3 x 10 *   Mini Squats 2 x 10    3 way hip     Stair knee flexion stretch 5 x 10"   Sit <> stands  2 x 10 No UE *    Forward Step ups  2 x 10    Lateral Step ups 2 x 10 *   Standing marching  2 x 10                               Patient declined:  cold pack for 8 minutes to R knee with elevation above heart level " .    Patient Education and Home Exercises     Home Exercises Provided and Patient Education Provided     Education provided:   - Plan of Care, Home exercise program, Delayed onset muscle soreness     Written Home Exercises Provided: Patient instructed to cont prior HEP. Exercises were reviewed and Cary was able to demonstrate them prior to the end of the session.  Cary demonstrated good  understanding of the education provided. See EMR under Patient Instructions for exercises provided during therapy sessions    ASSESSMENT   Cary is a 58 y.o. female referred to outpatient Physical Therapy with a medical diagnosis of R TKR. Patient presents with R knee pain, decreased R knee ROM, decreased R LE muscle strength and motor control. Patient's impairments are currently limiting her overall mobility and activity tolerance. Patient can benefit from skilled PT services to address listed impairments and promote return to PLOF and return to work. LPTA  provided pt with proper setup on NuStep to decrease stiffness and improve range of motion prior to tx session. Patient demonstrates good carryover of exercise instruction requiring only minimal cueing for form and speed with exercises. Patient occasionally requires verbal and visual cues to decrease speed of movement and for proper count, and hold times.  Noted improved Right knee active range of motion extension to flexion at end of physical therapy treatment session.     Patient prognosis is Good.   Patient will benefit from skilled outpatient Physical Therapy to address the deficits stated above and in the chart below, provide patient /family education, and to maximize patientt's level of independence.      Plan of care discussed with patient: Yes  Patient's spiritual, cultural and educational needs considered and patient is agreeable to the plan of care and goals as stated below:      Anticipated Barriers for therapy: N/A    Goals:  Short Term Goals: 2 weeks   Patient  will independently complete Home exercise program with correct form.   Patient will report decreased R knee pain to less than or equal to 3/10 for improved quality of life.   Patient will complete sit to stand transfer with no UE use for increased independence with function transfers.      Long Term Goals: 4 weeks   Pt will increase R knee flexion to 120 degrees, knee extension to 0 degrees, and quad lag to 0 degrees to allow patient normal gait pattern.  Pt will increase R knee strength to 4/5 to allow patient to safely return to prior level of function   Pt will report decrease in R knee pain to 2/10 with standing for greater than 30 minutes for increased activity tolerance.   Pt will ambulate community distance with increased gait speed to greater than or equal to 3.25 feet per second for increased safety with community ambulation.   Pt will complete 5 x sit<> less than or equal to 15 seconds indicating increased power for functional transfers  Pt will negotiate up and down stairs with reciprocal pattern for increased independence with community ambulation.        PLAN   Plan of care Certification: 3/12/2024 to 4/5/2024.     Outpatient Physical Therapy 3 times weekly for 4 weeks to include the following interventions: Electrical Stimulation unattended, Gait Training, Manual Therapy, Moist Heat/ Ice, Neuromuscular Re-ed, Patient Education, Therapeutic Activities, and Therapeutic Exercise.     Continue POC per PT orders to progress patient toward rehab goals.    VANNA Ceja     3/25/2024

## 2024-03-27 ENCOUNTER — CLINICAL SUPPORT (OUTPATIENT)
Dept: REHABILITATION | Facility: HOSPITAL | Age: 59
End: 2024-03-27
Payer: COMMERCIAL

## 2024-03-27 DIAGNOSIS — Z96.651 S/P TOTAL KNEE ARTHROPLASTY, RIGHT: Primary | ICD-10-CM

## 2024-03-27 PROCEDURE — 97530 THERAPEUTIC ACTIVITIES: CPT | Mod: CQ

## 2024-03-27 PROCEDURE — 97110 THERAPEUTIC EXERCISES: CPT | Mod: CQ

## 2024-03-27 NOTE — PROGRESS NOTES
"[facility]   Physical Therapy Treatment Note     Name: Cary Carbone  Clinic Number: [id number]    Therapy Diagnosis: R knee pain   Physician: Trish Goodwin FNP    Visit Date: 3/27/2024    Physician Orders: PT Eval and Treat   Medical Diagnosis from Referral: s/p TKR R   Evaluation Date: 3/12/2024  Authorization Period Expiration: 3/6/2025   Plan of Care Expiration: 4/5/2024  Progress Note Due: 4/5/2024  Date of Surgery: 2/21/2024  Visit # / Visits authorized: 8/12   FOTO: to be completed on visit 12     Precautions: Standard      Time In: 9:35  Time Out: 10:18  Total Billable Time: 43 minutes      PTA Visit #: 1/5      SUBJECTIVE     Pt reports: "I feel like something is sticking me in the front of my knee but it only hurts when I stand up."     She was compliant with home exercise program.  Response to previous treatment: N/A  Functional change: improved mobility with less pain.     Pain: 4/10  Location: right knee      OBJECTIVE     Objective Measures updated at progress report unless specified.     Treatment     Cary received the treatments listed below:      therapeutic exercises to develop strength, endurance, ROM, and flexibility.  See flow-sheet below.       Ther- Ex Reps   Nustep 8 minutes     Wedge 2 minute    Hamstring Stretch 2 x 20"    Heel slides (seated)  3 x 10 (115 degrees)    LAQ's 3x 10, 2#    Hamstring Curls 2 x 10, Green    Quad sets  3 x 10 3" (0 degrees)    SAQ's 3 x 10, 2#    Supine heel slides  2 x 10 (112 degrees)    Straight leg raises  3 x 10    Side-lying hip abduction  2 x 10                Ther-Act     Heel Raises 3 x 10    Mini Squats 2 x 10    3 way hip     Stair knee flexion stretch 5 x 10"   Sit <> stands  2 x 10 No UE     Forward Step ups  2 x 10    Lateral Step ups 2 x 10    Standing marching  2 x 10                               Patient declined:  cold pack for 8 minutes to R knee with elevation above heart level .    Patient Education and Home " Exercises     Home Exercises Provided and Patient Education Provided     Education provided:   - Plan of Care, Home exercise program, Delayed onset muscle soreness     Written Home Exercises Provided: Patient instructed to cont prior HEP. Exercises were reviewed and Cary was able to demonstrate them prior to the end of the session.  Cary demonstrated good  understanding of the education provided. See EMR under Patient Instructions for exercises provided during therapy sessions    ASSESSMENT   Cary is a 58 y.o. female referred to outpatient Physical Therapy with a medical diagnosis of R TKR. Patient presents with R knee pain, decreased R knee ROM, decreased R LE muscle strength and motor control. Patient's impairments are currently limiting her overall mobility and activity tolerance. Patient can benefit from skilled PT services to address listed impairments and promote return to PLOF and return to work. LPTA  provided pt with proper setup on NuStep to decrease stiffness and improve range of motion prior to tx session.  Pt occasionally requires verbal and visual cues to decrease speed of movement and for proper count, and hold times.  No adverse effects noted.    Patient prognosis is Good.   Patient will benefit from skilled outpatient Physical Therapy to address the deficits stated above and in the chart below, provide patient /family education, and to maximize patientt's level of independence.      Plan of care discussed with patient: Yes  Patient's spiritual, cultural and educational needs considered and patient is agreeable to the plan of care and goals as stated below:      Anticipated Barriers for therapy: N/A    Goals:  Short Term Goals: 2 weeks   Patient will independently complete Home exercise program with correct form.   Patient will report decreased R knee pain to less than or equal to 3/10 for improved quality of life.   Patient will complete sit to stand transfer with no UE use for increased  independence with function transfers.      Long Term Goals: 4 weeks   Pt will increase R knee flexion to 120 degrees, knee extension to 0 degrees, and quad lag to 0 degrees to allow patient normal gait pattern.  Pt will increase R knee strength to 4/5 to allow patient to safely return to prior level of function   Pt will report decrease in R knee pain to 2/10 with standing for greater than 30 minutes for increased activity tolerance.   Pt will ambulate community distance with increased gait speed to greater than or equal to 3.25 feet per second for increased safety with community ambulation.   Pt will complete 5 x sit<> less than or equal to 15 seconds indicating increased power for functional transfers  Pt will negotiate up and down stairs with reciprocal pattern for increased independence with community ambulation.        PLAN   Plan of care Certification: 3/12/2024 to 4/5/2024.     Outpatient Physical Therapy 3 times weekly for 4 weeks to include the following interventions: Electrical Stimulation unattended, Gait Training, Manual Therapy, Moist Heat/ Ice, Neuromuscular Re-ed, Patient Education, Therapeutic Activities, and Therapeutic Exercise.     Continue POC per PT orders to progress patient toward rehab goals.    VANNA Lipscomb   3/27/2024

## 2024-03-28 ENCOUNTER — CLINICAL SUPPORT (OUTPATIENT)
Dept: REHABILITATION | Facility: HOSPITAL | Age: 59
End: 2024-03-28
Payer: COMMERCIAL

## 2024-03-28 DIAGNOSIS — Z96.651 S/P TOTAL KNEE ARTHROPLASTY, RIGHT: Primary | ICD-10-CM

## 2024-03-28 PROCEDURE — 97110 THERAPEUTIC EXERCISES: CPT | Mod: CQ

## 2024-03-28 PROCEDURE — 97530 THERAPEUTIC ACTIVITIES: CPT | Mod: CQ

## 2024-03-28 NOTE — PROGRESS NOTES
"[facility]   Physical Therapy Treatment Note     Name: Cary Carbone  Clinic Number: [id number]    Therapy Diagnosis: R knee pain   Physician: Trish Goodwin FNP    Visit Date: 3/28/2024    Physician Orders: PT Eval and Treat   Medical Diagnosis from Referral: s/p TKR R   Evaluation Date: 3/12/2024  Authorization Period Expiration: 3/6/2025   Plan of Care Expiration: 4/5/2024  Progress Note Due: 4/5/2024  Date of Surgery: 2/21/2024  Visit # / Visits authorized: 9/12   FOTO: to be completed on visit 12     Precautions: Standard      Time In: 9:33  Time Out: 10:18  Total Billable Time: 45 minutes      PTA Visit #: 1/5      SUBJECTIVE     Pt reports: "I feel like something is sticking me in the front of my knee but it only hurts when I stand up."     She was compliant with home exercise program.  Response to previous treatment: N/A  Functional change: improved mobility with less pain.     Pain: 4/10  Location: right knee      OBJECTIVE     Objective Measures updated at progress report unless specified.     Treatment     Cary received the treatments listed below:      therapeutic exercises to develop strength, endurance, ROM, and flexibility.  See flow-sheet below.       Ther- Ex Reps   Nustep 8 minutes     Wedge 2 minute    Hamstring Stretch 2 x 20"    Heel slides (seated)  3 x 10 (115 degrees)    LAQ's 3x 10, 2#    Hamstring Curls 2 x 10, Green    Quad sets  3 x 10 3" (0 degrees)    SAQ's 3 x 10, 2#    Supine heel slides  2 x 10 (116 degrees)    Straight leg raises  3 x 10    Side-lying hip abduction  2 x 10                Ther-Act     Heel Raises 3 x 10    Mini Squats 2 x 10    3 way hip     Stair knee flexion stretch 5 x 10"   Sit <> stands  2 x 10 No UE     Forward Step ups  2 x 10    Lateral Step ups 2 x 10    Standing marching  2 x 10                               Patient declined:  cold pack for 8 minutes to R knee with elevation above heart level.    Patient Education and Home " Exercises     Home Exercises Provided and Patient Education Provided     Education provided:   - Plan of Care, Home exercise program, Delayed onset muscle soreness     Written Home Exercises Provided: Patient instructed to cont prior HEP. Exercises were reviewed and Cary was able to demonstrate them prior to the end of the session.  Cary demonstrated good  understanding of the education provided. See EMR under Patient Instructions for exercises provided during therapy sessions    ASSESSMENT   Cary is a 58 y.o. female referred to outpatient Physical Therapy with a medical diagnosis of R TKR. Patient presents with R knee pain, decreased R knee ROM, decreased R LE muscle strength and motor control. Patient's impairments are currently limiting her overall mobility and activity tolerance. Patient can benefit from skilled PT services to address listed impairments and promote return to PLOF and return to work. LPTA  provided pt with proper setup on rec bike to decrease stiffness and improve range of motion prior to tx session.  Pt occasionally requires verbal and visual cues to decrease speed of movement and for proper count, and hold times. Pt could benefit from increase in tx intensity at next tx session. Pt displayed increased right knee flexion range of motion.  No adverse effects noted.    Patient prognosis is Good.   Patient will benefit from skilled outpatient Physical Therapy to address the deficits stated above and in the chart below, provide patient /family education, and to maximize patientt's level of independence.      Plan of care discussed with patient: Yes  Patient's spiritual, cultural and educational needs considered and patient is agreeable to the plan of care and goals as stated below:      Anticipated Barriers for therapy: N/A    Goals:  Short Term Goals: 2 weeks   Patient will independently complete Home exercise program with correct form.   Patient will report decreased R knee pain to less than  or equal to 3/10 for improved quality of life.   Patient will complete sit to stand transfer with no UE use for increased independence with function transfers.      Long Term Goals: 4 weeks   Pt will increase R knee flexion to 120 degrees, knee extension to 0 degrees, and quad lag to 0 degrees to allow patient normal gait pattern.  Pt will increase R knee strength to 4/5 to allow patient to safely return to prior level of function   Pt will report decrease in R knee pain to 2/10 with standing for greater than 30 minutes for increased activity tolerance.   Pt will ambulate community distance with increased gait speed to greater than or equal to 3.25 feet per second for increased safety with community ambulation.   Pt will complete 5 x sit<> less than or equal to 15 seconds indicating increased power for functional transfers  Pt will negotiate up and down stairs with reciprocal pattern for increased independence with community ambulation.        PLAN   Plan of care Certification: 3/12/2024 to 4/5/2024.     Outpatient Physical Therapy 3 times weekly for 4 weeks to include the following interventions: Electrical Stimulation unattended, Gait Training, Manual Therapy, Moist Heat/ Ice, Neuromuscular Re-ed, Patient Education, Therapeutic Activities, and Therapeutic Exercise.     Continue POC per PT orders to progress patient toward rehab goals.    VANNA Lipscomb   3/28/2024

## 2024-04-01 ENCOUNTER — CLINICAL SUPPORT (OUTPATIENT)
Dept: REHABILITATION | Facility: HOSPITAL | Age: 59
End: 2024-04-01
Payer: COMMERCIAL

## 2024-04-01 DIAGNOSIS — Z96.651 S/P TOTAL KNEE ARTHROPLASTY, RIGHT: Primary | ICD-10-CM

## 2024-04-01 PROCEDURE — 97530 THERAPEUTIC ACTIVITIES: CPT

## 2024-04-01 PROCEDURE — 97110 THERAPEUTIC EXERCISES: CPT

## 2024-04-01 NOTE — PROGRESS NOTES
"[facility]   Physical Therapy Treatment Note     Name: Cary Carbone  Clinic Number: [id number]    Therapy Diagnosis: R knee pain   Physician: Trish Goodwin, AMANDA    Visit Date: 4/1/2024    Physician Orders: PT Eval and Treat   Medical Diagnosis from Referral: s/p TKR R   Evaluation Date: 3/12/2024  Authorization Period Expiration: 3/6/2025   Plan of Care Expiration: 4/5/2024  Progress Note Due: 4/5/2024  Date of Surgery: 2/21/2024  Visit # / Visits authorized: 10/12   FOTO: to be completed on visit 12     Precautions: Standard      Time In: 9:28  Time Out: 10:11  Total Billable Time: 43 minutes      PTA Visit #: 0/5      SUBJECTIVE     Pt reports: "This other knee has done started aching too."     She was compliant with home exercise program.  Response to previous treatment: N/A  Functional change: improved mobility with less pain.     Pain: 0 /10  Location: right knee      OBJECTIVE     Objective Measures updated at progress report unless specified.     Treatment     Cary received the treatments listed below:      therapeutic exercises to develop strength, endurance, ROM, and flexibility.  See flow-sheet below.       Ther- Ex Reps   Nustep 8 minutes     Wedge 2 minute    Hamstring Stretch 2 x 20"    LAQ's 3x 10, 2#    Hamstring Curls 3 x 10, Green    Quad sets  3 x 10 3" (6 degrees)    SAQ's 3 x 10, 2#    Supine heel slides  2 x 10 (120 degrees)    Straight leg raises  3 x 10    Side-lying hip abduction  2 x 10                Ther-Act     Heel Raises 3 x 10    Mini Squats 2 x 10    3 way hip     Stair knee flexion stretch 5 x 10"   Sit <> stands  2 x 10 No UE     Forward Step ups  2 x 10 high step    Lateral Step ups 2 x 10 high step    Standing marching  2 x 10                                 Patient Education and Home Exercises     Home Exercises Provided and Patient Education Provided     Education provided:   - Plan of Care, Home exercise program, Delayed onset muscle soreness "     Written Home Exercises Provided: Patient instructed to cont prior HEP. Exercises were reviewed and Cary was able to demonstrate them prior to the end of the session.  Cary demonstrated good  understanding of the education provided. See EMR under Patient Instructions for exercises provided during therapy sessions    ASSESSMENT   Cary is a 58 y.o. female referred to outpatient Physical Therapy with a medical diagnosis of R TKR. Patient presents with R knee pain, decreased R knee ROM, decreased R LE muscle strength and motor control. Patient's impairments are currently limiting her overall mobility and activity tolerance. Patient can benefit from skilled PT services to address listed impairments and promote return to PLOF and return to work. PT progressed patient to warming up on rec bike with proper positioning to promote increased R knee flexion active range of motion prior to exercises. Patient required verbal and visual cueing throughout session for improved form and control to increase effectiveness of exercises. Patient continues to lack R quad control resulting in need for increased tactile cues for straight leg raises and sidelying hip ABD exercises. No adverse effects noted to PT treatment.     Patient prognosis is Good.   Patient will benefit from skilled outpatient Physical Therapy to address the deficits stated above and in the chart below, provide patient /family education, and to maximize patientt's level of independence.      Plan of care discussed with patient: Yes  Patient's spiritual, cultural and educational needs considered and patient is agreeable to the plan of care and goals as stated below:      Anticipated Barriers for therapy: N/A    Goals:  Short Term Goals: 2 weeks   Patient will independently complete Home exercise program with correct form.   Patient will report decreased R knee pain to less than or equal to 3/10 for improved quality of life.   Patient will complete sit to stand  transfer with no UE use for increased independence with function transfers.      Long Term Goals: 4 weeks   Pt will increase R knee flexion to 120 degrees, knee extension to 0 degrees, and quad lag to 0 degrees to allow patient normal gait pattern.  Pt will increase R knee strength to 4/5 to allow patient to safely return to prior level of function   Pt will report decrease in R knee pain to 2/10 with standing for greater than 30 minutes for increased activity tolerance.   Pt will ambulate community distance with increased gait speed to greater than or equal to 3.25 feet per second for increased safety with community ambulation.   Pt will complete 5 x sit<> less than or equal to 15 seconds indicating increased power for functional transfers  Pt will negotiate up and down stairs with reciprocal pattern for increased independence with community ambulation.        PLAN   Plan of care Certification: 3/12/2024 to 4/5/2024.     Outpatient Physical Therapy 3 times weekly for 4 weeks to include the following interventions: Electrical Stimulation unattended, Gait Training, Manual Therapy, Moist Heat/ Ice, Neuromuscular Re-ed, Patient Education, Therapeutic Activities, and Therapeutic Exercise.     Continue POC per PT orders to progress patient toward rehab goals.    Caleb German, PT, DPT     4/1/2024

## 2024-04-03 ENCOUNTER — CLINICAL SUPPORT (OUTPATIENT)
Dept: REHABILITATION | Facility: HOSPITAL | Age: 59
End: 2024-04-03
Payer: COMMERCIAL

## 2024-04-03 DIAGNOSIS — Z96.651 S/P TOTAL KNEE ARTHROPLASTY, RIGHT: Primary | ICD-10-CM

## 2024-04-03 PROCEDURE — 97530 THERAPEUTIC ACTIVITIES: CPT | Mod: CQ

## 2024-04-03 PROCEDURE — 97110 THERAPEUTIC EXERCISES: CPT | Mod: CQ

## 2024-04-03 NOTE — PROGRESS NOTES
"[facility]   Physical Therapy Treatment Note     Name: Cary Carbone  Clinic Number: [id number]    Therapy Diagnosis: R knee pain   Physician: Trish Goodwin FNP    Visit Date: 4/3/2024    Physician Orders: PT Eval and Treat   Medical Diagnosis from Referral: s/p TKR R   Evaluation Date: 3/12/2024  Authorization Period Expiration: 3/6/2025   Plan of Care Expiration: 4/5/2024  Progress Note Due: 4/5/2024  Date of Surgery: 2/21/2024  Visit # / Visits authorized: 11/12   FOTO: to be completed on visit 12     Precautions: Standard      Time In: 9:28  Time Out: 10:20  Total Billable Time: 52 minutes      PTA Visit #: 1/5      SUBJECTIVE     Pt reports: "I keep limping cause I'm use to using this cane"     She was compliant with home exercise program.  Response to previous treatment: N/A  Functional change: improved mobility with less pain.     Pain: 0 /10  Location: right knee      OBJECTIVE     Objective Measures updated at progress report unless specified.     Treatment     Cary received the treatments listed below:      therapeutic exercises to develop strength, endurance, ROM, and flexibility.  See flow-sheet below.       Ther- Ex Reps   Nustep 8 minutes     Wedge 2 minute    Hamstring Stretch 2 x 20"    LAQ's 3x 10, 2#    Hamstring Curls 3 x 10, Green    Quad sets  3 x 10 3" (6 degrees)    SAQ's 3 x 10, 2#    Supine heel slides  2 x 10 (120 degrees)    Straight leg raises  3 x 10    Side-lying hip abduction  2 x 10                Ther-Act     Heel Raises 3 x 10    Mini Squats 2 x 10    3 way hip     Stair knee flexion stretch 5 x 10"   Sit <> stands  2 x 10 No UE     Forward Step ups 2 x 10 high step    Lateral Step ups 2 x 10 high step    Standing marching  2 x 10                                 Patient Education and Home Exercises     Home Exercises Provided and Patient Education Provided     Education provided:   - Plan of Care, Home exercise program, Delayed onset muscle soreness "     Written Home Exercises Provided: Patient instructed to cont prior HEP. Exercises were reviewed and Cary was able to demonstrate them prior to the end of the session.  Cary demonstrated good  understanding of the education provided. See EMR under Patient Instructions for exercises provided during therapy sessions    ASSESSMENT   Cary is a 58 y.o. female referred to outpatient Physical Therapy with a medical diagnosis of R TKR. Patient presents with R knee pain, decreased R knee ROM, decreased R LE muscle strength and motor control. Patient's impairments are currently limiting her overall mobility and activity tolerance. Patient can benefit from skilled PT services to address listed impairments and promote return to PLOF and return to work. LPTA progressed pt to warming up on rec bike with proper positioning to promote increased R knee flexion active range of motion prior to exercises. Patient required verbal and visual cueing throughout session for improved form and control to increase effectiveness of exercises. Pt required occasional rest breaks during standing activities secondary to muscle fatigue.  Pt displays some improvement in right knee extension. No adverse effects noted.     Patient prognosis is Good.   Patient will benefit from skilled outpatient Physical Therapy to address the deficits stated above and in the chart below, provide patient /family education, and to maximize patientt's level of independence.      Plan of care discussed with patient: Yes  Patient's spiritual, cultural and educational needs considered and patient is agreeable to the plan of care and goals as stated below:      Anticipated Barriers for therapy: N/A    Goals:  Short Term Goals: 2 weeks   Patient will independently complete Home exercise program with correct form.   Patient will report decreased R knee pain to less than or equal to 3/10 for improved quality of life.   Patient will complete sit to stand transfer with no  UE use for increased independence with function transfers.      Long Term Goals: 4 weeks   Pt will increase R knee flexion to 120 degrees, knee extension to 0 degrees, and quad lag to 0 degrees to allow patient normal gait pattern.  Pt will increase R knee strength to 4/5 to allow patient to safely return to prior level of function   Pt will report decrease in R knee pain to 2/10 with standing for greater than 30 minutes for increased activity tolerance.   Pt will ambulate community distance with increased gait speed to greater than or equal to 3.25 feet per second for increased safety with community ambulation.   Pt will complete 5 x sit<> less than or equal to 15 seconds indicating increased power for functional transfers  Pt will negotiate up and down stairs with reciprocal pattern for increased independence with community ambulation.        PLAN   Plan of care Certification: 3/12/2024 to 4/5/2024.     Outpatient Physical Therapy 3 times weekly for 4 weeks to include the following interventions: Electrical Stimulation unattended, Gait Training, Manual Therapy, Moist Heat/ Ice, Neuromuscular Re-ed, Patient Education, Therapeutic Activities, and Therapeutic Exercise.     Continue POC per PT orders to progress patient toward rehab goals.    VANNA Lipscomb  4/3/2024

## 2024-04-04 ENCOUNTER — OFFICE VISIT (OUTPATIENT)
Dept: ORTHOPEDICS | Facility: CLINIC | Age: 59
End: 2024-04-04
Payer: COMMERCIAL

## 2024-04-04 ENCOUNTER — HOSPITAL ENCOUNTER (OUTPATIENT)
Dept: RADIOLOGY | Facility: HOSPITAL | Age: 59
Discharge: HOME | End: 2024-04-04
Attending: ORTHOPAEDIC SURGERY
Payer: COMMERCIAL

## 2024-04-04 DIAGNOSIS — Z96.651 S/P TOTAL KNEE ARTHROPLASTY, RIGHT: Primary | ICD-10-CM

## 2024-04-04 DIAGNOSIS — Z96.651 S/P TOTAL KNEE ARTHROPLASTY, RIGHT: ICD-10-CM

## 2024-04-04 PROCEDURE — 73562 X-RAY EXAM OF KNEE 3: CPT | Mod: 26,RT,, | Performed by: ORTHOPAEDIC SURGERY

## 2024-04-04 PROCEDURE — 73562 X-RAY EXAM OF KNEE 3: CPT | Mod: TC,RT

## 2024-04-04 PROCEDURE — 99212 OFFICE O/P EST SF 10 MIN: CPT | Mod: PBBFAC,25 | Performed by: ORTHOPAEDIC SURGERY

## 2024-04-04 PROCEDURE — 99024 POSTOP FOLLOW-UP VISIT: CPT | Mod: ,,, | Performed by: ORTHOPAEDIC SURGERY

## 2024-04-04 NOTE — PROGRESS NOTES
Post-Operative Total Knee        Robotic Arthroplasty, Knee, Total - Right 2/21/2024      Pt is here today for Second post-operative followup of her Robotic Arthroplasty, Knee, Total - Right.  she is doing well.  We have reviewed her findings and discussed plan of care and future treatment options, including the physical therapy plan.    Patient is 6 weeks post surgery.    Physical Exam:     The affected knee was inspected today.   The wound is healing well with no signs of erythema or warmth.  There is no drainage.  No clinical signs or symptoms of infection are present.   ROM: 2-118  -Monik's sign.  2+ pulses are present.       X-Ray Knee 3 View Right    Result Date: 4/4/2024  See Procedure Notes for results. IMPRESSION: Please see Ortho procedure notes for report.  This procedure was auto-finalized by: Virtual Radiologist   Three views of the right knee were obtained today demonstrating a cementless total knee arthroplasty in excellent position  Assessment and Plan  1. S/P total knee arthroplasty, right  - Ambulatory referral/consult to Physical/Occupational Therapy; Future            We will continue post-operative physical therapy until the patient feels that they are independent with a home rehab program.  Return to work status/ return to activities status was discussed today in detail. All questions were answered.    The use of stockings and DVT prophylaxis was discussed.  Will follow up in additional 4-6 weeks with radiographs at that time.     There are no Patient Instructions on file for this visit.

## 2024-04-05 ENCOUNTER — CLINICAL SUPPORT (OUTPATIENT)
Dept: REHABILITATION | Facility: HOSPITAL | Age: 59
End: 2024-04-05
Payer: COMMERCIAL

## 2024-04-05 DIAGNOSIS — Z96.651 S/P TOTAL KNEE ARTHROPLASTY, RIGHT: Primary | ICD-10-CM

## 2024-04-05 PROCEDURE — 97110 THERAPEUTIC EXERCISES: CPT | Mod: CQ

## 2024-04-05 PROCEDURE — 97530 THERAPEUTIC ACTIVITIES: CPT | Mod: CQ

## 2024-04-05 NOTE — PROGRESS NOTES
"[facility]   Physical Therapy Treatment Note     Name: Cary Carbone  Clinic Number: [id number]    Therapy Diagnosis: R knee pain   Physician: Trish Goodwin FNP    Visit Date: 4/5/2024    Physician Orders: PT Eval and Treat   Medical Diagnosis from Referral: s/p TKR R   Evaluation Date: 3/12/2024  Authorization Period Expiration: 3/6/2025   Plan of Care Expiration: 4/5/2024  Progress Note Due: 4/5/2024  Date of Surgery: 2/21/2024  Visit # / Visits authorized: 12/20  FOTO: to be completed on visit 13     Precautions: Standard      Time In: 9:30  Time Out: 10:18  Total Billable Time: 48     PTA Visit #: 2/5      SUBJECTIVE     Pt reports: The doctor said my knee still has a good bit of swelling in it, and he wants me to keep coming to physical therapy".     She was compliant with home exercise program.  Response to previous treatment: N/A  Functional change: improved mobility with less pain.     Pain: 0 /10  Location: right knee      OBJECTIVE     Objective Measures updated at progress report unless specified.     Treatment     Cary received the treatments listed below:      therapeutic exercises to develop strength, endurance, ROM, and flexibility.  See flow-sheet below.       Ther- Ex Reps   Nustep 8 minutes     Wedge 2 minute    Hamstring Stretch 2 x 20"    LAQ's 3x 10, 2#    Hamstring Curls 3 x 10, Green    Quad sets  3 x 10 3" (6 degrees)    SAQ's 3 x 10, 2#    Supine heel slides  2 x 10 (120 degrees)    Straight leg raises  3 x 10    Side-lying hip abduction  2 x 10                Ther-Act     Heel Raises 3 x 10    Mini Squats 2 x 10    3 way hip     Stair knee flexion stretch 5 x 10"   Sit <> stands  2 x 10 No UE     Forward Step ups 2 x 10 high step    Lateral Step ups 2 x 10 high step    Standing marching  2 x 10                                 Patient Education and Home Exercises     Home Exercises Provided and Patient Education Provided     Education provided:   - Plan of " Care, Home exercise program, Delayed onset muscle soreness     Written Home Exercises Provided: Patient instructed to cont prior HEP. Exercises were reviewed and Cary was able to demonstrate them prior to the end of the session.  Cary demonstrated good  understanding of the education provided. See EMR under Patient Instructions for exercises provided during therapy sessions    ASSESSMENT   Cary is a 58 y.o. female referred to outpatient Physical Therapy with a medical diagnosis of R TKR. Patient presents with R knee pain, decreased R knee ROM, decreased R LE muscle strength and motor control. Patient's impairments are currently limiting her overall mobility and activity tolerance. Patient can benefit from skilled PT services to address listed impairments and promote return to PLOF and return to work. LPTA progressed pt to warming up on rec bike with proper positioning to promote increased R knee flexion active range of motion prior to exercises. Patient required verbal and visual cueing throughout session for improved form and control to increase effectiveness of exercises.  Patient's physical therapy goals assessed by supervising physical therapist Caleb German DPT with plan to re-certify patient.       Patient prognosis is Good.   Patient will benefit from skilled outpatient Physical Therapy to address the deficits stated above and in the chart below, provide patient /family education, and to maximize patientt's level of independence.      Plan of care discussed with patient: Yes  Patient's spiritual, cultural and educational needs considered and patient is agreeable to the plan of care and goals as stated below:      Anticipated Barriers for therapy: N/A    Goals:  Short Term Goals: 2 weeks   Patient will independently complete Home exercise program with correct form.   Patient will report decreased R knee pain to less than or equal to 3/10 for improved quality of life.   Patient will complete sit to  stand transfer with no UE use for increased independence with function transfers.      Long Term Goals: 4 weeks   Pt will increase R knee flexion to 120 degrees, knee extension to 0 degrees, and quad lag to 0 degrees to allow patient normal gait pattern.  Pt will increase R knee strength to 4/5 to allow patient to safely return to prior level of function   Pt will report decrease in R knee pain to 2/10 with standing for greater than 30 minutes for increased activity tolerance.   Pt will ambulate community distance with increased gait speed to greater than or equal to 3.25 feet per second for increased safety with community ambulation.   Pt will complete 5 x sit<> less than or equal to 15 seconds indicating increased power for functional transfers  Pt will negotiate up and down stairs with reciprocal pattern for increased independence with community ambulation.        PLAN   Plan of care Certification: 3/12/2024 to 4/5/2024.     Outpatient Physical Therapy 3 times weekly for 4 weeks to include the following interventions: Electrical Stimulation unattended, Gait Training, Manual Therapy, Moist Heat/ Ice, Neuromuscular Re-ed, Patient Education, Therapeutic Activities, and Therapeutic Exercise.     Plan to recertify patient per PT order to continue to progress toward physical therapy goals.   VANNA Ceja   4/5/2024

## 2024-04-05 NOTE — PLAN OF CARE
"[facility]  Physical Therapy Plan of Care Note     Name: Cary Carbone  Clinic Number: [id number]    Therapy Diagnosis:R knee pain   Physician: Trish Goodwin FNP    Visit Date: 4/5/2024    Physician Orders: PT Eval and Treat   Medical Diagnosis from Referral: s/p TKR R   Evaluation Date: 3/12/2024  Authorization Period Expiration: 3/6/2025   Plan of Care Expiration: 4/5/2024  Progress Note Due: 4/5/2024  Date of Surgery: 2/21/2024  Visit # / Visits authorized: 12/12   FOTO: to be completed on visit 12    Precautions: Standard  Functional Level Prior to Evaluation:  Independent     Subjective     Update: "I am trying to work on getting this knee straight when I am walking."     Objective      Update: Patient continues to have deficits in R knee strength and motor control resulting in decreased stability and continued gait deficits.    Assessment     Update: Patient can benefit form continued skilled PT services to continue increasing R LE strength and motor control to continue progress toward rehab goals and promote return to PLOF and mobility.     Previous Short Term Goals Status:     Patient will independently complete Home exercise program with correct form. -MET   Patient will report decreased R knee pain to less than or equal to 3/10 for improved quality of life. -MET   Patient will complete sit to stand transfer with no UE use for increased independence with function transfers. -MET   Long Term Goal Status: continue per initial plan of care.  Reasons for Recertification of Therapy:   continue progressing toward rehab goals    GOALS  Pt will increase R knee flexion to 120 degrees, knee extension to 0 degrees, and quad lag to 0 degrees to allow patient normal gait pattern.-Goal in progress 6-120 degrees   Pt will increase R knee strength to 4/5 to allow patient to safely return to prior level of function -Goal in progress  Pt will report decrease in R knee pain to 2/10 with " standing for greater than 30 minutes for increased activity tolerance. -MET   Pt will ambulate community distance with increased gait speed to greater than or equal to 3.25 feet per second for increased safety with community ambulation. -Goal in progress; 2.70 feet per second   Pt will complete 5 x sit<> less than or equal to 15 seconds indicating increased power for functional transfers-MET; 12 seconds   Pt will negotiate up and down stairs with reciprocal pattern for increased independence with community ambulation. -Goal in progress     Plan   Updated Certification Period: 4/5/2024 to 5/7/2024   Recommended Treatment Plan: 2 times per week for 4 weeks:  Electrical Stimulation unattended, Gait Training, Manual Therapy, Moist Heat/ Ice, Neuromuscular Re-ed, Patient Education, Therapeutic Activities, and Therapeutic Exercise    Caleb German, PT, DPT

## 2024-04-08 ENCOUNTER — CLINICAL SUPPORT (OUTPATIENT)
Dept: REHABILITATION | Facility: HOSPITAL | Age: 59
End: 2024-04-08
Payer: COMMERCIAL

## 2024-04-08 DIAGNOSIS — Z96.651 S/P TOTAL KNEE ARTHROPLASTY, RIGHT: Primary | ICD-10-CM

## 2024-04-08 PROCEDURE — 97110 THERAPEUTIC EXERCISES: CPT | Mod: CQ

## 2024-04-08 PROCEDURE — 97530 THERAPEUTIC ACTIVITIES: CPT | Mod: CQ

## 2024-04-08 NOTE — PROGRESS NOTES
"[facility]   Physical Therapy Treatment Note     Name: Cary Carbone  Clinic Number: [id number]    Therapy Diagnosis: R knee pain   Physician: Trish Goodwin FNP    Visit Date: 4/8/2024    Physician Orders: PT Eval and Treat   Medical Diagnosis from Referral: s/p TKR R   Evaluation Date: 3/12/2024  Authorization Period Expiration: 3/6/2025   Plan of Care Expiration: 4/5/2024  Progress Note Due: 4/5/2024  Date of Surgery: 2/21/2024  Visit # / Visits authorized: 13/20  FOTO: to be completed on visit 14     Precautions: Standard      Time In: 14:45  Time Out: 15:30  Total Billable Time:  45 minutes     PTA Visit #: 2/5      SUBJECTIVE     Pt reports: The doctor said my knee still has a good bit of swelling in it, and he wants me to keep coming to physical therapy".     She was compliant with home exercise program.  Response to previous treatment: N/A  Functional change: improved mobility with less pain.     Pain: 0 /10  Location: right knee      OBJECTIVE     Objective Measures updated at progress report unless specified.     Treatment     Cary received the treatments listed below:      therapeutic exercises to develop strength, endurance, ROM, and flexibility.  See flow-sheet below.       Ther- Ex Reps   Nustep 8 minutes     Wedge 2 minute    Hamstring Stretch 2 x 20"    LAQ's 3x 10, 2#    Hamstring Curls 3 x 10, Green    Quad sets  3 x 10 3" (6 degrees)    SAQ's 3 x 10, 2#    Supine heel slides  2 x 10 (120 degrees)    Straight leg raises  3 x 10    Side-lying hip abduction  2 x 10                Ther-Act     Heel Raises 3 x 10    Mini Squats 2 x 10    3 way hip     Stair knee flexion stretch 5 x 10"   Sit <> stands  2 x 10 No UE     Forward Step ups 2 x 10 high step    Lateral Step ups 2 x 10 high step    Standing marching  2 x 10                                 Patient Education and Home Exercises     Home Exercises Provided and Patient Education Provided     Education provided:   - " Plan of Care, Home exercise program, Delayed onset muscle soreness     Written Home Exercises Provided: Patient instructed to cont prior HEP. Exercises were reviewed and Cary was able to demonstrate them prior to the end of the session.  Cary demonstrated good  understanding of the education provided. See EMR under Patient Instructions for exercises provided during therapy sessions    ASSESSMENT   Cary is a 58 y.o. female referred to outpatient Physical Therapy with a medical diagnosis of R TKR. Patient presents with R knee pain, decreased R knee ROM, decreased R LE muscle strength and motor control. Patient's impairments are currently limiting her overall mobility and activity tolerance. Patient can benefit from skilled PT services to address listed impairments and promote return to PLOF and return to work. LPTA progressed pt to warming up on rec bike with proper positioning to promote increased R knee flexion active range of motion prior to exercises. Patient required manual Right knee extension stretch due to tightness in Right Hamstring.  Patient demonstrates slight antalgic gait during ambulation, but is able to correct with verbal and visual cues. No adverse effects noted during PT treatment session.      Patient prognosis is Good.   Patient will benefit from skilled outpatient Physical Therapy to address the deficits stated above and in the chart below, provide patient /family education, and to maximize patientt's level of independence.      Plan of care discussed with patient: Yes  Patient's spiritual, cultural and educational needs considered and patient is agreeable to the plan of care and goals as stated below:      Anticipated Barriers for therapy: N/A    Goals:  Short Term Goals: 2 weeks   Patient will independently complete Home exercise program with correct form.   Patient will report decreased R knee pain to less than or equal to 3/10 for improved quality of life.   Patient will complete sit to  stand transfer with no UE use for increased independence with function transfers.      Long Term Goals: 4 weeks   Pt will increase R knee flexion to 120 degrees, knee extension to 0 degrees, and quad lag to 0 degrees to allow patient normal gait pattern.  Pt will increase R knee strength to 4/5 to allow patient to safely return to prior level of function   Pt will report decrease in R knee pain to 2/10 with standing for greater than 30 minutes for increased activity tolerance.   Pt will ambulate community distance with increased gait speed to greater than or equal to 3.25 feet per second for increased safety with community ambulation.   Pt will complete 5 x sit<> less than or equal to 15 seconds indicating increased power for functional transfers  Pt will negotiate up and down stairs with reciprocal pattern for increased independence with community ambulation.        PLAN   Plan of care Certification: 3/12/2024 to 4/5/2024.     Outpatient Physical Therapy 3 times weekly for 4 weeks to include the following interventions: Electrical Stimulation unattended, Gait Training, Manual Therapy, Moist Heat/ Ice, Neuromuscular Re-ed, Patient Education, Therapeutic Activities, and Therapeutic Exercise.     Plan to recertify patient per PT order to continue to progress toward physical therapy goals.   VANNA Ceja   4/8/2024

## 2024-04-09 ENCOUNTER — OFFICE VISIT (OUTPATIENT)
Dept: PRIMARY CARE CLINIC | Facility: CLINIC | Age: 59
End: 2024-04-09
Payer: COMMERCIAL

## 2024-04-09 VITALS
DIASTOLIC BLOOD PRESSURE: 84 MMHG | BODY MASS INDEX: 32.3 KG/M2 | HEART RATE: 78 BPM | TEMPERATURE: 98 F | OXYGEN SATURATION: 96 % | SYSTOLIC BLOOD PRESSURE: 114 MMHG | RESPIRATION RATE: 18 BRPM | WEIGHT: 201 LBS | HEIGHT: 66 IN

## 2024-04-09 DIAGNOSIS — R11.10 VOMITING, UNSPECIFIED VOMITING TYPE, UNSPECIFIED WHETHER NAUSEA PRESENT: Primary | ICD-10-CM

## 2024-04-09 PROCEDURE — 1160F RVW MEDS BY RX/DR IN RCRD: CPT | Mod: ,,, | Performed by: NURSE PRACTITIONER

## 2024-04-09 PROCEDURE — 1159F MED LIST DOCD IN RCRD: CPT | Mod: ,,, | Performed by: NURSE PRACTITIONER

## 2024-04-09 PROCEDURE — 3008F BODY MASS INDEX DOCD: CPT | Mod: ,,, | Performed by: NURSE PRACTITIONER

## 2024-04-09 PROCEDURE — 96372 THER/PROPH/DIAG INJ SC/IM: CPT | Mod: ,,, | Performed by: NURSE PRACTITIONER

## 2024-04-09 PROCEDURE — 3079F DIAST BP 80-89 MM HG: CPT | Mod: ,,, | Performed by: NURSE PRACTITIONER

## 2024-04-09 PROCEDURE — 99213 OFFICE O/P EST LOW 20 MIN: CPT | Mod: 25,,, | Performed by: NURSE PRACTITIONER

## 2024-04-09 PROCEDURE — 3074F SYST BP LT 130 MM HG: CPT | Mod: ,,, | Performed by: NURSE PRACTITIONER

## 2024-04-09 RX ORDER — PROMETHAZINE HYDROCHLORIDE 25 MG/1
25 TABLET ORAL EVERY 4 HOURS
Qty: 15 TABLET | Refills: 0 | Status: SHIPPED | OUTPATIENT
Start: 2024-04-09

## 2024-04-09 RX ORDER — ONDANSETRON 2 MG/ML
4 INJECTION INTRAMUSCULAR; INTRAVENOUS
Status: COMPLETED | OUTPATIENT
Start: 2024-04-09 | End: 2024-04-09

## 2024-04-09 RX ORDER — ONDANSETRON 4 MG/1
4 TABLET, ORALLY DISINTEGRATING ORAL 2 TIMES DAILY
Qty: 15 TABLET | Refills: 0 | Status: SHIPPED | OUTPATIENT
Start: 2024-04-09 | End: 2024-04-09

## 2024-04-09 RX ORDER — ONDANSETRON 4 MG/1
4 TABLET, ORALLY DISINTEGRATING ORAL 2 TIMES DAILY
Qty: 15 TABLET | Refills: 0 | Status: SHIPPED | OUTPATIENT
Start: 2024-04-09

## 2024-04-09 RX ORDER — ONDANSETRON 2 MG/ML
4 INJECTION INTRAMUSCULAR; INTRAVENOUS
Status: DISCONTINUED | OUTPATIENT
Start: 2024-04-09 | End: 2024-04-09

## 2024-04-09 RX ADMIN — ONDANSETRON 4 MG: 2 INJECTION INTRAMUSCULAR; INTRAVENOUS at 09:04

## 2024-04-09 NOTE — PROGRESS NOTES
Subjective     Patient ID: Cary Carbone is a 58 y.o. female.    Chief Complaint: Vomiting (Since Sunday after eating/)    Pt presents with nausea and vomiting off and on since Sunday.       Review of Systems   Constitutional:  Negative for activity change, appetite change, chills, fatigue and fever.   HENT:  Negative for nasal congestion, ear discharge, nosebleeds, postnasal drip, rhinorrhea, sinus pressure/congestion, sneezing, sore throat and tinnitus.    Eyes:  Negative for pain, discharge, redness and itching.   Respiratory:  Negative for cough, choking, chest tightness, shortness of breath and wheezing.    Cardiovascular:  Negative for chest pain.   Gastrointestinal:  Positive for nausea and vomiting. Negative for abdominal distention, abdominal pain, blood in stool, change in bowel habit, constipation and diarrhea.   Genitourinary:  Negative for decreased urine volume, dysuria, flank pain and frequency.   Musculoskeletal:  Negative for back pain and gait problem.   Integumentary:  Negative for wound, breast mass and breast discharge.   Allergic/Immunologic: Negative for immunocompromised state.   Neurological:  Negative for dizziness, light-headedness and headaches.   Psychiatric/Behavioral:  Negative for agitation, behavioral problems and hallucinations.    Breast: Negative for mass         Objective     Physical Exam  Vitals and nursing note reviewed.   Constitutional:       Appearance: Normal appearance.   Cardiovascular:      Rate and Rhythm: Normal rate and regular rhythm.      Heart sounds: Normal heart sounds.   Pulmonary:      Effort: Pulmonary effort is normal.      Breath sounds: Normal breath sounds.   Abdominal:      General: Bowel sounds are normal. There is no distension.      Tenderness: There is no abdominal tenderness. There is no guarding.   Musculoskeletal:         General: Normal range of motion.   Neurological:      Mental Status: She is alert and oriented to person, place, and time.    Psychiatric:         Mood and Affect: Mood normal.         Behavior: Behavior normal.            Assessment and Plan     1. Vomiting, unspecified vomiting type, unspecified whether nausea present  -     Discontinue: ondansetron injection 4 mg  -     Discontinue: ondansetron (ZOFRAN-ODT) 4 MG TbDL; Take 1 tablet (4 mg total) by mouth 2 (two) times daily.  Dispense: 15 tablet; Refill: 0  -     promethazine (PHENERGAN) 25 MG tablet; Take 1 tablet (25 mg total) by mouth every 4 (four) hours.  Dispense: 15 tablet; Refill: 0  -     ondansetron injection 4 mg  -     ondansetron (ZOFRAN-ODT) 4 MG TbDL; Take 1 tablet (4 mg total) by mouth 2 (two) times daily.  Dispense: 15 tablet; Refill: 0        Drink plenty of fluids. Notify clinic if symptoms worsen or persist.          Follow up if symptoms worsen or fail to improve.

## 2024-04-09 NOTE — LETTER
April 9, 2024      [facility]  [address] [facility] [address]  Phone: [phone]  Fax: [phone]       Patient: Cary Carbone   YOB: 1965  Date of Visit: 04/09/2024    To Whom It May Concern:    Dona Carbone  was at [facility] on 04/09/2024. The patient may return to work/school on 4/10/2024 with no restrictions. If you have any questions or concerns, or if I can be of further assistance, please do not hesitate to contact me.    Sincerely,    KOFI KimP

## 2024-04-10 ENCOUNTER — CLINICAL SUPPORT (OUTPATIENT)
Dept: REHABILITATION | Facility: HOSPITAL | Age: 59
End: 2024-04-10
Payer: COMMERCIAL

## 2024-04-10 DIAGNOSIS — Z96.651 S/P TOTAL KNEE ARTHROPLASTY, RIGHT: Primary | ICD-10-CM

## 2024-04-10 PROCEDURE — 97530 THERAPEUTIC ACTIVITIES: CPT | Mod: CQ

## 2024-04-10 PROCEDURE — 97110 THERAPEUTIC EXERCISES: CPT | Mod: CQ

## 2024-04-10 PROCEDURE — 97010 HOT OR COLD PACKS THERAPY: CPT | Mod: CQ

## 2024-04-10 NOTE — PROGRESS NOTES
"[facility]   Physical Therapy Treatment Note     Name: Cary Carbone  Clinic Number: [id number]    Therapy Diagnosis: R knee pain   Physician: Trish Goodwin FNP    Visit Date: 4/10/2024    Physician Orders: PT Eval and Treat   Medical Diagnosis from Referral: s/p TKR R   Evaluation Date: 3/12/2024  Authorization Period Expiration: 3/6/2025   Plan of Care Expiration: 4/5/2024  Progress Note Due: 4/5/2024  Date of Surgery: 2/21/2024  Visit # / Visits authorized: 14/20  FOTO: to be completed on visit 14     Precautions: Standard      Time In: 8:50  Time Out: 9:44   Total Billable Time:  54 minutes (8 minutes not billable)    PTA Visit #: 4/5      SUBJECTIVE     Pt reports: "I'm just still trying to get the swelling out of this knee".     She was compliant with home exercise program.  Response to previous treatment: N/A  Functional change: improved mobility with less pain.     Pain: 0 /10  Location: right knee      OBJECTIVE     Objective Measures updated at progress report unless specified.     Treatment     Cary received the treatments listed below:      therapeutic exercises to develop strength, endurance, ROM, and flexibility.  See flow-sheet below.       Ther- Ex Reps   Nustep 8 minutes     Wedge 2 minute    Hamstring Stretch 2 x 20"    LAQ's 3x 10, 2#    Hamstring Curls 3 x 10, Green    Quad sets  3 x 10 3" (4 degrees)    SAQ's 3 x 10, 2#    Supine heel slides  2 x 10 (115 degrees)    4 way hip  2 x 10                    Ther-Act     Heel Raises 3 x 10    Mini Squats 2 x 10    3 way hip     Stair knee flexion stretch 5 x 10"   Sit <> stands  2 x 10 No UE     Forward Step ups 2 x 10 high step    Lateral Step ups 2 x 10 high step    Standing marching  2 x 10                                 Patient Education and Home Exercises     Home Exercises Provided and Patient Education Provided     Education provided:   - Plan of Care, Home exercise program, Delayed onset muscle soreness "     Written Home Exercises Provided: Patient instructed to cont prior HEP. Exercises were reviewed and Cary was able to demonstrate them prior to the end of the session.  Cary demonstrated good  understanding of the education provided. See EMR under Patient Instructions for exercises provided during therapy sessions    ASSESSMENT   Cary is a 58 y.o. female referred to outpatient Physical Therapy with a medical diagnosis of R TKR. Patient presents with R knee pain, decreased R knee ROM, decreased R LE muscle strength and motor control. Patient's impairments are currently limiting her overall mobility and activity tolerance. Patient can benefit from skilled PT services to address listed impairments and promote return to PLOF and return to work. LPTA progressed pt to warming up on rec bike with proper positioning to promote increased R knee flexion active range of motion prior to exercises. Pt displays improved activity tolerance with standing tasks. LPTA increased tx intensity according to pt tolerance. No adverse effects noted.      Patient prognosis is Good.   Patient will benefit from skilled outpatient Physical Therapy to address the deficits stated above and in the chart below, provide patient /family education, and to maximize patientt's level of independence.      Plan of care discussed with patient: Yes  Patient's spiritual, cultural and educational needs considered and patient is agreeable to the plan of care and goals as stated below:      Anticipated Barriers for therapy: N/A    Goals:  Short Term Goals: 2 weeks   Patient will independently complete Home exercise program with correct form.   Patient will report decreased R knee pain to less than or equal to 3/10 for improved quality of life.   Patient will complete sit to stand transfer with no UE use for increased independence with function transfers.      Long Term Goals: 4 weeks   Pt will increase R knee flexion to 120 degrees, knee extension to 0  degrees, and quad lag to 0 degrees to allow patient normal gait pattern.  Pt will increase R knee strength to 4/5 to allow patient to safely return to prior level of function   Pt will report decrease in R knee pain to 2/10 with standing for greater than 30 minutes for increased activity tolerance.   Pt will ambulate community distance with increased gait speed to greater than or equal to 3.25 feet per second for increased safety with community ambulation.   Pt will complete 5 x sit<> less than or equal to 15 seconds indicating increased power for functional transfers  Pt will negotiate up and down stairs with reciprocal pattern for increased independence with community ambulation.        PLAN   Plan of care Certification: 3/12/2024 to 4/5/2024.     Outpatient Physical Therapy 3 times weekly for 4 weeks to include the following interventions: Electrical Stimulation unattended, Gait Training, Manual Therapy, Moist Heat/ Ice, Neuromuscular Re-ed, Patient Education, Therapeutic Activities, and Therapeutic Exercise.     Plan to recertify patient per PT order to continue to progress toward physical therapy goals.   VANNA Lipscomb   4/10/2024

## 2024-04-15 ENCOUNTER — CLINICAL SUPPORT (OUTPATIENT)
Dept: REHABILITATION | Facility: HOSPITAL | Age: 59
End: 2024-04-15
Payer: COMMERCIAL

## 2024-04-15 DIAGNOSIS — Z96.651 S/P TOTAL KNEE ARTHROPLASTY, RIGHT: Primary | ICD-10-CM

## 2024-04-15 PROCEDURE — 97110 THERAPEUTIC EXERCISES: CPT | Mod: CQ

## 2024-04-15 PROCEDURE — 97530 THERAPEUTIC ACTIVITIES: CPT | Mod: CQ

## 2024-04-15 NOTE — PROGRESS NOTES
"[facility]   Physical Therapy Treatment Note     Name: Cary Carbone  Clinic Number: [id number]    Therapy Diagnosis: R knee pain   Physician: Trish Goodwin, AMANDA    Visit Date: 4/15/2024    Physician Orders: PT Eval and Treat   Medical Diagnosis from Referral: s/p TKR R   Evaluation Date: 3/12/2024  Authorization Period Expiration: 3/6/2025   Plan of Care Expiration: 4/5/2024  Progress Note Due: 4/5/2024  Date of Surgery: 2/21/2024  Visit # / Visits authorized: 15/20  FOTO: to be completed on visit 20     Precautions: Standard      Time In: 8:03  Time Out: 8:48  Total Billable Time: 45 minutes     PTA Visit #: 5/5      SUBJECTIVE     Pt reports: "My knee is sore, but I did a lap in the [employer] over the weekend".      She was compliant with home exercise program.  Response to previous treatment: N/A  Functional change: improved mobility with less pain.     Pain: 0 /10  Location: right knee      OBJECTIVE     Objective Measures updated at progress report unless specified.     Treatment     Cary received the treatments listed below:      therapeutic exercises to develop strength, endurance, ROM, and flexibility.  See flow-sheet below.       Ther- Ex Reps   Nustep 8 minutes     Wedge 2 minute    Hamstring Stretch 2 x 20"    LAQ's 3x 10, 2#    Hamstring Curls 3 x 10, Green    Quad sets  3 x 10 3" (4 degrees)    SAQ's 3 x 10, 2#    Supine heel slides  2 x 10 (120 degrees)    4 way hip  2 x 10                    Ther-Act     Heel Raises 3 x 10    Mini Squats 3 x 10 *   3 way hip     Stair knee flexion stretch 5 x 10"   Sit <> stands  2 x 10 No UE     Forward Step ups 2 x 10 high step    Lateral Step ups 2 x 10 high step    Standing marching  3 x 10  *                                Patient Education and Home Exercises     Home Exercises Provided and Patient Education Provided     Education provided:   - Plan of Care, Home exercise program, Delayed onset muscle soreness     Written " Home Exercises Provided: Patient instructed to cont prior HEP. Exercises were reviewed and Cary was able to demonstrate them prior to the end of the session.  Cary demonstrated good  understanding of the education provided. See EMR under Patient Instructions for exercises provided during therapy sessions    ASSESSMENT   Cary is a 58 y.o. female referred to outpatient Physical Therapy with a medical diagnosis of R TKR. Patient presents with R knee pain, decreased R knee ROM, decreased R LE muscle strength and motor control. Patient's impairments are currently limiting her overall mobility and activity tolerance. Patient can benefit from skilled PT services to address listed impairments and promote return to PLOF and return to work.  LPTA increased reps of Therapeutic activities as tolerated by patient.  Patient initially demonstrates mild antalgic gait , but is decreased after progressing through Therapeutic exercises and therapeutic activities with mod cues.  Right knee flexion improved at end of physical therapy treatment session.     Patient prognosis is Good.   Patient will benefit from skilled outpatient Physical Therapy to address the deficits stated above and in the chart below, provide patient /family education, and to maximize patientt's level of independence.      Plan of care discussed with patient: Yes  Patient's spiritual, cultural and educational needs considered and patient is agreeable to the plan of care and goals as stated below:      Anticipated Barriers for therapy: N/A    Goals:  Short Term Goals: 2 weeks   Patient will independently complete Home exercise program with correct form.   Patient will report decreased R knee pain to less than or equal to 3/10 for improved quality of life.   Patient will complete sit to stand transfer with no UE use for increased independence with function transfers.      Long Term Goals: 4 weeks   Pt will increase R knee flexion to 120 degrees, knee extension  to 0 degrees, and quad lag to 0 degrees to allow patient normal gait pattern.  Pt will increase R knee strength to 4/5 to allow patient to safely return to prior level of function   Pt will report decrease in R knee pain to 2/10 with standing for greater than 30 minutes for increased activity tolerance.   Pt will ambulate community distance with increased gait speed to greater than or equal to 3.25 feet per second for increased safety with community ambulation.   Pt will complete 5 x sit<> less than or equal to 15 seconds indicating increased power for functional transfers  Pt will negotiate up and down stairs with reciprocal pattern for increased independence with community ambulation.        PLAN   Plan of care Certification: 3/12/2024 to 4/5/2024.     Outpatient Physical Therapy 3 times weekly for 4 weeks to include the following interventions: Electrical Stimulation unattended, Gait Training, Manual Therapy, Moist Heat/ Ice, Neuromuscular Re-ed, Patient Education, Therapeutic Activities, and Therapeutic Exercise.     Plan to recertify patient per PT order to continue to progress toward physical therapy goals.   VANNA Ceja    4/15/2024

## 2024-04-17 ENCOUNTER — CLINICAL SUPPORT (OUTPATIENT)
Dept: REHABILITATION | Facility: HOSPITAL | Age: 59
End: 2024-04-17
Payer: COMMERCIAL

## 2024-04-17 DIAGNOSIS — Z96.651 S/P TOTAL KNEE ARTHROPLASTY, RIGHT: Primary | ICD-10-CM

## 2024-04-17 PROCEDURE — 97110 THERAPEUTIC EXERCISES: CPT

## 2024-04-17 PROCEDURE — 97530 THERAPEUTIC ACTIVITIES: CPT

## 2024-04-17 NOTE — PROGRESS NOTES
"[facility]   Physical Therapy Treatment Note     Name: Cary Carbone  Clinic Number: [id number]    Therapy Diagnosis: R knee pain   Physician: Trish Goodwin, KOFIP    Visit Date: 4/17/2024    Physician Orders: PT Eval and Treat   Medical Diagnosis from Referral: s/p TKR R   Evaluation Date: 3/12/2024  Authorization Period Expiration: 3/6/2025   Plan of Care Expiration: 4/5/2024  Progress Note Due: 4/5/2024  Date of Surgery: 2/21/2024  Visit # / Visits authorized: 16/20  FOTO: to be completed on visit 20     Precautions: Standard      Time In: 10:13  Time Out:11:08  Total Billable Time: 55 minutes (47 minutes billed and 8 minutes not billed for ice)     PTA Visit #: 0/5      SUBJECTIVE     Pt reports: "Sometimes my knee feels like it is going to give out still."     She was compliant with home exercise program.  Response to previous treatment: N/A  Functional change: improved mobility with less pain.     Pain: 0 /10  Location: right knee      OBJECTIVE     Objective Measures updated at progress report unless specified.     Treatment     Cary received the treatments listed below:      therapeutic exercises to develop strength, endurance, ROM, and flexibility.  See flow-sheet below.    Therapeutic activities to improve functional performance including: see flowsheet below      Ther- Ex Reps   Nustep 8 minutes     Wedge 2 minute    Hamstring Stretch 2 x 20"    LAQ's 3x 10, 2#    Hamstring Curls 3 x 10, Green    Quad sets  3 x 10 3" (5 degrees)    SAQ's 3 x 10, 2#    Supine heel slides  2 x 10 (121 degrees)    SLR 4 x 5 reps    TKEs  3 x 10 red TB *               Ther-Act     Heel Raises 3 x 10    Mini Squats 3 x 10    3 way hip 15 x each    Stair knee flexion stretch 5 x 10"   Sit <> stands  2 x 10 No UE     Forward Step ups 2 x 10 high step    Lateral Step ups 2 x 10 high step    Standing marching  3 x 10                                  Patient Education and Home Exercises     Home " Exercises Provided and Patient Education Provided     Education provided:   - Plan of Care, Home exercise program, Delayed onset muscle soreness     Written Home Exercises Provided: Patient instructed to cont prior HEP. Exercises were reviewed and Cary was able to demonstrate them prior to the end of the session.  Cary demonstrated good  understanding of the education provided. See EMR under Patient Instructions for exercises provided during therapy sessions    ASSESSMENT   Cary is a 58 y.o. female referred to outpatient Physical Therapy with a medical diagnosis of R TKR. Patient presents with R knee pain, decreased R knee ROM, decreased R LE muscle strength and motor control. Patient's impairments are currently limiting her overall mobility and activity tolerance. Patient can benefit from skilled PT services to address listed impairments and promote return to PLOF and return to work. PT provided patient with verbal and occasional tactile cues to decrease R knee valgus and improve R knee motor control with standing tasks. PT also noted occasional episodes of decreased R knee stability during single leg stand on R LE. PT initiated Terminal knee extension exercise with resistance to increase R knee stability. Patient continues to demonstrate approximately 5 degree quad lag especially with increased fatigue. No adverse effects noted to treatment.     Patient prognosis is Good.   Patient will benefit from skilled outpatient Physical Therapy to address the deficits stated above and in the chart below, provide patient /family education, and to maximize patientt's level of independence.      Plan of care discussed with patient: Yes  Patient's spiritual, cultural and educational needs considered and patient is agreeable to the plan of care and goals as stated below:      Anticipated Barriers for therapy: N/A    Goals:  Short Term Goals: 2 weeks   Patient will independently complete Home exercise program with correct  form.   Patient will report decreased R knee pain to less than or equal to 3/10 for improved quality of life.   Patient will complete sit to stand transfer with no UE use for increased independence with function transfers.      Long Term Goals: 4 weeks   Pt will increase R knee flexion to 120 degrees, knee extension to 0 degrees, and quad lag to 0 degrees to allow patient normal gait pattern.  Pt will increase R knee strength to 4/5 to allow patient to safely return to prior level of function   Pt will report decrease in R knee pain to 2/10 with standing for greater than 30 minutes for increased activity tolerance.   Pt will ambulate community distance with increased gait speed to greater than or equal to 3.25 feet per second for increased safety with community ambulation.   Pt will complete 5 x sit<> less than or equal to 15 seconds indicating increased power for functional transfers  Pt will negotiate up and down stairs with reciprocal pattern for increased independence with community ambulation.        PLAN   Plan of care Certification: 3/12/2024 to 4/5/2024.     Outpatient Physical Therapy 3 times weekly for 4 weeks to include the following interventions: Electrical Stimulation unattended, Gait Training, Manual Therapy, Moist Heat/ Ice, Neuromuscular Re-ed, Patient Education, Therapeutic Activities, and Therapeutic Exercise.     Plan to recertify patient per PT order to continue to progress toward physical therapy goals.   Caleb German, PT, DPT     4/17/2024

## 2024-04-22 ENCOUNTER — CLINICAL SUPPORT (OUTPATIENT)
Dept: REHABILITATION | Facility: HOSPITAL | Age: 59
End: 2024-04-22
Payer: COMMERCIAL

## 2024-04-22 DIAGNOSIS — Z96.651 S/P TOTAL KNEE ARTHROPLASTY, RIGHT: Primary | ICD-10-CM

## 2024-04-22 DIAGNOSIS — K21.00 GASTROESOPHAGEAL REFLUX DISEASE WITH ESOPHAGITIS WITHOUT HEMORRHAGE: Primary | ICD-10-CM

## 2024-04-22 PROCEDURE — 97110 THERAPEUTIC EXERCISES: CPT | Mod: CQ

## 2024-04-22 PROCEDURE — 97530 THERAPEUTIC ACTIVITIES: CPT | Mod: CQ

## 2024-04-22 RX ORDER — PANTOPRAZOLE SODIUM 40 MG/1
40 TABLET, DELAYED RELEASE ORAL DAILY
Qty: 90 TABLET | Refills: 3 | Status: SHIPPED | OUTPATIENT
Start: 2024-04-22 | End: 2025-04-22

## 2024-04-22 NOTE — PROGRESS NOTES
"[facility]   Physical Therapy Treatment Note     Name: Cary Carbone  Clinic Number: [id number]    Therapy Diagnosis: R knee pain   Physician: Trish Goodwin FNP    Visit Date: 4/22/2024    Physician Orders: PT Eval and Treat   Medical Diagnosis from Referral: s/p TKR R   Evaluation Date: 3/12/2024  Authorization Period Expiration: 3/6/2025   Plan of Care Expiration: 4/5/2024  Progress Note Due: 4/5/2024  Date of Surgery: 2/21/2024  Visit # / Visits authorized: 17/20  FOTO: to be completed on visit 20     Precautions: Standard      Time In: 10:10  Time Out:10:55  Total Billable Time: 45 minutes     PTA Visit #: 1/5      SUBJECTIVE     Pt reports: "I've been trying to put more pressure on it lately."     She was compliant with home exercise program.  Response to previous treatment: N/A  Functional change: improved mobility with less pain.     Pain: 0 /10  Location: right knee      OBJECTIVE     Objective Measures updated at progress report unless specified.     Treatment     Cary received the treatments listed below:      therapeutic exercises to develop strength, endurance, ROM, and flexibility.  See flow-sheet below.    Therapeutic activities to improve functional performance including: see flowsheet below      Ther- Ex Reps   Nustep 8 minutes     Wedge 2 minute    Hamstring Stretch 2 x 20"    LAQ's 3x 10, 2#    Hamstring Curls 3 x 10, Green    Quad sets  3 x 10 3" (5 degrees)    SAQ's 3 x 10, 2#    Supine heel slides  2 x 10 (121 degrees)    SLR 4 x 5 reps    TKEs  3 x 10 red TB *               Ther-Act     Heel Raises 3 x 10    Mini Squats 3 x 10    3 way hip 15 x each    Stair knee flexion stretch 5 x 10"   Sit <> stands  2 x 10 No UE     Forward Step ups 2 x 10 high step    Lateral Step ups 2 x 10 high step    Standing marching  3 x 10                                  Patient Education and Home Exercises     Home Exercises Provided and Patient Education Provided     Education " provided:   - Plan of Care, Home exercise program, Delayed onset muscle soreness     Written Home Exercises Provided: Patient instructed to cont prior HEP. Exercises were reviewed and Cary was able to demonstrate them prior to the end of the session.  Cary demonstrated good  understanding of the education provided. See EMR under Patient Instructions for exercises provided during therapy sessions    ASSESSMENT   Cary is a 58 y.o. female referred to outpatient Physical Therapy with a medical diagnosis of R TKR. Patient presents with R knee pain, decreased R knee ROM, decreased R LE muscle strength and motor control. Patient's impairments are currently limiting her overall mobility and activity tolerance. Patient can benefit from skilled PT services to address listed impairments and promote return to PLOF and return to work. LPTA provided pt with verbal and occasional tactile cues to decrease R knee valgus and improve R knee motor control with standing tasks. Pt continues to display difficulty obtaining full active knee extension range of motion. Pt with improved carryover of previous tx session. No adverse effects noted from tx.     Patient prognosis is Good.   Patient will benefit from skilled outpatient Physical Therapy to address the deficits stated above and in the chart below, provide patient /family education, and to maximize patientt's level of independence.      Plan of care discussed with patient: Yes  Patient's spiritual, cultural and educational needs considered and patient is agreeable to the plan of care and goals as stated below:      Anticipated Barriers for therapy: N/A    Goals:  Short Term Goals: 2 weeks   Patient will independently complete Home exercise program with correct form.   Patient will report decreased R knee pain to less than or equal to 3/10 for improved quality of life.   Patient will complete sit to stand transfer with no UE use for increased independence with function transfers.       Long Term Goals: 4 weeks   Pt will increase R knee flexion to 120 degrees, knee extension to 0 degrees, and quad lag to 0 degrees to allow patient normal gait pattern.  Pt will increase R knee strength to 4/5 to allow patient to safely return to prior level of function   Pt will report decrease in R knee pain to 2/10 with standing for greater than 30 minutes for increased activity tolerance.   Pt will ambulate community distance with increased gait speed to greater than or equal to 3.25 feet per second for increased safety with community ambulation.   Pt will complete 5 x sit<> less than or equal to 15 seconds indicating increased power for functional transfers  Pt will negotiate up and down stairs with reciprocal pattern for increased independence with community ambulation.        PLAN   Plan of care Certification: 3/12/2024 to 4/5/2024.     Outpatient Physical Therapy 3 times weekly for 4 weeks to include the following interventions: Electrical Stimulation unattended, Gait Training, Manual Therapy, Moist Heat/ Ice, Neuromuscular Re-ed, Patient Education, Therapeutic Activities, and Therapeutic Exercise.     Plan to recertify patient per PT order to continue to progress toward physical therapy goals.   VANNA Lipscomb  4/22/2024

## 2024-04-24 ENCOUNTER — CLINICAL SUPPORT (OUTPATIENT)
Dept: REHABILITATION | Facility: HOSPITAL | Age: 59
End: 2024-04-24
Payer: COMMERCIAL

## 2024-04-24 DIAGNOSIS — Z96.651 S/P TOTAL KNEE ARTHROPLASTY, RIGHT: Primary | ICD-10-CM

## 2024-04-24 PROCEDURE — 97530 THERAPEUTIC ACTIVITIES: CPT | Mod: CQ

## 2024-04-24 PROCEDURE — 97110 THERAPEUTIC EXERCISES: CPT | Mod: CQ

## 2024-04-24 NOTE — PROGRESS NOTES
"[facility]   Physical Therapy Treatment Note     Name: Cary Carbone  Clinic Number: [id number]    Therapy Diagnosis: R knee pain   Physician: Trish Goodwin, AMANDA    Visit Date: 4/24/2024    Physician Orders: PT Eval and Treat   Medical Diagnosis from Referral: s/p TKR R   Evaluation Date: 3/12/2024  Authorization Period Expiration: 3/6/2025   Plan of Care Expiration: 4/5/2024  Progress Note Due: 4/5/2024  Date of Surgery: 2/21/2024  Visit # / Visits authorized: 18/20  FOTO: to be completed on visit 20     Precautions: Standard      Time In: 8:45  Time Out: 9:27  Total Billable Time: 42 minutes     PTA Visit #:  2/5      SUBJECTIVE     Pt reports:  "My knee stays sore, and it's still swelling some yumiko in the evening and at the end of the day".  Patient states she has been icing and elevating knee at home.     She was compliant with home exercise program.  Response to previous treatment: N/A  Functional change: improved mobility with less pain.     Pain: 2/10  Location: right knee      OBJECTIVE     Objective Measures updated at progress report unless specified.     Treatment     Cary received the treatments listed below:      therapeutic exercises to develop strength, endurance, ROM, and flexibility.  See flow-sheet below.    Therapeutic activities to improve functional performance including: see flowsheet below      Ther- Ex Reps   Nustep 8 minutes     Wedge 2 minute    Hamstring Stretch 2 x 20"    LAQ's 3x 10, 2#    Hamstring Curls 3 x 10, Green    Quad sets  3 x 10 3" (5 degrees)    SAQ's 3 x 10, 2#    Supine heel slides  2 x 10 (121 degrees)    SLR 4 x 5 reps    TKEs  3 x 10 red TB *   Hip adduction with bridge   2 x 10 *          Ther-Act     Heel Raises 3 x 10    Mini Squats 3 x 10    3 way hip 15 x each    Stair knee flexion stretch 5 x 10"   Sit <> stands  2 x 10 No UE     Forward Step ups 2 x 10 high step    Lateral Step ups 2 x 10 high step    Standing marching  3 x 10       "                            Patient Education and Home Exercises     Home Exercises Provided and Patient Education Provided     Education provided:   - Plan of Care, Home exercise program, Delayed onset muscle soreness     Written Home Exercises Provided: Patient instructed to cont prior HEP. Exercises were reviewed and Cary was able to demonstrate them prior to the end of the session.  Cary demonstrated good  understanding of the education provided. See EMR under Patient Instructions for exercises provided during therapy sessions    ASSESSMENT   Cary is a 58 y.o. female referred to outpatient Physical Therapy with a medical diagnosis of R TKR. Patient presents with R knee pain, decreased R knee ROM, decreased R LE muscle strength and motor control. Patient's impairments are currently limiting her overall mobility and activity tolerance. Patient can benefit from skilled PT services to address listed impairments and promote return to PLOF and return to work. LPTA provided pt with verbal and occasional tactile cues to decrease R knee valgus and improve R knee motor control with standing tasks.  Patient requires occasional verbal and visual cues to decrease off-loading of Right LE during Therapeutic standing activities.  No adverse effects noted during treatment session.       Patient prognosis is Good.   Patient will benefit from skilled outpatient Physical Therapy to address the deficits stated above and in the chart below, provide patient /family education, and to maximize patientt's level of independence.      Plan of care discussed with patient: Yes  Patient's spiritual, cultural and educational needs considered and patient is agreeable to the plan of care and goals as stated below:      Anticipated Barriers for therapy: N/A    Goals:  Short Term Goals: 2 weeks   Patient will independently complete Home exercise program with correct form.   Patient will report decreased R knee pain to less than or equal to  3/10 for improved quality of life.   Patient will complete sit to stand transfer with no UE use for increased independence with function transfers.      Long Term Goals: 4 weeks   Pt will increase R knee flexion to 120 degrees, knee extension to 0 degrees, and quad lag to 0 degrees to allow patient normal gait pattern.  Pt will increase R knee strength to 4/5 to allow patient to safely return to prior level of function   Pt will report decrease in R knee pain to 2/10 with standing for greater than 30 minutes for increased activity tolerance.   Pt will ambulate community distance with increased gait speed to greater than or equal to 3.25 feet per second for increased safety with community ambulation.   Pt will complete 5 x sit<> less than or equal to 15 seconds indicating increased power for functional transfers  Pt will negotiate up and down stairs with reciprocal pattern for increased independence with community ambulation.        PLAN   Plan of care Certification: 3/12/2024 to 4/5/2024.     Outpatient Physical Therapy 3 times weekly for 4 weeks to include the following interventions: Electrical Stimulation unattended, Gait Training, Manual Therapy, Moist Heat/ Ice, Neuromuscular Re-ed, Patient Education, Therapeutic Activities, and Therapeutic Exercise.     Plan to recertify patient per PT order to continue to progress toward physical therapy goals.   VANNA Ceja   4/24/2024

## 2024-04-25 ENCOUNTER — HOSPITAL ENCOUNTER (OUTPATIENT)
Dept: RADIOLOGY | Facility: HOSPITAL | Age: 59
Discharge: HOME | End: 2024-04-25
Attending: NURSE PRACTITIONER
Payer: COMMERCIAL

## 2024-04-25 VITALS — HEIGHT: 66 IN | BODY MASS INDEX: 32.14 KG/M2 | WEIGHT: 200 LBS

## 2024-04-25 DIAGNOSIS — Z12.31 OTHER SCREENING MAMMOGRAM: ICD-10-CM

## 2024-04-25 PROCEDURE — 77067 SCR MAMMO BI INCL CAD: CPT | Mod: TC

## 2024-04-25 PROCEDURE — 77063 BREAST TOMOSYNTHESIS BI: CPT | Mod: TC

## 2024-04-29 ENCOUNTER — TELEPHONE (OUTPATIENT)
Dept: ORTHOPEDICS | Facility: CLINIC | Age: 59
End: 2024-04-29
Payer: COMMERCIAL

## 2024-04-29 ENCOUNTER — CLINICAL SUPPORT (OUTPATIENT)
Dept: REHABILITATION | Facility: HOSPITAL | Age: 59
End: 2024-04-29
Payer: COMMERCIAL

## 2024-04-29 DIAGNOSIS — Z96.651 S/P TOTAL KNEE ARTHROPLASTY, RIGHT: Primary | ICD-10-CM

## 2024-04-29 PROCEDURE — 97110 THERAPEUTIC EXERCISES: CPT | Mod: CQ

## 2024-04-29 PROCEDURE — 97530 THERAPEUTIC ACTIVITIES: CPT | Mod: CQ

## 2024-04-29 NOTE — TELEPHONE ENCOUNTER
----- Message from Naima Pulido sent at 4/29/2024 10:11 AM CDT -----  Calling to see if she is to continue her therapy until her appt on 05/21. Therapy is set to end on Thursday. Call back # [phone]

## 2024-04-29 NOTE — PROGRESS NOTES
"[facility]   Physical Therapy Treatment Note     Name: Cary Carbone  Clinic Number: [id number]    Therapy Diagnosis: R knee pain   Physician: Trish Goodwin, AMANDA    Visit Date: 4/29/2024    Physician Orders: PT Eval and Treat   Medical Diagnosis from Referral: s/p TKR R   Evaluation Date: 3/12/2024  Authorization Period Expiration: 3/6/2025   Plan of Care Expiration: 4/5/2024  Progress Note Due: 4/5/2024  Date of Surgery: 2/21/2024  Visit # / Visits authorized: 19/20  FOTO: to be completed on visit 20     Precautions: Standard      Time In: 8:00  Time Out: 8:51  Total Billable Time: 51 minutes     PTA Visit #:  3/5      SUBJECTIVE     Pt reports:  "My knee is still sore".     She was compliant with home exercise program.  Response to previous treatment: N/A  Functional change: improved mobility with less pain.     Pain: 2/10  Location: right knee      OBJECTIVE     Objective Measures updated at progress report unless specified.     Treatment     Cary received the treatments listed below:      therapeutic exercises to develop strength, endurance, ROM, and flexibility.  See flow-sheet below.    Therapeutic activities to improve functional performance including: see flowsheet below      Ther- Ex Reps   Nustep 8 minutes     Wedge 2 minute    Hamstring Stretch 2 x 20"    LAQ's 3x 10, 2#    Hamstring Curls 3 x 10, Green    Quad sets  3 x 10 3" (5 degrees)    SAQ's 3 x 10, 2#    Supine heel slides  2 x 10 (121 degrees)    SLR 4 x 5 reps    TKEs  3 x 10 red TB    Hip adduction with bridge  2 x 10          Ther-Act     Heel Raises 3 x 10    Mini Squats 3 x 10    3 way hip 15 x each    Stair knee flexion stretch 5 x 10"   Sit <> stands  2 x 10 No UE     Forward Step ups 2 x 10 high step    Lateral Step ups 2 x 10 high step    Standing marching  3 x 10                                  Patient Education and Home Exercises     Home Exercises Provided and Patient Education Provided     Education " provided:   - Plan of Care, Home exercise program, Delayed onset muscle soreness     Written Home Exercises Provided: Patient instructed to cont prior HEP. Exercises were reviewed and Cary was able to demonstrate them prior to the end of the session.  Cary demonstrated good  understanding of the education provided. See EMR under Patient Instructions for exercises provided during therapy sessions    ASSESSMENT   Cary is a 58 y.o. female referred to outpatient Physical Therapy with a medical diagnosis of R TKR. Patient presents with R knee pain, decreased R knee ROM, decreased R LE muscle strength and motor control. Patient's impairments are currently limiting her overall mobility and activity tolerance. Patient can benefit from skilled PT services to address listed impairments and promote return to PLOF and return to work. Pt displays improved motor control and activity tolerance throughout tx.  Pt prompted with occasional verbal cues during standing exercises to improve form and decrease compensations.  No adverse effects noted.       Patient prognosis is Good.   Patient will benefit from skilled outpatient Physical Therapy to address the deficits stated above and in the chart below, provide patient /family education, and to maximize patientt's level of independence.      Plan of care discussed with patient: Yes  Patient's spiritual, cultural and educational needs considered and patient is agreeable to the plan of care and goals as stated below:      Anticipated Barriers for therapy: N/A    Goals:  Short Term Goals: 2 weeks   Patient will independently complete Home exercise program with correct form.   Patient will report decreased R knee pain to less than or equal to 3/10 for improved quality of life.   Patient will complete sit to stand transfer with no UE use for increased independence with function transfers.      Long Term Goals: 4 weeks   Pt will increase R knee flexion to 120 degrees, knee extension to  0 degrees, and quad lag to 0 degrees to allow patient normal gait pattern.  Pt will increase R knee strength to 4/5 to allow patient to safely return to prior level of function   Pt will report decrease in R knee pain to 2/10 with standing for greater than 30 minutes for increased activity tolerance.   Pt will ambulate community distance with increased gait speed to greater than or equal to 3.25 feet per second for increased safety with community ambulation.   Pt will complete 5 x sit<> less than or equal to 15 seconds indicating increased power for functional transfers  Pt will negotiate up and down stairs with reciprocal pattern for increased independence with community ambulation.        PLAN   Plan of care Certification: 3/12/2024 to 4/5/2024.     Outpatient Physical Therapy 3 times weekly for 4 weeks to include the following interventions: Electrical Stimulation unattended, Gait Training, Manual Therapy, Moist Heat/ Ice, Neuromuscular Re-ed, Patient Education, Therapeutic Activities, and Therapeutic Exercise.     Plan to recertify patient per PT order to continue to progress toward physical therapy goals.   VANNA Lipscomb   4/29/2024

## 2024-05-01 ENCOUNTER — CLINICAL SUPPORT (OUTPATIENT)
Dept: REHABILITATION | Facility: HOSPITAL | Age: 59
End: 2024-05-01
Payer: COMMERCIAL

## 2024-05-01 DIAGNOSIS — Z96.651 S/P TOTAL KNEE ARTHROPLASTY, RIGHT: Primary | ICD-10-CM

## 2024-05-01 PROCEDURE — 97110 THERAPEUTIC EXERCISES: CPT | Mod: CQ

## 2024-05-01 PROCEDURE — 97530 THERAPEUTIC ACTIVITIES: CPT | Mod: CQ

## 2024-05-01 NOTE — PROGRESS NOTES
"[facility]   Physical Therapy Treatment Note     Name: Cary Carbone  Clinic Number: [id number]    Therapy Diagnosis: R knee pain   Physician: Trish Goodwin FNP    Visit Date: 5/1/2024    Physician Orders: PT Eval and Treat   Medical Diagnosis from Referral: s/p TKR R   Evaluation Date: 3/12/2024  Authorization Period Expiration: 3/6/2025   Plan of Care Expiration: 4/5/2024  Progress Note Due: 4/5/2024  Date of Surgery: 2/21/2024  Visit # / Visits authorized: 20/20  FOTO: to be completed on visit 20     Precautions: Standard      Time In: 8:03  Time Out: 8:48  Total Billable Time: 45 minutes    PTA Visit #:  4/5      SUBJECTIVE     Pt reports: "My knee (R) still got so much swelling in it".     She was compliant with home exercise program.  Response to previous treatment: N/A  Functional change: improved mobility with less pain.     Pain: 2/10  Location: right knee      OBJECTIVE     Objective Measures updated at progress report unless specified.     Treatment     Cary received the treatments listed below:      therapeutic exercises to develop strength, endurance, ROM, and flexibility.  See flow-sheet below.    Therapeutic activities to improve functional performance including: see flowsheet below      Ther- Ex Reps   Nustep 8 minutes     Wedge 2 minute    Hamstring Stretch 2 x 20"    LAQ's 3x 10, 2#    Hamstring Curls 3 x 10, Green    Quad sets  3 x 10 3" (5 degrees)    SAQ's 3 x 10, 2#    Supine heel slides  2 x 10 (121 degrees)    SLR 4 x 5 reps    TKEs  3 x 10 red TB    Hip adduction with bridge  2 x 10          Ther-Act     Heel Raises 3 x 10    Mini Squats 3 x 10    3 way hip 15 x each    Stair knee flexion stretch 5 x 10"   Sit <> stands  2 x 10 No UE     Forward Step ups 2 x 10 high step    Lateral Step ups 2 x 10 high step    Standing marching  3 x 10                                  Patient Education and Home Exercises     Home Exercises Provided and Patient Education " Provided     Education provided:   - Plan of Care, Home exercise program, Delayed onset muscle soreness     Written Home Exercises Provided: Patient instructed to cont prior HEP. Exercises were reviewed and Cary was able to demonstrate them prior to the end of the session.  Cary demonstrated good  understanding of the education provided. See EMR under Patient Instructions for exercises provided during therapy sessions    ASSESSMENT   Cary is a 58 y.o. female referred to outpatient Physical Therapy with a medical diagnosis of R TKR. Patient presents with R knee pain, decreased R knee ROM, decreased R LE muscle strength and motor control. Patient's impairments are currently limiting her overall mobility and activity tolerance. Patient can benefit from skilled PT services to address listed impairments and promote return to PLOF and return to work. Pt  able to progress through completion of Therapeutic exercises and Therapeutic activities with improve functional mobility and ROM of Right knee.  No adverse effects noted or reported.     Patient prognosis is Good.   Patient will benefit from skilled outpatient Physical Therapy to address the deficits stated above and in the chart below, provide patient /family education, and to maximize patientt's level of independence.      Plan of care discussed with patient: Yes  Patient's spiritual, cultural and educational needs considered and patient is agreeable to the plan of care and goals as stated below:      Anticipated Barriers for therapy: N/A    Goals:  Short Term Goals: 2 weeks   Patient will independently complete Home exercise program with correct form.   Patient will report decreased R knee pain to less than or equal to 3/10 for improved quality of life.   Patient will complete sit to stand transfer with no UE use for increased independence with function transfers.      Long Term Goals: 4 weeks   Pt will increase R knee flexion to 120 degrees, knee extension to 0  degrees, and quad lag to 0 degrees to allow patient normal gait pattern.  Pt will increase R knee strength to 4/5 to allow patient to safely return to prior level of function   Pt will report decrease in R knee pain to 2/10 with standing for greater than 30 minutes for increased activity tolerance.   Pt will ambulate community distance with increased gait speed to greater than or equal to 3.25 feet per second for increased safety with community ambulation.   Pt will complete 5 x sit<> less than or equal to 15 seconds indicating increased power for functional transfers  Pt will negotiate up and down stairs with reciprocal pattern for increased independence with community ambulation.        PLAN   Plan of care Certification: 3/12/2024 to 4/5/2024.     Outpatient Physical Therapy 3 times weekly for 4 weeks to include the following interventions: Electrical Stimulation unattended, Gait Training, Manual Therapy, Moist Heat/ Ice, Neuromuscular Re-ed, Patient Education, Therapeutic Activities, and Therapeutic Exercise.     Plan to recertify patient per PT order to continue to progress toward physical therapy goals.   VANNA Ceja   5/1/2024

## 2024-05-02 NOTE — PLAN OF CARE
[facility]  Physical Therapy Plan of Care Note     Name: Cary Carbone  Clinic Number: [id number]    Therapy Diagnosis: R knee pain   Physician: Trish Goodwin FNP    Physician Orders: PT Eval and Treat   Medical Diagnosis from Referral: s/p TKR R   Evaluation Date: 3/12/2024  Authorization Period Expiration: 3/6/2025   Plan of Care Expiration:5/31/2024  Progress Note Due: 5/31/2024  Date of Surgery: 2/21/2024  Visit # / Visits authorized: 20/28  FOTO: to be completed on visit 20    Precautions: Standard  Functional Level Prior to Evaluation:  Independent     Subjective     Update: Patient continues to report soreness in her R knee and swelling with increased activity levels.     Objective      Update: Patient demonstrates increased R knee ROM and strength resulting in improved gait mechanics and increased independence with functional mobility.     Assessment     Update: Patient can benefit from continued skilled PT services to continue increasing R knee strength and stability to continue progressing toward rehab goals and prepare to return to work.     Previous Short Term Goals Status:     Patient will independently complete Home exercise program with correct form.   Patient will report decreased R knee pain to less than or equal to 3/10 for improved quality of life.   Patient will complete sit to stand transfer with no UE use for increased independence with function transfers.     Long Term Goal Status: continue per initial plan of care.  Reasons for Recertification of Therapy:   continue progressing toward rehab goals per MD orders    GOALS  Pt will increase R knee flexion to 120 degrees, knee extension to 0 degrees, and quad lag to 0 degrees to allow patient normal gait pattern.-Goal in progress 5-121 degrees   Pt will increase R knee strength to 4/5 to allow patient to safely return to prior level of function -Goal in progress   Pt will report decrease in R knee pain to 2/10 with  standing for greater than 30 minutes for increased activity tolerance. -Goal in progress  Pt will ambulate community distance with increased gait speed to greater than or equal to 3.25 feet per second for increased safety with community ambulation. -Goal in progress   Pt will complete 5 x sit<> less than or equal to 15 seconds indicating increased power for functional transfers- MET   Pt will negotiate up and down stairs with reciprocal pattern for increased independence with community ambulation.- MET     Plan     Updated Certification Period: 5/1/2024 to 5/31/2024   Recommended Treatment Plan: 2 times per week for 4 weeks:  Electrical Stimulation unattended, Gait Training, Manual Therapy, Moist Heat/ Ice, Neuromuscular Re-ed, Patient Education, Therapeutic Activities, and Therapeutic Exercise    Caleb German, PT, DPT

## 2024-05-06 ENCOUNTER — CLINICAL SUPPORT (OUTPATIENT)
Dept: REHABILITATION | Facility: HOSPITAL | Age: 59
End: 2024-05-06
Payer: COMMERCIAL

## 2024-05-06 DIAGNOSIS — Z96.651 S/P TOTAL KNEE ARTHROPLASTY, RIGHT: Primary | ICD-10-CM

## 2024-05-06 PROCEDURE — 97010 HOT OR COLD PACKS THERAPY: CPT | Mod: CQ

## 2024-05-06 PROCEDURE — 97530 THERAPEUTIC ACTIVITIES: CPT | Mod: CQ

## 2024-05-06 PROCEDURE — 97110 THERAPEUTIC EXERCISES: CPT | Mod: CQ

## 2024-05-06 NOTE — PROGRESS NOTES
"[facility]   Physical Therapy Treatment Note     Name: Cary Carbone  Clinic Number: [id number]    Therapy Diagnosis: R knee pain   Physician: Trish Goodwin, KOFIP    Visit Date: 5/6/2024    Physician Orders: PT Eval and Treat   Medical Diagnosis from Referral: s/p TKR R   Evaluation Date: 3/12/2024  Authorization Period Expiration: 3/6/2025   Plan of Care Expiration: 5/29/2024  Progress Note Due: 5/29/2024  Date of Surgery: 2/21/2024  Visit # / Visits authorized: 21/28  FOTO: to be completed on visit 20     Precautions: Standard      Time In: 9:32  Time Out: 10:20   Total Billable Time: 48 minutes (6 minutes not billable)    PTA Visit #:  5/5      SUBJECTIVE     Pt reports: "My knee is sore but I did a lot of walking this weekend".     She was compliant with home exercise program.  Response to previous treatment: N/A  Functional change: improved mobility with less pain.     Pain: 2/10  Location: right knee      OBJECTIVE     Objective Measures updated at progress report unless specified.     Treatment     Cary received the treatments listed below:      therapeutic exercises to develop strength, endurance, ROM, and flexibility.  See flow-sheet below.    Therapeutic activities to improve functional performance including: see flowsheet below      Ther- Ex Reps   Nustep 8 minutes     Wedge 2 minute    Hamstring Stretch 2 x 20"    LAQ's 3x 10, 2#    Hamstring Curls 3 x 10, Green    Quad sets  3 x 10 3" (5 degrees)    SAQ's 3 x 10, 2#    Supine heel slides  2 x 10 (121 degrees)    SLR 4 x 5 reps    TKEs  3 x 10 red TB    Hip adduction with bridge  2 x 10          Ther-Act     Heel Raises 3 x 10    Mini Squats 3 x 10    3 way hip 15 x each    Stair knee flexion stretch 5 x 10"   Sit <> stands  2 x 10 No UE     Forward Step ups 2 x 10 high step    Lateral Step ups 2 x 10 high step    Standing marching  3 x 10                                  Patient Education and Home Exercises     Home " Exercises Provided and Patient Education Provided     Education provided:   - Plan of Care, Home exercise program, Delayed onset muscle soreness     Written Home Exercises Provided: Patient instructed to cont prior HEP. Exercises were reviewed and Cary was able to demonstrate them prior to the end of the session.  Cary demonstrated good  understanding of the education provided. See EMR under Patient Instructions for exercises provided during therapy sessions    ASSESSMENT   Cary is a 58 y.o. female referred to outpatient Physical Therapy with a medical diagnosis of R TKR. Patient presents with R knee pain, decreased R knee ROM, decreased R LE muscle strength and motor control. Patient's impairments are currently limiting her overall mobility and activity tolerance. Patient can benefit from skilled PT services to address listed impairments and promote return to PLOF and return to work. Pt displays improved carryover of all exercises and activities. Pt continues to progress with improved strength and coordination during therapeutic activity. No adverse effects noted or reported.     Patient prognosis is Good.   Patient will benefit from skilled outpatient Physical Therapy to address the deficits stated above and in the chart below, provide patient /family education, and to maximize patientt's level of independence.      Plan of care discussed with patient: Yes  Patient's spiritual, cultural and educational needs considered and patient is agreeable to the plan of care and goals as stated below:      Anticipated Barriers for therapy: N/A    Goals:  Short Term Goals: 2 weeks   Patient will independently complete Home exercise program with correct form.   Patient will report decreased R knee pain to less than or equal to 3/10 for improved quality of life.   Patient will complete sit to stand transfer with no UE use for increased independence with function transfers.      Long Term Goals: 4 weeks   Pt will increase R  knee flexion to 120 degrees, knee extension to 0 degrees, and quad lag to 0 degrees to allow patient normal gait pattern.  Pt will increase R knee strength to 4/5 to allow patient to safely return to prior level of function   Pt will report decrease in R knee pain to 2/10 with standing for greater than 30 minutes for increased activity tolerance.   Pt will ambulate community distance with increased gait speed to greater than or equal to 3.25 feet per second for increased safety with community ambulation.   Pt will complete 5 x sit<> less than or equal to 15 seconds indicating increased power for functional transfers  Pt will negotiate up and down stairs with reciprocal pattern for increased independence with community ambulation.        PLAN   Plan of care Certification: 3/12/2024 to 5/29/2024.     Outpatient Physical Therapy 2 times weekly for 4 weeks to include the following interventions: Electrical Stimulation unattended, Gait Training, Manual Therapy, Moist Heat/ Ice, Neuromuscular Re-ed, Patient Education, Therapeutic Activities, and Therapeutic Exercise.     Plan to recertify patient per PT order to continue to progress toward physical therapy goals.   VANNA Lipscomb   5/6/2024

## 2024-05-08 ENCOUNTER — CLINICAL SUPPORT (OUTPATIENT)
Dept: REHABILITATION | Facility: HOSPITAL | Age: 59
End: 2024-05-08
Payer: COMMERCIAL

## 2024-05-08 DIAGNOSIS — Z96.651 S/P TOTAL KNEE ARTHROPLASTY, RIGHT: Primary | ICD-10-CM

## 2024-05-08 PROCEDURE — 97530 THERAPEUTIC ACTIVITIES: CPT

## 2024-05-08 PROCEDURE — 97110 THERAPEUTIC EXERCISES: CPT

## 2024-05-08 NOTE — PROGRESS NOTES
"[facility]   Physical Therapy Treatment Note     Name: Cary Carbone  Clinic Number: [id number]    Therapy Diagnosis: R knee pain   Physician: Trish Goodwin FNP    Visit Date: 5/8/2024    Physician Orders: PT Eval and Treat   Medical Diagnosis from Referral: s/p TKR R   Evaluation Date: 3/12/2024  Authorization Period Expiration: 3/6/2025   Plan of Care Expiration: 5/29/2024  Progress Note Due: 5/29/2024  Date of Surgery: 2/21/2024  Visit # / Visits authorized: 22/28  FOTO: to be completed on visit 28     Precautions: Standard      Time In: 10:16  Time Out: 11:03  Total Billable Time: 47 minutes     PTA Visit #:  0/5  SUBJECTIVE     Pt reports: "My knee woke me up this morning aching."     She was compliant with home exercise program.  Response to previous treatment: N/A  Functional change: improved mobility with less pain.     Pain: 3/10  Location: right knee      OBJECTIVE     Objective Measures updated at progress report unless specified.     Treatment     Cary received the treatments listed below:      therapeutic exercises to develop strength, endurance, ROM, and flexibility.  See flow-sheet below.    Therapeutic activities to improve functional performance including: see flowsheet below      Ther- Ex Reps   BIke 8 minutes     Wedge 2 minute    Hamstring Stretch 2 x 20"    LAQ's 3x 10, 2#    Hamstring Curls 3 x 10, Green    Quad sets  3 x 10 3" (5 degrees)    Supine heel slides  2 x 10 (116 degrees)    SLR 4 x 5 reps    TKEs  3 x 10 red TB    Hip adduction with bridge  2 x 10 (not today)          Ther-Act     Heel Raises 3 x 10    Mini Squats 3 x 10    3 way hip 15 x each    Stair knee flexion stretch 5 x 10"   Sit <> stands  2 x 10 No UE     Forward Step ups 2 x 10 high step    Lateral Step ups 2 x 10 high step    Standing marching  3 x 10                                  Patient Education and Home Exercises     Home Exercises Provided and Patient Education Provided "     Education provided:   - Plan of Care, Home exercise program, Delayed onset muscle soreness     Written Home Exercises Provided: Patient instructed to cont prior HEP. Exercises were reviewed and Cary was able to demonstrate them prior to the end of the session.  Cary demonstrated good  understanding of the education provided. See EMR under Patient Instructions for exercises provided during therapy sessions    ASSESSMENT   Cary is a 58 y.o. female referred to outpatient Physical Therapy with a medical diagnosis of R TKR. Patient presents with R knee pain, decreased R knee ROM, decreased R LE muscle strength and motor control. Patient's impairments are currently limiting her overall mobility and activity tolerance. Patient can benefit from skilled PT services to address listed impairments and promote return to PLOF and return to work. Patient demonstrates good carryover of exercise instruction and compliance with Home exercise program requiring only occasional cues for speed and reps with exercises. PT noted patient continues to lack end range R knee extension active range of motion but has full Passive range of motion at this time. Patient had no reports of pain only muscle fatigue with exercises.     Patient prognosis is Good.   Patient will benefit from skilled outpatient Physical Therapy to address the deficits stated above and in the chart below, provide patient /family education, and to maximize patientt's level of independence.      Plan of care discussed with patient: Yes  Patient's spiritual, cultural and educational needs considered and patient is agreeable to the plan of care and goals as stated below:      Anticipated Barriers for therapy: N/A    Goals:  Short Term Goals: 2 weeks   Patient will independently complete Home exercise program with correct form.   Patient will report decreased R knee pain to less than or equal to 3/10 for improved quality of life.   Patient will complete sit to stand  transfer with no UE use for increased independence with function transfers.      Long Term Goals: 4 weeks   Pt will increase R knee flexion to 120 degrees, knee extension to 0 degrees, and quad lag to 0 degrees to allow patient normal gait pattern.  Pt will increase R knee strength to 4/5 to allow patient to safely return to prior level of function   Pt will report decrease in R knee pain to 2/10 with standing for greater than 30 minutes for increased activity tolerance.   Pt will ambulate community distance with increased gait speed to greater than or equal to 3.25 feet per second for increased safety with community ambulation.   Pt will complete 5 x sit<> less than or equal to 15 seconds indicating increased power for functional transfers  Pt will negotiate up and down stairs with reciprocal pattern for increased independence with community ambulation.        PLAN   Plan of care Certification: 3/12/2024 to 5/29/2024.     Outpatient Physical Therapy 2 times weekly for 4 weeks to include the following interventions: Electrical Stimulation unattended, Gait Training, Manual Therapy, Moist Heat/ Ice, Neuromuscular Re-ed, Patient Education, Therapeutic Activities, and Therapeutic Exercise.     Plan to recertify patient per PT order to continue to progress toward physical therapy goals.   Caleb German, PT, DPT     5/8/2024

## 2024-05-15 ENCOUNTER — CLINICAL SUPPORT (OUTPATIENT)
Dept: REHABILITATION | Facility: HOSPITAL | Age: 59
End: 2024-05-15
Payer: COMMERCIAL

## 2024-05-15 DIAGNOSIS — Z96.651 S/P TOTAL KNEE ARTHROPLASTY, RIGHT: Primary | ICD-10-CM

## 2024-05-15 PROCEDURE — 97530 THERAPEUTIC ACTIVITIES: CPT | Mod: CQ

## 2024-05-15 PROCEDURE — 97110 THERAPEUTIC EXERCISES: CPT | Mod: CQ

## 2024-05-15 NOTE — PROGRESS NOTES
"[facility]   Physical Therapy Treatment Note     Name: Cary Carbone  Clinic Number: [id number]    Therapy Diagnosis: R knee pain   Physician: Trish Goodwin, AMANDA    Visit Date: 5/15/2024    Physician Orders: PT Eval and Treat   Medical Diagnosis from Referral: s/p TKR R   Evaluation Date: 3/12/2024  Authorization Period Expiration: 3/6/2025   Plan of Care Expiration: 5/29/2024  Progress Note Due: 5/29/2024  Date of Surgery: 2/21/2024  Visit # / Visits authorized: 23/28  FOTO: to be completed on visit 28     Precautions: Standard      Time In: 8:46  Time Out: 9:30  Total Billable Time: 44 minutes     PTA Visit #:  1/5  SUBJECTIVE     Pt reports: "This knee is still tight and swelling up on me".  Patient reports she is continuing to apply ice to Right knee with LE elevated greater than heart level.     She was compliant with home exercise program.  Response to previous treatment: N/A  Functional change: improved mobility with less pain.     Pain: 3/10  Location: right knee      OBJECTIVE     Objective Measures updated at progress report unless specified.     Treatment     Cary received the treatments listed below:      therapeutic exercises to develop strength, endurance, ROM, and flexibility.  See flow-sheet below.    Therapeutic activities to improve functional performance including: see flowsheet below      Ther- Ex Reps   BIke 8 minutes     Wedge 2 minute    Hamstring Stretch 2 x 20"    LAQ's 3x 10, 2#    Hamstring Curls 3 x 10, Green    Quad sets  3 x 10 3" (3 degrees)    Supine heel slides  2 x 10 (120 degrees)    SLR 4 x 5 reps    TKEs  3 x 10 red TB    Hip adduction with bridge  2 x 10 (not today)          Ther-Act     Heel Raises 3 x 10    Mini Squats 3 x 10    3 way hip 15 x each    Stair knee flexion stretch 5 x 10"   Sit <> stands  2 x 10 No UE     Forward Step ups 2 x 10 high step    Lateral Step ups 2 x 10 high step    Standing marching  3 x 10                           "        Patient Education and Home Exercises     Home Exercises Provided and Patient Education Provided     Education provided:   - Plan of Care, Home exercise program, Delayed onset muscle soreness     Written Home Exercises Provided: Patient instructed to cont prior HEP. Exercises were reviewed and Cary was able to demonstrate them prior to the end of the session.  Cary demonstrated good  understanding of the education provided. See EMR under Patient Instructions for exercises provided during therapy sessions    ASSESSMENT   Cary is a 58 y.o. female referred to outpatient Physical Therapy with a medical diagnosis of R TKR. Patient presents with R knee pain, decreased R knee ROM, decreased R LE muscle strength and motor control. Patient's impairments are currently limiting her overall mobility and activity tolerance. Patient can benefit from skilled PT services to address listed impairments and promote return to PLOF and return to work. Patient demonstrates good carryover of exercise instruction and compliance with Home exercise program requiring only occasional cues for speed and reps with exercises. Right knee active range of motion flexion goal met.  Right knee active range of motion extension goal improved this treatment but not met.  No adverse effects noted to physical therapy treatment session.         Patient prognosis is Good.   Patient will benefit from skilled outpatient Physical Therapy to address the deficits stated above and in the chart below, provide patient /family education, and to maximize patientt's level of independence.      Plan of care discussed with patient: Yes  Patient's spiritual, cultural and educational needs considered and patient is agreeable to the plan of care and goals as stated below:      Anticipated Barriers for therapy: N/A    Goals:  Short Term Goals: 2 weeks   Patient will independently complete Home exercise program with correct form.   Patient will report decreased R  knee pain to less than or equal to 3/10 for improved quality of life.   Patient will complete sit to stand transfer with no UE use for increased independence with function transfers.      Long Term Goals: 4 weeks   Pt will increase R knee flexion to 120 degrees, knee extension to 0 degrees, and quad lag to 0 degrees to allow patient normal gait pattern.  Pt will increase R knee strength to 4/5 to allow patient to safely return to prior level of function   Pt will report decrease in R knee pain to 2/10 with standing for greater than 30 minutes for increased activity tolerance.   Pt will ambulate community distance with increased gait speed to greater than or equal to 3.25 feet per second for increased safety with community ambulation.   Pt will complete 5 x sit<> less than or equal to 15 seconds indicating increased power for functional transfers  Pt will negotiate up and down stairs with reciprocal pattern for increased independence with community ambulation.        PLAN   Plan of care Certification: 3/12/2024 to 5/29/2024.     Outpatient Physical Therapy 2 times weekly for 4 weeks to include the following interventions: Electrical Stimulation unattended, Gait Training, Manual Therapy, Moist Heat/ Ice, Neuromuscular Re-ed, Patient Education, Therapeutic Activities, and Therapeutic Exercise.     Plan to recertify patient per PT order to continue to progress toward physical therapy goals.   VANNA Mercado     5/15/2024

## 2024-05-20 ENCOUNTER — CLINICAL SUPPORT (OUTPATIENT)
Dept: REHABILITATION | Facility: HOSPITAL | Age: 59
End: 2024-05-20
Payer: COMMERCIAL

## 2024-05-20 DIAGNOSIS — Z96.651 S/P TOTAL KNEE ARTHROPLASTY, RIGHT: Primary | ICD-10-CM

## 2024-05-20 PROCEDURE — 97530 THERAPEUTIC ACTIVITIES: CPT

## 2024-05-20 PROCEDURE — 97110 THERAPEUTIC EXERCISES: CPT

## 2024-05-20 NOTE — PROGRESS NOTES
"[facility]   Physical Therapy Treatment Note     Name: Cary Carbone  Clinic Number: [id number]    Therapy Diagnosis: R knee pain   Physician: Trish Goodwin FNP    Visit Date: 5/20/2024    Physician Orders: PT Eval and Treat   Medical Diagnosis from Referral: s/p TKR R   Evaluation Date: 3/12/2024  Authorization Period Expiration: 3/6/2025   Plan of Care Expiration: 5/29/2024  Progress Note Due: 5/29/2024  Date of Surgery: 2/21/2024  Visit # / Visits authorized: 24/28  FOTO: to be completed on visit 28     Precautions: Standard      Time In: 8:00  Time Out: 8:57  Total Billable Time:  57 minutes (49 minutes and 8 minutes not billed)     PTA Visit #:  0/5  SUBJECTIVE     Pt reports: "It is still tight and sensitive. I still can't kneel on it."     She was compliant with home exercise program.  Response to previous treatment: N/A  Functional change: improved mobility with less pain.     Pain: 4/10  Location: right knee      OBJECTIVE     Objective Measures updated at progress report unless specified.     Treatment     Cary received the treatments listed below:      therapeutic exercises to develop strength, endurance, ROM, and flexibility for 22 minutes.  See flow-sheet below.    Therapeutic activities to improve functional performance including for 27 minutes: see flowsheet below      Ther- Ex Reps   BIke 8 minutes     Wedge 2 minute    Hamstring Stretch 2 x 20"    LAQ's 2 x 10, 2.5#   Hamstring Curls 3 x 10, Green    Quad sets  3 x 10 3" (2 degrees)    Supine heel slides  2 x 10 (121 degrees)    SLR 2 x 10  reps    TKEs  3 x 10 red TB    Sidelying Hip ABD  2 x 10          Ther-Act     Heel Raises 3 x 10    Mini Squats 3 x 10    3 way hip 15 x each red TB *   Stair knee flexion stretch 5 x 10"   Sit <> stands  2 x 10 No UE  with red TB *   Forward Step ups 2 x 10 high step    Lateral Step ups 2 x 10 high step    Standing marching  3 x 10                                  Patient " Education and Home Exercises     Home Exercises Provided and Patient Education Provided     Education provided:   - Plan of Care, Home exercise program, Delayed onset muscle soreness     Written Home Exercises Provided: Patient instructed to cont prior HEP. Exercises were reviewed and Cary was able to demonstrate them prior to the end of the session.  Cary demonstrated good  understanding of the education provided. See EMR under Patient Instructions for exercises provided during therapy sessions    ASSESSMENT   Cary is a 58 y.o. female referred to outpatient Physical Therapy with a medical diagnosis of R TKR. Patient presents with R knee pain, decreased R knee ROM, decreased R LE muscle strength and motor control. Patient's impairments are currently limiting her overall mobility and activity tolerance. Patient can benefit from skilled PT services to address listed impairments and promote return to PLOF and return to work. Patient progressed through exercise tasks with occasional cueing for decreased compensations and increased WB on R LE during standing tasks. Patient tolerated increased resistance as described above with no reports of increased pain or adverse effects. Patient reports that she is scheduled to return to MD for follow-up tomorrow.     Patient prognosis is Good.   Patient will benefit from skilled outpatient Physical Therapy to address the deficits stated above and in the chart below, provide patient /family education, and to maximize patientt's level of independence.      Plan of care discussed with patient: Yes  Patient's spiritual, cultural and educational needs considered and patient is agreeable to the plan of care and goals as stated below:      Anticipated Barriers for therapy: N/A    Goals:  Short Term Goals: 2 weeks   Patient will independently complete Home exercise program with correct form.   Patient will report decreased R knee pain to less than or equal to 3/10 for improved quality  of life.   Patient will complete sit to stand transfer with no UE use for increased independence with function transfers.      Long Term Goals: 4 weeks   Pt will increase R knee flexion to 120 degrees, knee extension to 0 degrees, and quad lag to 0 degrees to allow patient normal gait pattern.  Pt will increase R knee strength to 4/5 to allow patient to safely return to prior level of function   Pt will report decrease in R knee pain to 2/10 with standing for greater than 30 minutes for increased activity tolerance.   Pt will ambulate community distance with increased gait speed to greater than or equal to 3.25 feet per second for increased safety with community ambulation.   Pt will complete 5 x sit<> less than or equal to 15 seconds indicating increased power for functional transfers  Pt will negotiate up and down stairs with reciprocal pattern for increased independence with community ambulation.        PLAN   Plan of care Certification: 3/12/2024 to 5/29/2024.     Outpatient Physical Therapy 2 times weekly for 4 weeks to include the following interventions: Electrical Stimulation unattended, Gait Training, Manual Therapy, Moist Heat/ Ice, Neuromuscular Re-ed, Patient Education, Therapeutic Activities, and Therapeutic Exercise.     Continue POC per PT orders to progress patient toward rehab goals.   Caleb German, PT, DPT       5/20/2024

## 2024-05-21 ENCOUNTER — OFFICE VISIT (OUTPATIENT)
Dept: ORTHOPEDICS | Facility: CLINIC | Age: 59
End: 2024-05-21
Payer: COMMERCIAL

## 2024-05-21 ENCOUNTER — HOSPITAL ENCOUNTER (OUTPATIENT)
Dept: RADIOLOGY | Facility: HOSPITAL | Age: 59
Discharge: HOME | End: 2024-05-21
Attending: ORTHOPAEDIC SURGERY
Payer: COMMERCIAL

## 2024-05-21 DIAGNOSIS — Z96.651 S/P TOTAL KNEE ARTHROPLASTY, RIGHT: Primary | ICD-10-CM

## 2024-05-21 DIAGNOSIS — Z96.651 S/P TOTAL KNEE ARTHROPLASTY, RIGHT: ICD-10-CM

## 2024-05-21 PROCEDURE — 73562 X-RAY EXAM OF KNEE 3: CPT | Mod: TC,RT

## 2024-05-21 PROCEDURE — 99212 OFFICE O/P EST SF 10 MIN: CPT | Mod: PBBFAC,25 | Performed by: ORTHOPAEDIC SURGERY

## 2024-05-21 PROCEDURE — 99024 POSTOP FOLLOW-UP VISIT: CPT | Mod: ,,, | Performed by: ORTHOPAEDIC SURGERY

## 2024-05-21 PROCEDURE — 73562 X-RAY EXAM OF KNEE 3: CPT | Mod: 26,RT,, | Performed by: ORTHOPAEDIC SURGERY

## 2024-05-21 RX ORDER — NAPROXEN 500 MG/1
500 TABLET ORAL 2 TIMES DAILY WITH MEALS
Qty: 60 TABLET | Refills: 3 | Status: SHIPPED | OUTPATIENT
Start: 2024-05-21

## 2024-05-21 NOTE — PROGRESS NOTES
CC:  Knee pain    58 y.o. Female returns to clinic for a follow up visit regarding knee pain.       Patient is 12 weeks post surgery. States she still have pain and swelling from time to time. She has not returned back to work.       Past Medical History:   Diagnosis Date    Elevated blood pressure reading without diagnosis of hypertension 2021    Essential hypertension 2021    Hypertension 2023     Past Surgical History:   Procedure Laterality Date     SECTION      COLONOSCOPY      over 10 years ago per patient @ [facility] 2021    [place]  2022    Results were negative per AMANDA Duran- recommended repeat in 3 yrs    ROBOTIC ARTHROPLASTY, KNEE Right 2024    Procedure: ROBOTIC ARTHROPLASTY, KNEE, TOTAL;  Surgeon: Leonid Hilton MD;  Location: [facility] OR;  Service: Orthopedics;  Laterality: Right;    TUBAL LIGATION           PHYSICAL EXAMINATION:  LMP  (LMP Unknown)   Ortho/[facility] Exam  2-120. Keloid along incision    IMAGING:  X-Ray Knee AP LAT with Sunrise Right    Result Date: 2024  See Procedure Notes for results. IMPRESSION: Please see Ortho procedure notes for report.  This procedure was auto-finalized by: Virtual Radiologist  three views of the right knee were obtained today demonstrating a stable appearing cementless total knee arthroplasty in excellent alignment.    ASSESSMENT:      ICD-10-CM ICD-9-CM   1. S/P total knee arthroplasty, right  Z96.651 V43.65       PLAN:     -Findings and treatment options were discussed with the patient  -All questions answered  Natural history and expected course discussed. Questions answered.  Educational materials distributed.  Rest, ice, compression, and elevation (RICE) therapy.  Refer to Dr. Eliud Hilton for treatement of keloid    As far as her work is concerned I think it is reasonable for her to return to work which she does need some modifications and restrictions.  She states she often times has to stand for 8  straight our she is unable to do this at this time.  I think that it is therefore recommended that she returns to work with frequent breaks lamina her to sit for 5 minutes every hour in order to allow her to complete a typical 8 hour type day.    There are no Patient Instructions on file for this visit.      No orders of the defined types were placed in this encounter.        Procedures

## 2024-05-23 ENCOUNTER — DOCUMENTATION ONLY (OUTPATIENT)
Dept: REHABILITATION | Facility: HOSPITAL | Age: 59
End: 2024-05-23
Payer: COMMERCIAL

## 2024-05-23 NOTE — PROGRESS NOTES
[facility]  PT Discharge Note    Name: Cary Carbone  Clinic Number: [id number]    Therapy Diagnosis: R knee pain   Physician: Trish Goodwin FNP    Physician Orders: PT Eval and Treat   Medical Diagnosis from Referral: s/p TKR R   Evaluation Date: 3/12/2024  Date of Last visit: 5/20/2024  Total Visits Received: 24    ASSESSMENT      Patient has been released by MD to return to work at this time. Patient is appropriate to discharge to home exercise program at this time.     Discharge reason: Patient has met all of her goals    Discharge FOTO Score: 54%    Goals:   Patient will independently complete Home exercise program with correct form. -MET   Patient will report decreased R knee pain to less than or equal to 3/10 for improved quality of life. -MET   Patient will complete sit to stand transfer with no UE use for increased independence with function transfers. -MET      Long Term Goals:  Pt will increase R knee flexion to 120 degrees, knee extension to 0 degrees, and quad lag to 0 degrees to allow patient normal gait pattern.-MET   Pt will increase R knee strength to 4/5 to allow patient to safely return to prior level of function -MET   Pt will report decrease in R knee pain to 2/10 with standing for greater than 30 minutes for increased activity tolerance. -MET   Pt will ambulate community distance with increased gait speed to greater than or equal to 3.25 feet per second for increased safety with community ambulation. -MET   Pt will complete 5 x sit<> less than or equal to 15 seconds indicating increased power for functional transfers-MET   Pt will negotiate up and down stairs with reciprocal pattern for increased independence with community ambulation. -MET     PLAN   This patient is discharged from Physical Therapy      Caleb German, PT, DPT

## 2024-07-02 ENCOUNTER — TELEPHONE (OUTPATIENT)
Dept: ORTHOPEDICS | Facility: CLINIC | Age: 59
End: 2024-07-02
Payer: COMMERCIAL

## 2024-07-02 NOTE — TELEPHONE ENCOUNTER
----- Message from Opal Sevilla sent at 7/2/2024 11:33 AM CDT -----  #PATIENT IS NEED TO GET A CARD FROM YOU YOU SAYING THAT I HAD KNEE REPLACEMENT SO THE METAL DETECTOR WANT GO OFF IN THE AIRPORT CALL BACK NUMBER [phone]

## 2024-08-12 DIAGNOSIS — Z96.651 S/P TOTAL KNEE ARTHROPLASTY, RIGHT: Primary | ICD-10-CM

## 2024-08-14 ENCOUNTER — OFFICE VISIT (OUTPATIENT)
Dept: PRIMARY CARE CLINIC | Facility: CLINIC | Age: 59
End: 2024-08-14
Payer: COMMERCIAL

## 2024-08-14 ENCOUNTER — PATIENT MESSAGE (OUTPATIENT)
Dept: PRIMARY CARE CLINIC | Facility: CLINIC | Age: 59
End: 2024-08-14
Payer: COMMERCIAL

## 2024-08-14 VITALS
WEIGHT: 203 LBS | HEIGHT: 66 IN | TEMPERATURE: 98 F | RESPIRATION RATE: 18 BRPM | OXYGEN SATURATION: 95 % | SYSTOLIC BLOOD PRESSURE: 136 MMHG | BODY MASS INDEX: 32.62 KG/M2 | DIASTOLIC BLOOD PRESSURE: 78 MMHG | HEART RATE: 69 BPM

## 2024-08-14 DIAGNOSIS — I10 ESSENTIAL HYPERTENSION: Primary | ICD-10-CM

## 2024-08-14 DIAGNOSIS — K21.00 GASTROESOPHAGEAL REFLUX DISEASE WITH ESOPHAGITIS WITHOUT HEMORRHAGE: ICD-10-CM

## 2024-08-14 PROCEDURE — 3075F SYST BP GE 130 - 139MM HG: CPT | Mod: ,,, | Performed by: NURSE PRACTITIONER

## 2024-08-14 PROCEDURE — 99214 OFFICE O/P EST MOD 30 MIN: CPT | Mod: ,,, | Performed by: NURSE PRACTITIONER

## 2024-08-14 PROCEDURE — 1159F MED LIST DOCD IN RCRD: CPT | Mod: ,,, | Performed by: NURSE PRACTITIONER

## 2024-08-14 PROCEDURE — 3078F DIAST BP <80 MM HG: CPT | Mod: ,,, | Performed by: NURSE PRACTITIONER

## 2024-08-14 PROCEDURE — 3008F BODY MASS INDEX DOCD: CPT | Mod: ,,, | Performed by: NURSE PRACTITIONER

## 2024-08-14 PROCEDURE — 1160F RVW MEDS BY RX/DR IN RCRD: CPT | Mod: ,,, | Performed by: NURSE PRACTITIONER

## 2024-08-14 RX ORDER — PANTOPRAZOLE SODIUM 40 MG/1
40 TABLET, DELAYED RELEASE ORAL DAILY
Qty: 90 TABLET | Refills: 3 | Status: SHIPPED | OUTPATIENT
Start: 2024-08-14 | End: 2025-08-14

## 2024-08-14 NOTE — PROGRESS NOTES
Subjective     Patient ID: Cary Carbone is a 58 y.o. female.    Chief Complaint: 6 month hypertension check-up    Pt presents for a 6 month follow-up for hypertension.       Review of Systems   Constitutional:  Negative for activity change, appetite change, chills, fatigue and fever.   HENT:  Negative for nasal congestion, ear discharge, nosebleeds, postnasal drip, rhinorrhea, sinus pressure/congestion, sneezing, sore throat and tinnitus.    Eyes:  Negative for pain, discharge, redness and itching.   Respiratory:  Negative for cough, choking, chest tightness, shortness of breath and wheezing.    Cardiovascular:  Negative for chest pain.   Gastrointestinal:  Negative for abdominal distention, abdominal pain, blood in stool, change in bowel habit, constipation, diarrhea, nausea and vomiting.   Genitourinary:  Negative for decreased urine volume, dysuria, flank pain and frequency.   Musculoskeletal:  Negative for back pain and gait problem.   Integumentary:  Negative for wound, breast mass and breast discharge.   Allergic/Immunologic: Negative for immunocompromised state.   Neurological:  Negative for dizziness, light-headedness and headaches.   Psychiatric/Behavioral:  Negative for agitation, behavioral problems and hallucinations.    Breast: Negative for mass         Objective     Physical Exam  Vitals and nursing note reviewed.   Constitutional:       Appearance: Normal appearance.   Cardiovascular:      Rate and Rhythm: Normal rate and regular rhythm.      Heart sounds: Normal heart sounds.   Pulmonary:      Effort: Pulmonary effort is normal.      Breath sounds: Normal breath sounds.   Musculoskeletal:         General: Normal range of motion.   Neurological:      Mental Status: She is alert and oriented to person, place, and time.   Psychiatric:         Mood and Affect: Mood normal.         Behavior: Behavior normal.            Assessment and Plan     1. Essential hypertension  -     CBC Auto Differential;  Future; Expected date: 08/14/2024  -     Comprehensive Metabolic Panel; Future; Expected date: 08/14/2024  -     Lipid Panel; Future; Expected date: 08/14/2024  -     TSH; Future; Expected date: 08/14/2024  Controlled at 136/78    2. Gastroesophageal reflux disease with esophagitis without hemorrhage  -     pantoprazole (PROTONIX) 40 MG tablet; Take 1 tablet (40 mg total) by mouth once daily.  Dispense: 90 tablet; Refill: 3  Avoid spicy foods      Will call pt with lab results.          Follow up in about 6 months (around 2/14/2025).

## 2024-08-15 ENCOUNTER — OFFICE VISIT (OUTPATIENT)
Dept: ORTHOPEDICS | Facility: CLINIC | Age: 59
End: 2024-08-15
Payer: COMMERCIAL

## 2024-08-15 ENCOUNTER — HOSPITAL ENCOUNTER (OUTPATIENT)
Dept: RADIOLOGY | Facility: HOSPITAL | Age: 59
Discharge: HOME | End: 2024-08-15
Attending: ORTHOPAEDIC SURGERY
Payer: COMMERCIAL

## 2024-08-15 DIAGNOSIS — Z96.651 S/P TOTAL KNEE ARTHROPLASTY, RIGHT: ICD-10-CM

## 2024-08-15 DIAGNOSIS — Z96.651 S/P TOTAL KNEE ARTHROPLASTY, RIGHT: Primary | ICD-10-CM

## 2024-08-15 PROCEDURE — 99212 OFFICE O/P EST SF 10 MIN: CPT | Mod: PBBFAC,25 | Performed by: ORTHOPAEDIC SURGERY

## 2024-08-15 PROCEDURE — 99999 PR PBB SHADOW E&M-EST. PATIENT-LVL II: CPT | Mod: PBBFAC,,, | Performed by: ORTHOPAEDIC SURGERY

## 2024-08-15 PROCEDURE — 99213 OFFICE O/P EST LOW 20 MIN: CPT | Mod: S$PBB,,, | Performed by: ORTHOPAEDIC SURGERY

## 2024-08-15 PROCEDURE — 73562 X-RAY EXAM OF KNEE 3: CPT | Mod: TC,RT

## 2024-08-15 PROCEDURE — 73562 X-RAY EXAM OF KNEE 3: CPT | Mod: 26,RT,, | Performed by: ORTHOPAEDIC SURGERY

## 2024-08-15 NOTE — PROGRESS NOTES
CC:  Knee pain    58 y.o. Female returns to clinic for a follow up visit regarding knee pain.       Patient is 6 months post surgery and doing good.       Past Medical History:   Diagnosis Date    Elevated blood pressure reading without diagnosis of hypertension 2021    Essential hypertension 2021    Hypertension 2023     Past Surgical History:   Procedure Laterality Date     SECTION      COLONOSCOPY      over 10 years ago per patient @ [facility] 2021    [place]  2022    Results were negative per AMANDA Duran- recommended repeat in 3 yrs    ROBOTIC ARTHROPLASTY, KNEE Right 2024    Procedure: ROBOTIC ARTHROPLASTY, KNEE, TOTAL;  Surgeon: Leonid Hilton MD;  Location: [facility]THO OR;  Service: Orthopedics;  Laterality: Right;    TUBAL LIGATION           PHYSICAL EXAMINATION:  LMP  (LMP Unknown)   Ortho/[facility] Exam  1-120  Mild pain over lateral joint line  Excellent stability    IMAGING:  X-Ray Knee AP LAT with Sunrise Right    Result Date: 8/15/2024  See Procedure Notes for results. IMPRESSION: Please see Ortho procedure notes for report.  This procedure was auto-finalized by: Virtual Radiologist     3 Radiographs of the right knee were obtained today.  The prosthesis appears to be in excellent overall alignment.  No evidence of loosening or fracture is demonstrated.  Components appear to be appropriately positioned with no adverse interval change.     ASSESSMENT:      ICD-10-CM ICD-9-CM   1. S/P total knee arthroplasty, right  Z96.651 V43.65       PLAN:     -Findings and treatment options were discussed with the patient  -All questions answered  Natural history and expected course discussed. Questions answered.  Educational materials distributed.  Rest, ice, compression, and elevation (RICE) therapy.  Doing great.  See back in 6 months    There are no Patient Instructions on file for this visit.      No orders of the defined types were placed in this  encounter.        Procedures

## 2024-08-30 ENCOUNTER — OFFICE VISIT (OUTPATIENT)
Dept: DERMATOLOGY | Facility: CLINIC | Age: 59
End: 2024-08-30
Payer: COMMERCIAL

## 2024-08-30 DIAGNOSIS — L91.0 KELOID: Primary | ICD-10-CM

## 2024-08-30 DIAGNOSIS — Z96.651 S/P TOTAL KNEE ARTHROPLASTY, RIGHT: ICD-10-CM

## 2024-09-06 ENCOUNTER — TELEPHONE (OUTPATIENT)
Dept: ORTHOPEDICS | Facility: CLINIC | Age: 59
End: 2024-09-06
Payer: COMMERCIAL

## 2024-09-06 NOTE — TELEPHONE ENCOUNTER
----- Message from [facility] sent at 9/6/2024  2:12 PM CDT -----  Regarding: Rx Antibiotics  Who Called: Cary Carbone    Caller is requesting assistance/information from provider's office.      Preferred Method of Contact: Phone Call  Patient's Preferred Phone Number on File: [phone]   Best Call Back Number, if different:  Additional Information: Pt would like a call back because she went to the dentist and stated she was told to get a rx of antibiotics.

## 2025-01-27 DIAGNOSIS — I10 ESSENTIAL HYPERTENSION: ICD-10-CM

## 2025-01-27 RX ORDER — HYDROCHLOROTHIAZIDE 12.5 MG/1
12.5 TABLET ORAL
Qty: 90 TABLET | Refills: 0 | Status: SHIPPED | OUTPATIENT
Start: 2025-01-27

## 2025-02-14 ENCOUNTER — OFFICE VISIT (OUTPATIENT)
Dept: FAMILY MEDICINE | Facility: CLINIC | Age: 60
End: 2025-02-14
Payer: COMMERCIAL

## 2025-02-14 ENCOUNTER — PATIENT MESSAGE (OUTPATIENT)
Dept: FAMILY MEDICINE | Facility: CLINIC | Age: 60
End: 2025-02-14
Payer: COMMERCIAL

## 2025-02-14 VITALS
OXYGEN SATURATION: 99 % | SYSTOLIC BLOOD PRESSURE: 130 MMHG | WEIGHT: 203 LBS | HEIGHT: 66 IN | DIASTOLIC BLOOD PRESSURE: 87 MMHG | BODY MASS INDEX: 32.62 KG/M2 | HEART RATE: 80 BPM | TEMPERATURE: 99 F

## 2025-02-14 DIAGNOSIS — Z13.220 SCREENING FOR LIPOID DISORDERS: ICD-10-CM

## 2025-02-14 DIAGNOSIS — I10 ESSENTIAL HYPERTENSION: ICD-10-CM

## 2025-02-14 DIAGNOSIS — Z12.11 SCREENING FOR MALIGNANT NEOPLASM OF COLON: ICD-10-CM

## 2025-02-14 DIAGNOSIS — Z13.1 SCREENING FOR DIABETES MELLITUS: ICD-10-CM

## 2025-02-14 DIAGNOSIS — Z00.00 ROUTINE GENERAL MEDICAL EXAMINATION AT A HEALTH CARE FACILITY: Primary | ICD-10-CM

## 2025-02-14 DIAGNOSIS — K21.9 GASTROESOPHAGEAL REFLUX DISEASE WITHOUT ESOPHAGITIS: ICD-10-CM

## 2025-02-14 LAB
ALBUMIN SERPL BCP-MCNC: 3.8 G/DL (ref 3.5–5)
ALBUMIN/GLOB SERPL: 1.3 {RATIO}
ALP SERPL-CCNC: 110 U/L (ref 40–150)
ALT SERPL W P-5'-P-CCNC: 14 U/L
ANION GAP SERPL CALCULATED.3IONS-SCNC: 13 MMOL/L (ref 7–16)
AST SERPL W P-5'-P-CCNC: 15 U/L (ref 5–34)
BASOPHILS # BLD AUTO: 0.04 K/UL (ref 0–0.2)
BASOPHILS NFR BLD AUTO: 0.6 % (ref 0–1)
BILIRUB SERPL-MCNC: 0.8 MG/DL
BUN SERPL-MCNC: 9 MG/DL (ref 10–20)
BUN/CREAT SERPL: 11 (ref 6–20)
CALCIUM SERPL-MCNC: 9.1 MG/DL (ref 8.4–10.2)
CHLORIDE SERPL-SCNC: 105 MMOL/L (ref 98–107)
CHOLEST SERPL-MCNC: 184 MG/DL
CHOLEST/HDLC SERPL: 3.2 {RATIO}
CO2 SERPL-SCNC: 29 MMOL/L (ref 22–29)
CREAT SERPL-MCNC: 0.8 MG/DL (ref 0.55–1.02)
DIFFERENTIAL METHOD BLD: ABNORMAL
EGFR (NO RACE VARIABLE) (RUSH/TITUS): 86 ML/MIN/1.73M2
EOSINOPHIL # BLD AUTO: 0.16 K/UL (ref 0–0.5)
EOSINOPHIL NFR BLD AUTO: 2.4 % (ref 1–4)
ERYTHROCYTE [DISTWIDTH] IN BLOOD BY AUTOMATED COUNT: 11.6 % (ref 11.5–14.5)
GLOBULIN SER-MCNC: 2.9 G/DL (ref 2–4)
GLUCOSE SERPL-MCNC: 97 MG/DL (ref 74–100)
HCT VFR BLD AUTO: 41.2 % (ref 38–47)
HDLC SERPL-MCNC: 57 MG/DL (ref 35–60)
HGB BLD-MCNC: 12.6 G/DL (ref 12–16)
IMM GRANULOCYTES # BLD AUTO: 0.03 K/UL (ref 0–0.04)
IMM GRANULOCYTES NFR BLD: 0.4 % (ref 0–0.4)
LDLC SERPL CALC-MCNC: 115 MG/DL
LDLC/HDLC SERPL: 2 {RATIO}
LYMPHOCYTES # BLD AUTO: 2.27 K/UL (ref 1–4.8)
LYMPHOCYTES NFR BLD AUTO: 34 % (ref 27–41)
MCH RBC QN AUTO: 28.7 PG (ref 27–31)
MCHC RBC AUTO-ENTMCNC: 30.6 G/DL (ref 32–36)
MCV RBC AUTO: 93.8 FL (ref 80–96)
MONOCYTES # BLD AUTO: 0.56 K/UL (ref 0–0.8)
MONOCYTES NFR BLD AUTO: 8.4 % (ref 2–6)
MPC BLD CALC-MCNC: 11.3 FL (ref 9.4–12.4)
NEUTROPHILS # BLD AUTO: 3.61 K/UL (ref 1.8–7.7)
NEUTROPHILS NFR BLD AUTO: 54.2 % (ref 53–65)
NONHDLC SERPL-MCNC: 127 MG/DL
NRBC # BLD AUTO: 0 X10E3/UL
NRBC, AUTO (.00): 0 %
PLATELET # BLD AUTO: 269 K/UL (ref 150–400)
POTASSIUM SERPL-SCNC: 4.1 MMOL/L (ref 3.5–5.1)
PROT SERPL-MCNC: 6.7 G/DL (ref 6.4–8.3)
RBC # BLD AUTO: 4.39 M/UL (ref 4.2–5.4)
SODIUM SERPL-SCNC: 143 MMOL/L (ref 136–145)
TRIGL SERPL-MCNC: 58 MG/DL (ref 37–140)
TSH SERPL DL<=0.005 MIU/L-ACNC: 0.98 UIU/ML (ref 0.35–4.94)
VLDLC SERPL-MCNC: 12 MG/DL
WBC # BLD AUTO: 6.67 K/UL (ref 4.5–11)

## 2025-02-14 PROCEDURE — 80050 GENERAL HEALTH PANEL: CPT | Mod: ,,, | Performed by: CLINICAL MEDICAL LABORATORY

## 2025-02-14 PROCEDURE — 80061 LIPID PANEL: CPT | Mod: ,,, | Performed by: CLINICAL MEDICAL LABORATORY

## 2025-02-14 NOTE — PROGRESS NOTES
Subjective     Patient ID: Cary Carbone is a 59 y.o. female.    Chief Complaint: Healthy You    Pt presents for a healthy you visit.       Review of Systems   Constitutional:  Negative for activity change, appetite change, chills, fatigue and fever.   HENT:  Negative for nasal congestion, ear discharge, nosebleeds, postnasal drip, rhinorrhea, sinus pressure/congestion, sneezing, sore throat and tinnitus.    Eyes:  Negative for pain, discharge, redness and itching.   Respiratory:  Negative for cough, choking, chest tightness, shortness of breath and wheezing.    Cardiovascular:  Negative for chest pain.   Gastrointestinal:  Negative for abdominal distention, abdominal pain, blood in stool, change in bowel habit, constipation, diarrhea, nausea and vomiting.   Genitourinary:  Negative for decreased urine volume, dysuria, flank pain, frequency, menstrual irregularity and menstrual problem.   Musculoskeletal:  Negative for back pain and gait problem.   Integumentary:  Negative for wound, breast mass and breast discharge.   Allergic/Immunologic: Negative for immunocompromised state.   Neurological:  Negative for dizziness, light-headedness and headaches.   Psychiatric/Behavioral:  Negative for agitation, behavioral problems and hallucinations.    Breast: Negative for mass         Objective     Physical Exam  Vitals and nursing note reviewed.   Constitutional:       Appearance: Normal appearance.   Cardiovascular:      Rate and Rhythm: Normal rate and regular rhythm.      Heart sounds: Normal heart sounds.   Pulmonary:      Effort: Pulmonary effort is normal.      Breath sounds: Normal breath sounds.   Musculoskeletal:         General: Normal range of motion.   Neurological:      Mental Status: She is alert and oriented to person, place, and time.   Psychiatric:         Mood and Affect: Mood normal.         Behavior: Behavior normal.            Assessment and Plan     1. Routine general medical examination at a [facility]  care facility    2. Screening for diabetes mellitus    3. Screening for lipoid disorders  -     Lipid Panel; Future; Expected date: 02/14/2025    4. Gastroesophageal reflux disease without esophagitis    5. Essential hypertension  -     CBC Auto Differential; Future; Expected date: 02/14/2025  -     Comprehensive Metabolic Panel; Future; Expected date: 02/14/2025  -     TSH; Future; Expected date: 02/14/2025    6. BMI 32.0-32.9,adult    7. Screening for malignant neoplasm of colon  -     Cologuard Screening (Multitarget Stool DNA); Future; Expected date: 02/14/2025        Will call pt with lab results.          Follow up in about 1 year (around 2/14/2026).

## 2025-02-14 NOTE — PATIENT INSTRUCTIONS
"Patient Education       Diet and Health   The Basics   Written by the doctors and editors at [facility]   Why is it important to eat a healthy diet? -- It's important to eat a healthy diet because eating the right foods can keep you healthy now and later on in life.  Which foods are especially healthy? -- Foods that are especially healthy include:  Fruits and vegetables - Eating a diet with lots of fruits and vegetables can help prevent heart disease and strokes. It might also help prevent certain types of cancers. Try to eat fruits and vegetables at each meal and also for snacks. If you don't have fresh fruits and vegetables available, you can eat frozen or canned ones instead. Doctors recommend eating at least 2 1/2 servings of vegetables and 2 servings of fruits each day.  Foods with fiber - Eating foods with a lot of fiber can help prevent heart disease and strokes. If you have type 2 diabetes, it can also help control your blood sugar. Foods that have a lot of fiber include vegetables, fruits, beans, nuts, oatmeal, and whole grain breads and cereals. You can tell how much fiber is in a food by reading the nutrition label (figure 1). Doctors recommend eating 25 to 36 grams of fiber each day.  Foods with folate (also called folic acid) - Folate is a vitamin that is important for pregnant people, since it helps prevent certain birth defects. Anyone who could get pregnant should get at least 400 micrograms of folic acid daily, whether or not they are actively trying to get pregnant. Folate is found in many breakfast cereals, oranges, orange juice, and green leafy vegetables.  Foods with calcium and vitamin D - Babies, children, and adults need calcium and vitamin D to help keep their bones strong. Adults also need calcium and vitamin D to help prevent osteoporosis. Osteoporosis is a condition that causes bones to get "thin" and break more easily than usual. Different foods and drinks have calcium and vitamin D in " "them (figure 2). People who don't get enough calcium and vitamin D in their diet might need to take a supplement.  Foods with protein - Protein helps your muscles stay strong. Healthy foods with a lot of protein include chicken, fish, eggs, beans, nuts, and soy products. Red meat also has a lot of protein, but it also contains fats, which can be unhealthy.  Some experts recommend a "Mediterranean diet." This involves eating a lot of fruits, vegetables, nuts, whole grains, and olive oil. It also includes some fish, poultry, and dairy products, but not a lot of red meat. Eating this way can help your overall health, and might even lower your risk of having a stroke.  What foods should I avoid or limit? -- To eat a healthy diet, there are some things you should avoid or limit. They include:   Fats - There are different types of fats. Some types of fats are better for your body than others.  Trans fats are especially unhealthy. They are found in margarines, many fast foods, and some store-bought baked goods. Trans fats can raise your cholesterol level and your increase your chance of getting heart disease. You should avoid eating foods with these types of fats.  The type of "polyunsaturated" fats found in fish seems to be healthy and can reduce your chance of getting heart disease. Other polyunsaturated fats might also be good for your health. When you cook, it's best to use oils with healthier fats, such as olive oil and canola oil.  Sugar - To have a healthy diet, it's important to limit or avoid sugar, sweets, and refined grains. Refined grains are found in white bread, white rice, most forms of pasta, and most packaged "snack" foods. Whole grains, such as whole-wheat bread and brown rice, have more fiber and are better for your health.  Avoiding sugar-sweetened beverages, like soda and sports drinks, can also help improve your health.  Red meat - Studies have shown that eating a lot of red meat can increase your " "risk of certain health problems, including heart disease and cancer. You should limit the amount of red meat that you eat.  Can I drink alcohol as part of a healthy diet? -- People who drink a small amount of alcohol each day might have a lower chance of getting heart disease. But drinking alcohol can lead to problems. For example, it can raise a person's chances of getting liver disease and certain types of cancers. In women, even 1 drink a day can increase the risk of getting breast cancer.  Most doctors recommend that adult women not have more than 1 drink a day and that adult men not have more than 2 drinks a day. The limits are different because most women's bodies take longer to break down alcohol.  How many calories do I need each day? -- The number of calories you need each day depends on your weight, height, age, sex, and how active you are.  Your doctor or nurse can tell you how many calories you should eat each day. If you are trying to lose weight, you should eat fewer calories each day.  What if I have questions? -- If you have questions about which foods you should or should not eat, ask your doctor or nurse. The right diet for you will depend, in part, on your health and any medical conditions you have.  All topics are updated as new evidence becomes available and our peer review process is complete.  This topic retrieved from [employer] on: Sep 21, 2021.  Topic 89513 Version 20.0  Release: 29.4.2 - C29.263  © 2021 UpToDate, Inc. and/or its affiliates. All rights reserved.  figure 1: Nutrition label - fiber     This is an example of a nutrition label. To figure out how much fiber is in a food, look for the line that says "Dietary Fiber." It's also important to look at the serving size. This food has 7 grams of fiber in each serving, and each serving is 1 cup.  Graphic 20844 Version 7.0    figure 2: Foods and drinks with calcium and vitamin D     Foods rich in calcium include ice cream, soy milk, breads, " "kale, broccoli, milk, cheese, cottage cheese, almonds, yogurt, ready-to-eat cereals, beans, and tofu. Foods rich in vitamin D include milk, fortified plant-based "milks" (soy, almond), canned tuna fish, cod liver oil, yogurt, ready-to-eat-cereals, cooked salmon, canned sardines, mackerel, and eggs. Some of these foods are rich in both.  Graphic 09113 Version 4.0    Consumer Information Use and Disclaimer   This information is not specific medical advice and does not replace information you receive from your health care provider. This is only a brief summary of general information. It does NOT include all information about conditions, illnesses, injuries, tests, procedures, treatments, therapies, discharge instructions or life-style choices that may apply to you. You must talk with your health care provider for complete information about your health and treatment options. This information should not be used to decide whether or not to accept your health care provider's advice, instructions or recommendations. Only your health care provider has the knowledge and training to provide advice that is right for you. The use of this information is governed by the [employer] End User License Agreement, available at https://www.[employer]/en/solutions/[employer]/about/amaury.The use of [employer] content is governed by the [employer] Terms of Use. ©2021 UpToDate, Inc. All rights reserved.  Copyright   © 2021 UpToDate, Inc. and/or its affiliates. All rights reserved.    "

## 2025-02-17 ENCOUNTER — PATIENT OUTREACH (OUTPATIENT)
Facility: HOSPITAL | Age: 60
End: 2025-02-17
Payer: COMMERCIAL

## 2025-02-17 NOTE — PROGRESS NOTES
Population Health Chart Review & Patient Outreach Details  Post visit Population Health review of encounter with date of service   with [facility].  All required HY components in encounter.        Further Action Needed If Patient Returns Outreach:            Updates Requested / Reviewed:     []  Care Everywhere    []     []  External Sources ([facility], [facility], [facility], etc.)    [] [facility]   [] [facility]   [] Other:    []  Care Team Updated   []  Removed  or Duplicate Orders   []  Immunization Reconciliation Completed / Queried    [] [place]   [] [place]   [] [place]   [] [place]      Health Maintenance Topics Addressed and Outreach Outcomes / Actions Taken:             Breast Cancer Screening []  Mammogram Order Placed    []  Mammogram Screening Scheduled    []  External Records Requested & Care Team Updated if Applicable    []  External Records Uploaded & Care Team Updated if Applicable    []  Pt Declined Scheduling Mammogram    []  Pt Will Schedule with External Provider / Order Routed & Care Team Updated if Applicable              Cervical Cancer Screening []  Pap Smear Scheduled in Primary Care or OBGYN    []  External Records Requested & Care Team Updated if Applicable       []  External Records Uploaded, Care Team Updated, & History Updated if Applicable    []  Patient Declined Scheduling Pap Smear    []  Patient Will Schedule with External Provider & Care Team Updated if Applicable                  Colorectal Cancer Screening []  Colonoscopy Case Request / Referral / Home Test Order Placed    []  External Records Requested & Care Team Updated if Applicable    []  External Records Uploaded, Care Team Updated, & History Updated if Applicable    []  Patient Declined Completing Colon Cancer Screening    []  Patient Will Schedule with External Provider & Care Team Updated if Applicable    []  Fit Kit Mailed (add the SmartPhrase under additional notes)    []  Reminded Patient to Complete Home Test                 Diabetic Eye Exam []  Eye Exam Screening Order Placed    []  Eye Camera Scheduled or Optometry/Ophthalmology Referral Placed    []  External Records Requested & Care Team Updated if Applicable    []  External Records Uploaded, Care Team Updated, & History Updated if Applicable    []  Patient Declined Scheduling Eye Exam    []  Patient Will Schedule with External Provider & Care Team Updated if Applicable             Blood Pressure Control []  Primary Care Follow Up Visit Scheduled     []  Remote Blood Pressure Reading Captured    []  Patient Declined Remote Reading or Scheduling Appt - Escalated to PCP    []  Patient Will Call Back or Send Portal Message with Reading                 HbA1c & Other Labs []  Overdue Lab(s) Ordered    []  Overdue Lab(s) Scheduled    []  External Records Uploaded & Care Team Updated if Applicable    []  Primary Care Follow Up Visit Scheduled     []  Reminded Patient to Complete A1c Home Test    []  Patient Declined Scheduling Labs or Will Call Back to Schedule    []  Patient Will Schedule with External Provider / Order Routed, & Care Team Updated if Applicable           Primary Care Appointment []  Primary Care Appt Scheduled    []  Patient Declined Scheduling or Will Call Back to Schedule    []  Pt Established with External Provider, Updated Care Team, & Informed Pt to Notify Payor if Applicable           Medication Adherence /    Statin Use []  Primary Care Appointment Scheduled    []  Patient Reminded to  Prescription    []  Patient Declined, Provider Notified if Needed    []  Sent Provider Message to Review to Evaluate Pt for Statin, Add Exclusion Dx Codes, Document   Exclusion in Problem List, Change Statin Intensity Level to Moderate or High Intensity if Applicable                Osteoporosis Screening []  Dexa Order Placed    []  Dexa Appointment Scheduled    []  External Records Requested & Care Team Updated    []  External Records Uploaded, Care Team Updated,  & History Updated if Applicable    []  Patient Declined Scheduling Dexa or Will Call Back to Schedule    []  Patient Will Schedule with External Provider / Order Routed & Care Team Updated if Applicable       Additional Notes:

## 2025-03-03 ENCOUNTER — PATIENT MESSAGE (OUTPATIENT)
Dept: FAMILY MEDICINE | Facility: CLINIC | Age: 60
End: 2025-03-03
Payer: COMMERCIAL

## 2025-04-07 ENCOUNTER — OFFICE VISIT (OUTPATIENT)
Dept: PRIMARY CARE CLINIC | Facility: CLINIC | Age: 60
End: 2025-04-07
Payer: COMMERCIAL

## 2025-04-07 VITALS
DIASTOLIC BLOOD PRESSURE: 78 MMHG | SYSTOLIC BLOOD PRESSURE: 115 MMHG | OXYGEN SATURATION: 100 % | HEIGHT: 66 IN | TEMPERATURE: 97 F | WEIGHT: 204.63 LBS | BODY MASS INDEX: 32.89 KG/M2 | HEART RATE: 76 BPM | RESPIRATION RATE: 20 BRPM

## 2025-04-07 DIAGNOSIS — M25.461 KNEE EFFUSION, RIGHT: ICD-10-CM

## 2025-04-07 DIAGNOSIS — M25.561 RIGHT KNEE PAIN, UNSPECIFIED CHRONICITY: Primary | ICD-10-CM

## 2025-04-07 PROCEDURE — 1160F RVW MEDS BY RX/DR IN RCRD: CPT | Mod: CPTII,,, | Performed by: FAMILY MEDICINE

## 2025-04-07 PROCEDURE — 99213 OFFICE O/P EST LOW 20 MIN: CPT | Mod: ,,, | Performed by: FAMILY MEDICINE

## 2025-04-07 PROCEDURE — 3008F BODY MASS INDEX DOCD: CPT | Mod: CPTII,,, | Performed by: FAMILY MEDICINE

## 2025-04-07 PROCEDURE — 3078F DIAST BP <80 MM HG: CPT | Mod: CPTII,,, | Performed by: FAMILY MEDICINE

## 2025-04-07 PROCEDURE — 3074F SYST BP LT 130 MM HG: CPT | Mod: CPTII,,, | Performed by: FAMILY MEDICINE

## 2025-04-07 PROCEDURE — 1159F MED LIST DOCD IN RCRD: CPT | Mod: CPTII,,, | Performed by: FAMILY MEDICINE

## 2025-04-07 RX ORDER — NAPROXEN 500 MG/1
500 TABLET ORAL 2 TIMES DAILY WITH MEALS
Qty: 60 TABLET | Refills: 5 | Status: SHIPPED | OUTPATIENT
Start: 2025-04-07

## 2025-04-07 NOTE — PROGRESS NOTES
Subjective     Patient ID: Cary Carbone is a 59 y.o. female.    Chief Complaint: Knee Pain (C/O right knee pain with swelling and nerve going down to foot, had knee surgery on 02/21/24 and has still been hurting badly)    Swelling is worse while pt. Is working.    Knee Pain       Review of Systems   Constitutional:  Negative for fatigue and fever.   HENT:  Negative for nasal congestion and dental problem.    Eyes:  Negative for discharge.   Respiratory:  Negative for cough and shortness of breath.    Cardiovascular:  Negative for chest pain.   Gastrointestinal:  Negative for constipation, diarrhea, nausea and vomiting.   Genitourinary:  Negative for bladder incontinence, difficulty urinating and hot flashes.   Musculoskeletal:  Positive for arthralgias, joint swelling and leg pain.   Allergic/Immunologic: Negative for environmental allergies.   Neurological:  Negative for headaches.   Psychiatric/Behavioral:  Negative for behavioral problems and confusion.           Objective     Physical Exam  Vitals and nursing note reviewed. Exam conducted with a chaperone present.   Constitutional:       Appearance: Normal appearance. She is normal weight.   HENT:      Head: Normocephalic and atraumatic.      Right Ear: Tympanic membrane normal.      Left Ear: Tympanic membrane normal.      Nose: Nose normal.      Mouth/Throat:      Mouth: Mucous membranes are moist.   Eyes:      Extraocular Movements: Extraocular movements intact.      Conjunctiva/sclera: Conjunctivae normal.      Pupils: Pupils are equal, round, and reactive to light.   Cardiovascular:      Rate and Rhythm: Normal rate and regular rhythm.      Pulses: Normal pulses.   Pulmonary:      Effort: Pulmonary effort is normal.      Breath sounds: Normal breath sounds.   Abdominal:      General: Abdomen is flat. Bowel sounds are normal.      Palpations: Abdomen is soft.   Musculoskeletal:         General: Normal range of motion.      Cervical back: Normal range of  motion and neck supple.      Comments: Right knee effusion   Skin:     General: Skin is warm and dry.   Neurological:      General: No focal deficit present.      Mental Status: She is alert and oriented to person, place, and time.   Psychiatric:         Mood and Affect: Mood normal.            Assessment and Plan     1. Right knee pain, unspecified chronicity  -     X-Ray Knee 1 or 2 View Right; Future; Expected date: 04/07/2025    2. Knee effusion, right  -     naproxen (NAPROSYN) 500 MG tablet; Take 1 tablet (500 mg total) by mouth 2 (two) times daily with meals.  Dispense: 60 tablet; Refill: 5        To see Dr. LOBO Hilton  RTC as needed         No follow-ups on file.

## 2025-04-07 NOTE — LETTER
April 7, 2025      [facility] [place] [facility]  [address] [place] [address]  Phone: [phone]  Fax: [phone]       Patient: Cary Carbone   YOB: 1965  Date of Visit: 04/07/2025    To Whom It May Concern:    Dona Carbone  was at [facility] on 04/07/2025. The patient may return to work/school on 04/148/2025 with no restrictions. If you have any questions or concerns, or if I can be of further assistance, please do not hesitate to contact me.    Sincerely,    Alicia Goodwin LPN

## 2025-04-27 DIAGNOSIS — I10 ESSENTIAL HYPERTENSION: ICD-10-CM

## 2025-04-27 RX ORDER — HYDROCHLOROTHIAZIDE 12.5 MG/1
12.5 TABLET ORAL
Qty: 90 TABLET | Refills: 1 | Status: SHIPPED | OUTPATIENT
Start: 2025-04-27

## 2025-05-01 ENCOUNTER — HOSPITAL ENCOUNTER (OUTPATIENT)
Dept: RADIOLOGY | Facility: HOSPITAL | Age: 60
Discharge: HOME | End: 2025-05-01
Attending: NURSE PRACTITIONER
Payer: COMMERCIAL

## 2025-05-01 VITALS — WEIGHT: 210 LBS | HEIGHT: 66 IN | BODY MASS INDEX: 33.75 KG/M2

## 2025-05-01 DIAGNOSIS — Z12.31 VISIT FOR SCREENING MAMMOGRAM: ICD-10-CM

## 2025-05-01 PROCEDURE — 77063 BREAST TOMOSYNTHESIS BI: CPT | Mod: TC

## 2025-06-07 DIAGNOSIS — K21.00 GASTROESOPHAGEAL REFLUX DISEASE WITH ESOPHAGITIS WITHOUT HEMORRHAGE: ICD-10-CM

## 2025-06-17 RX ORDER — PANTOPRAZOLE SODIUM 40 MG/1
40 TABLET, DELAYED RELEASE ORAL
Qty: 90 TABLET | Refills: 3 | Status: SHIPPED | OUTPATIENT
Start: 2025-06-17

## 2025-08-18 ENCOUNTER — OFFICE VISIT (OUTPATIENT)
Dept: FAMILY MEDICINE | Facility: CLINIC | Age: 60
End: 2025-08-18
Payer: COMMERCIAL

## 2025-08-18 VITALS
WEIGHT: 205.19 LBS | OXYGEN SATURATION: 97 % | DIASTOLIC BLOOD PRESSURE: 80 MMHG | BODY MASS INDEX: 32.98 KG/M2 | RESPIRATION RATE: 18 BRPM | HEART RATE: 72 BPM | SYSTOLIC BLOOD PRESSURE: 120 MMHG | TEMPERATURE: 99 F | HEIGHT: 66 IN

## 2025-08-18 DIAGNOSIS — L72.9 CUTANEOUS CYST: ICD-10-CM

## 2025-08-18 DIAGNOSIS — K21.00 GASTROESOPHAGEAL REFLUX DISEASE WITH ESOPHAGITIS WITHOUT HEMORRHAGE: ICD-10-CM

## 2025-08-18 DIAGNOSIS — I10 ESSENTIAL HYPERTENSION: Primary | ICD-10-CM

## 2025-08-18 LAB
ALBUMIN SERPL BCP-MCNC: 3.6 G/DL (ref 3.5–5)
ALBUMIN/GLOB SERPL: 0.9 {RATIO}
ALP SERPL-CCNC: 112 U/L (ref 40–150)
ALT SERPL W P-5'-P-CCNC: 14 U/L
ANION GAP SERPL CALCULATED.3IONS-SCNC: 11 MMOL/L (ref 7–16)
AST SERPL W P-5'-P-CCNC: 18 U/L (ref 11–45)
BASOPHILS # BLD AUTO: 0.04 K/UL (ref 0–0.2)
BASOPHILS NFR BLD AUTO: 0.6 % (ref 0–1)
BILIRUB SERPL-MCNC: 0.7 MG/DL
BUN SERPL-MCNC: 12 MG/DL (ref 10–20)
BUN/CREAT SERPL: 13 (ref 6–20)
CALCIUM SERPL-MCNC: 9.1 MG/DL (ref 8.4–10.2)
CHLORIDE SERPL-SCNC: 105 MMOL/L (ref 98–107)
CHOLEST SERPL-MCNC: 194 MG/DL
CHOLEST/HDLC SERPL: 3.5 {RATIO}
CO2 SERPL-SCNC: 29 MMOL/L (ref 22–29)
CREAT SERPL-MCNC: 0.91 MG/DL (ref 0.55–1.02)
DIFFERENTIAL METHOD BLD: ABNORMAL
EGFR (NO RACE VARIABLE) (RUSH/TITUS): 74 ML/MIN/1.73M2
EOSINOPHIL # BLD AUTO: 0.14 K/UL (ref 0–0.5)
EOSINOPHIL NFR BLD AUTO: 2.1 % (ref 1–4)
ERYTHROCYTE [DISTWIDTH] IN BLOOD BY AUTOMATED COUNT: 12.5 % (ref 11.5–14.5)
GLOBULIN SER-MCNC: 4 G/DL (ref 2–4)
GLUCOSE SERPL-MCNC: 95 MG/DL (ref 74–100)
HCT VFR BLD AUTO: 42.2 % (ref 38–47)
HDLC SERPL-MCNC: 55 MG/DL (ref 35–60)
HGB BLD-MCNC: 12.9 G/DL (ref 12–16)
IMM GRANULOCYTES # BLD AUTO: 0.04 K/UL (ref 0–0.04)
IMM GRANULOCYTES NFR BLD: 0.6 % (ref 0–0.4)
LDLC SERPL CALC-MCNC: 120 MG/DL
LDLC/HDLC SERPL: 2.2 {RATIO}
LYMPHOCYTES # BLD AUTO: 2.09 K/UL (ref 1–4.8)
LYMPHOCYTES NFR BLD AUTO: 31.2 % (ref 27–41)
MCH RBC QN AUTO: 28.7 PG (ref 27–31)
MCHC RBC AUTO-ENTMCNC: 30.6 G/DL (ref 32–36)
MCV RBC AUTO: 94 FL (ref 80–96)
MONOCYTES # BLD AUTO: 0.46 K/UL (ref 0–0.8)
MONOCYTES NFR BLD AUTO: 6.9 % (ref 2–6)
MPC BLD CALC-MCNC: 10.7 FL (ref 9.4–12.4)
NEUTROPHILS # BLD AUTO: 3.93 K/UL (ref 1.8–7.7)
NEUTROPHILS NFR BLD AUTO: 58.6 % (ref 53–65)
NONHDLC SERPL-MCNC: 139 MG/DL
NRBC # BLD AUTO: 0 X10E3/UL
NRBC, AUTO (.00): 0 %
PLATELET # BLD AUTO: 284 K/UL (ref 150–400)
POTASSIUM SERPL-SCNC: 4.2 MMOL/L (ref 3.5–5.1)
PROT SERPL-MCNC: 7.6 G/DL (ref 6.4–8.3)
RBC # BLD AUTO: 4.49 M/UL (ref 4.2–5.4)
SODIUM SERPL-SCNC: 141 MMOL/L (ref 136–145)
TRIGL SERPL-MCNC: 94 MG/DL (ref 37–140)
TSH SERPL DL<=0.005 MIU/L-ACNC: 0.88 UIU/ML (ref 0.35–4.94)
VLDLC SERPL-MCNC: 19 MG/DL
WBC # BLD AUTO: 6.7 K/UL (ref 4.5–11)

## 2025-08-18 PROCEDURE — 80050 GENERAL HEALTH PANEL: CPT | Mod: ,,, | Performed by: CLINICAL MEDICAL LABORATORY

## 2025-08-18 PROCEDURE — 99214 OFFICE O/P EST MOD 30 MIN: CPT | Mod: ,,, | Performed by: NURSE PRACTITIONER

## 2025-08-18 PROCEDURE — 3074F SYST BP LT 130 MM HG: CPT | Mod: ,,, | Performed by: NURSE PRACTITIONER

## 2025-08-18 PROCEDURE — 80061 LIPID PANEL: CPT | Mod: ,,, | Performed by: CLINICAL MEDICAL LABORATORY

## 2025-08-18 PROCEDURE — 3079F DIAST BP 80-89 MM HG: CPT | Mod: ,,, | Performed by: NURSE PRACTITIONER

## 2025-08-18 PROCEDURE — 1160F RVW MEDS BY RX/DR IN RCRD: CPT | Mod: ,,, | Performed by: NURSE PRACTITIONER

## 2025-08-18 PROCEDURE — 3008F BODY MASS INDEX DOCD: CPT | Mod: ,,, | Performed by: NURSE PRACTITIONER

## 2025-08-18 PROCEDURE — 1159F MED LIST DOCD IN RCRD: CPT | Mod: ,,, | Performed by: NURSE PRACTITIONER

## 2025-08-18 RX ORDER — HYDROCHLOROTHIAZIDE 12.5 MG/1
12.5 TABLET ORAL DAILY
Qty: 90 TABLET | Refills: 3 | Status: SHIPPED | OUTPATIENT
Start: 2025-08-18

## 2025-08-19 ENCOUNTER — PATIENT MESSAGE (OUTPATIENT)
Dept: FAMILY MEDICINE | Facility: CLINIC | Age: 60
End: 2025-08-19
Payer: COMMERCIAL

## (undated) DEVICE — GLOVE BIOGEL SKINSENSE PI 7.0

## (undated) DEVICE — ATTUNE PINNING SYSTEM
Type: IMPLANTABLE DEVICE | Site: KNEE | Status: NON-FUNCTIONAL
Brand: ATTUNE
Removed: 2024-02-21

## (undated) DEVICE — STAPLER SKIN WIDE

## (undated) DEVICE — TOWER MIX CEMENT BONE SMARTMIX

## (undated) DEVICE — APPLICATOR CHLORAPREP ORN 26ML

## (undated) DEVICE — SOL NACL IRR 3000ML

## (undated) DEVICE — BLADE RECIP DOUBLE SIDED

## (undated) DEVICE — KIT TOTAL KNEE RUSH

## (undated) DEVICE — SET CYSTO IRR DRP CHMBR 84IN

## (undated) DEVICE — GLOVE 7.0 PROTEXIS PI BLUE

## (undated) DEVICE — SUT VICRYL CTD 1 27IN CP

## (undated) DEVICE — KIT IRR SUCTION HND PIECE

## (undated) DEVICE — DRAPE INCISE IOBAN 2 23X23IN

## (undated) DEVICE — BLADE PERFORMANCE SAG 21X90MM

## (undated) DEVICE — SUT 2-0 VICRYL / CT-1

## (undated) DEVICE — PAD ABDOMINAL 8X7.5 STERILE

## (undated) DEVICE — NDL QUINCKE SPINAL 18G 3.5IN

## (undated) DEVICE — TOURNIQUET SB QC SP 34X4IN

## (undated) DEVICE — SPONGE COTTON TRAY 4X4IN

## (undated) DEVICE — SPACESUIT TOGA T5 ZIPPER PEEL

## (undated) DEVICE — VELYS SATELLITE STATION STERILE DRAPE

## (undated) DEVICE — VELYS OSCILLATING SAW BLADE

## (undated) DEVICE — PAD CAST SPECIALIST STRL 3

## (undated) DEVICE — SYR 30CC LUER LOCK

## (undated) DEVICE — SOL NACL IRR 1000ML BTL

## (undated) DEVICE — DRESSING GAUZE XEROFORM 5X9

## (undated) DEVICE — VELYS ARRAY SET

## (undated) DEVICE — VELYS DEVICE STERILE DRAPE